# Patient Record
Sex: MALE | Race: WHITE | NOT HISPANIC OR LATINO | Employment: FULL TIME | URBAN - METROPOLITAN AREA
[De-identification: names, ages, dates, MRNs, and addresses within clinical notes are randomized per-mention and may not be internally consistent; named-entity substitution may affect disease eponyms.]

---

## 2017-01-25 ENCOUNTER — OFFICE VISIT (OUTPATIENT)
Dept: INTERNAL MEDICINE | Facility: CLINIC | Age: 62
End: 2017-01-25
Payer: COMMERCIAL

## 2017-01-25 VITALS
TEMPERATURE: 87 F | WEIGHT: 175.06 LBS | OXYGEN SATURATION: 98 % | DIASTOLIC BLOOD PRESSURE: 60 MMHG | HEIGHT: 68 IN | BODY MASS INDEX: 26.53 KG/M2 | HEART RATE: 99 BPM | SYSTOLIC BLOOD PRESSURE: 110 MMHG

## 2017-01-25 DIAGNOSIS — B18.2 CHRONIC HEPATITIS C WITHOUT HEPATIC COMA: ICD-10-CM

## 2017-01-25 DIAGNOSIS — I10 HYPERTENSION, ESSENTIAL: ICD-10-CM

## 2017-01-25 DIAGNOSIS — L57.0 AK (ACTINIC KERATOSIS): ICD-10-CM

## 2017-01-25 DIAGNOSIS — Z00.00 ANNUAL PHYSICAL EXAM: Primary | ICD-10-CM

## 2017-01-25 DIAGNOSIS — Z12.5 PROSTATE CANCER SCREENING: ICD-10-CM

## 2017-01-25 PROCEDURE — 99396 PREV VISIT EST AGE 40-64: CPT | Mod: S$GLB,,, | Performed by: FAMILY MEDICINE

## 2017-01-25 PROCEDURE — 3078F DIAST BP <80 MM HG: CPT | Mod: S$GLB,,, | Performed by: FAMILY MEDICINE

## 2017-01-25 PROCEDURE — 99999 PR PBB SHADOW E&M-EST. PATIENT-LVL III: CPT | Mod: PBBFAC,,, | Performed by: FAMILY MEDICINE

## 2017-01-25 PROCEDURE — 3074F SYST BP LT 130 MM HG: CPT | Mod: S$GLB,,, | Performed by: FAMILY MEDICINE

## 2017-01-25 RX ORDER — LOSARTAN POTASSIUM 100 MG/1
100 TABLET ORAL DAILY
Qty: 180 TABLET | Refills: 1 | Status: SHIPPED | OUTPATIENT
Start: 2017-01-25 | End: 2018-03-14 | Stop reason: SDUPTHER

## 2017-01-25 NOTE — MR AVS SNAPSHOT
[facility]  [address]  [place] [address]  Phone: [phone]  Fax: [phone]                  Jose Sahni   2017 2:00 PM   Office Visit    Description:  Male : 1955   Provider:  Doug Cordero MD   Department:  [facility]           Reason for Visit     Follow-up           Diagnoses this Visit        Comments    Annual physical exam    -  Primary     Hypertension, essential         Chronic hepatitis C without hepatic coma         Prostate cancer screening         AK (actinic keratosis)                To Do List           Future Appointments        Provider Department Dept Phone    2017 10:00 AM LAB, APPOINTMENT [facility] [phone]    2017 11:30 AM Mckenna Simental MD [facility] - Dermatology [phone]      Goals (5 Years of Data)     None      Follow-Up and Disposition     Return in about 1 year (around 2018) for annual physical exam, Keep appointments with specialty providers..    Follow-up and Disposition History       These Medications        Disp Refills Start End    losartan (COZAAR) 100 MG tablet 180 tablet 1 2017     Take 1 tablet (100 mg total) by mouth once daily. - Oral    Pharmacy: [facility] #[id number] - [place], [place] - [address]  #: [phone]         [facility] On Call     [facility] Care Line -  Assistance  Registered nurses in the [facility] Center provide clinical advisement, health education, appointment booking, and other advisory services.  Call for this free service at [phone].             Medications           Message regarding Medications     Verify the changes and/or additions to your medication regime listed below are the same as discussed with your clinician today.  If any of these changes or additions are incorrect, please notify your healthcare provider.             Verify that the below list of medications is an  "accurate representation of the medications you are currently taking.  If none reported, the list may be blank. If incorrect, please contact your healthcare provider. Carry this list with you in case of emergency.           Current Medications     folic acid (FOLVITE) 1 MG tablet Take 1 mg by mouth once daily.    losartan (COZAAR) 100 MG tablet Take 1 tablet (100 mg total) by mouth once daily.    sildenafil (VIAGRA) 100 MG tablet Take 1 tablet (100 mg total) by mouth daily as needed for Erectile Dysfunction.           Clinical Reference Information           Vital Signs - Last Recorded  Most recent update: 1/25/2017  1:56 PM by Trevor Thurman MA    BP Pulse Temp Ht Wt SpO2    110/60 (BP Location: Left arm, Patient Position: Sitting, BP Method: Manual) 99 (!) 87 °F (30.6 °C) (Oral) 5' 8" (1.727 m) 79.4 kg (175 lb 0.7 oz) 98%    BMI                26.62 kg/m2          Blood Pressure          Most Recent Value    BP  110/60      Allergies as of 1/25/2017     Benazepril Hcl      Immunizations Administered on Date of Encounter - 1/25/2017     None      Orders Placed During Today's Visit      Normal Orders This Visit    Ambulatory referral to Dermatology     Future Labs/Procedures Expected by Expires    Lipid panel  1/25/2017 1/25/2018    PSA, Screening  1/25/2017 1/25/2018      [facility] Sign-Up     Activating your [facility] account is as easy as 1-2-3!     1) Visit [email or web address], select Sign Up Now, enter this activation code and your date of birth, then select Next.  [email or web address]  Expires: 3/11/2017  2:52 PM      2) Create a username and password to use when you visit [facility] in the future and select a security question in case you lose your password and select Next.    3) Enter your e-mail address and click Sign Up!    Additional Information  If you have questions, please e-mail [email or web address] or call [phone] to talk to our [facility] staff. Remember, [facility] is NOT to be used for urgent needs. " For medical emergencies, dial 911.

## 2017-01-25 NOTE — PROGRESS NOTES
Subjective:       Patient ID: Jose Sahni is a 61 y.o. male.    Chief Complaint: Follow-up    HPI Comments: Established patient for an annual wellness check/physical exam. No specific complaints, please see ROS.      Review of Systems   Constitutional: Negative for chills, fatigue and fever.   HENT: Negative for congestion and trouble swallowing.    Eyes: Negative for redness.   Respiratory: Negative for cough, chest tightness and shortness of breath.    Cardiovascular: Negative for chest pain, palpitations and leg swelling.   Gastrointestinal: Negative for abdominal pain and blood in stool.        Anal itching   Genitourinary: Negative for hematuria.   Musculoskeletal: Negative for arthralgias, back pain, gait problem, joint swelling, myalgias and neck pain.   Skin: Negative for color change and rash.   Neurological: Negative for tremors, speech difficulty, weakness, numbness and headaches.   Hematological: Negative for adenopathy. Does not bruise/bleed easily.   Psychiatric/Behavioral: Negative for behavioral problems, confusion and sleep disturbance. The patient is not nervous/anxious.        Objective:      Physical Exam   Constitutional: He appears well-developed and well-nourished.   HENT:   Head: Normocephalic.   Right Ear: Tympanic membrane, external ear and ear canal normal.   Left Ear: Tympanic membrane, external ear and ear canal normal.   Mouth/Throat: Oropharynx is clear and moist.   Eyes: Pupils are equal, round, and reactive to light.   Neck: Normal range of motion. Neck supple. Carotid bruit is not present. No thyromegaly present.   Cardiovascular: Normal rate, regular rhythm, normal heart sounds and intact distal pulses.  Exam reveals no gallop and no friction rub.    No murmur heard.  Pulmonary/Chest: Effort normal and breath sounds normal.   Abdominal: Soft. Bowel sounds are normal. He exhibits no distension and no mass. There is no tenderness. There is no rebound and no guarding.    Musculoskeletal: Normal range of motion. He exhibits no edema or tenderness.   Lymphadenopathy:     He has no cervical adenopathy.   Neurological: He is alert. He has normal strength. He displays normal reflexes. No cranial nerve deficit or sensory deficit. Coordination and gait normal.   Skin: Skin is warm and dry.   Psychiatric: He has a normal mood and affect. His behavior is normal. Judgment and thought content normal.   Nursing note and vitals reviewed.      Assessment:       1. Annual physical exam    2. Hypertension, essential    3. Chronic hepatitis C without hepatic coma    4. Prostate cancer screening    5. AK (actinic keratosis)        Plan:   Jose was seen today for follow-up.    Diagnoses and all orders for this visit:    Annual physical exam  -     Lipid panel; Future    Hypertension, essential    Chronic hepatitis C without hepatic coma    Prostate cancer screening  -     PSA, Screening; Future    AK (actinic keratosis)  -     Ambulatory referral to Dermatology    Other orders  -     losartan (COZAAR) 100 MG tablet; Take 1 tablet (100 mg total) by mouth once daily.      See meds, orders, follow up, routing and instructions sections of encounter.  Annual physical examination.  His last colonoscopy was reviewed, next due  2021.    He is out of his blood pressure medications.  He states compliance.  He has a   history of hepatitis C, which is pending full treatment.  He is followed in our   Hepatology Service.  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    AKs noted, Dermatology consult.  Follow up with Hepatology Clinic.      TAYLOR/HN  dd: 01/25/2017 15:46:12 (CST)  td: 01/26/2017 11:30:38 (CST)  Doc ID   #[id number]  Job ID #[id number]    CC:

## 2017-01-26 ENCOUNTER — LAB VISIT (OUTPATIENT)
Dept: LAB | Facility: HOSPITAL | Age: 62
End: 2017-01-26
Attending: FAMILY MEDICINE
Payer: COMMERCIAL

## 2017-01-26 DIAGNOSIS — Z12.5 PROSTATE CANCER SCREENING: ICD-10-CM

## 2017-01-26 DIAGNOSIS — Z00.00 ANNUAL PHYSICAL EXAM: ICD-10-CM

## 2017-01-26 LAB
CHOLEST/HDLC SERPL: 4 {RATIO}
COMPLEXED PSA SERPL-MCNC: 0.98 NG/ML
HDL/CHOLESTEROL RATIO: 24.8 %
HDLC SERPL-MCNC: 121 MG/DL
HDLC SERPL-MCNC: 30 MG/DL
LDLC SERPL CALC-MCNC: 68.8 MG/DL
NONHDLC SERPL-MCNC: 91 MG/DL
TRIGL SERPL-MCNC: 111 MG/DL

## 2017-01-26 PROCEDURE — 36415 COLL VENOUS BLD VENIPUNCTURE: CPT

## 2017-01-26 PROCEDURE — 84153 ASSAY OF PSA TOTAL: CPT

## 2017-01-26 PROCEDURE — 80061 LIPID PANEL: CPT

## 2017-01-31 ENCOUNTER — INITIAL CONSULT (OUTPATIENT)
Dept: DERMATOLOGY | Facility: CLINIC | Age: 62
End: 2017-01-31
Payer: COMMERCIAL

## 2017-01-31 DIAGNOSIS — D48.5 NEOPLASM OF UNCERTAIN BEHAVIOR OF SKIN: ICD-10-CM

## 2017-01-31 DIAGNOSIS — L57.0 ACTINIC KERATOSIS: Primary | ICD-10-CM

## 2017-01-31 DIAGNOSIS — D22.9 MULTIPLE BENIGN NEVI: ICD-10-CM

## 2017-01-31 DIAGNOSIS — L81.4 LENTIGINES: ICD-10-CM

## 2017-01-31 PROCEDURE — 99202 OFFICE O/P NEW SF 15 MIN: CPT | Mod: S$GLB,,, | Performed by: DERMATOLOGY

## 2017-01-31 PROCEDURE — 1159F MED LIST DOCD IN RCRD: CPT | Mod: S$GLB,,, | Performed by: DERMATOLOGY

## 2017-01-31 PROCEDURE — 99999 PR PBB SHADOW E&M-EST. PATIENT-LVL II: CPT | Mod: PBBFAC,,, | Performed by: DERMATOLOGY

## 2017-01-31 NOTE — LETTER
January 31, 2017      Doug Cordero MD  [address]  [place] [address]           [facility] - Dermatology  [address]  [place] [address]  Phone: [phone]  Fax: [phone]          Patient: Jose Sahni   MR Number: [id number]   YOB: 1955   Date of Visit: 1/31/2017       Dear Dr. Doug Cordero:    Thank you for referring Jose Sahni to me for evaluation. Attached you will find relevant portions of my assessment and plan of care.    If you have questions, please do not hesitate to call me. I look forward to following Jose Sahni along with you.    Sincerely,    Mckenna Simental MD    Enclosure  CC:  No Recipients    If you would like to receive this communication electronically, please contact [email or web address] or [phone] to request more information on [employer] Link access.    For providers and/or their staff who would like to refer a patient to [facility], please contact us through our one-stop-shop provider referral line, [facility] [place], at [phone].    If you feel you have received this communication in error or would no longer like to receive these types of communications, please e-mail [email or web address]

## 2017-01-31 NOTE — PROGRESS NOTES
Subjective:       Patient ID:  Jose Sahni is a 61 y.o. male who presents for   Chief Complaint   Patient presents with    Spots and/or Floaters     forearms, x yrs, asym, no tx     HPI  62 yo M presents for evaluation of rough spots on his arms for several years.  Denies bleeding.  No prior treatments.  [place] at [place] time); usually wears long sleeves and a hat.  There is also a spot on nose x 7 months, stable in size; denies bleeding; noticed after falling asleep with glasses on    Derm Hx: Denies personal or family h/o skin cancer    Past Medical History   Diagnosis Date    Allergy     Asymptomatic cholelithiasis     Heart murmur     Hepatitis      gentoype 2a or 2c    Hypertension     Tuberculosis      Hx postive     Review of Systems   Constitutional: Negative for fever, chills, fatigue and malaise.   Skin: Negative for daily sunscreen use, activity-related sunscreen use and recent sunburn.   Hematologic/Lymphatic: Bruises/bleeds easily.        Objective:    Physical Exam   Constitutional: He appears well-developed and well-nourished. No distress.   Neurological: He is alert and oriented to person, place, and time. He is not disoriented.   Psychiatric: He has a normal mood and affect.   Skin:   Areas Examined (abnormalities noted in diagram):   Scalp / Hair Palpated and Inspected  Head / Face Inspection Performed  Neck Inspection Performed  Back Inspection Performed  RUE Inspected  LUE Inspection Performed                   Diagram Legend     Erythematous scaling macule/papule c/w actinic keratosis       Vascular papule c/w angioma      Pigmented verrucoid papule/plaque c/w seborrheic keratosis      Yellow umbilicated papule c/w sebaceous hyperplasia      Irregularly shaped tan macule c/w lentigo     1-2 mm smooth white papules consistent with Milia      Movable subcutaneous cyst with punctum c/w epidermal inclusion cyst      Subcutaneous  movable cyst c/w pilar cyst      Firm pink to brown papule c/w dermatofibroma      Pedunculated fleshy papule(s) c/w skin tag(s)      Evenly pigmented macule c/w junctional nevus     Mildly variegated pigmented, slightly irregular-bordered macule c/w mildly atypical nevus      Flesh colored to evenly pigmented papule c/w intradermal nevus       Pink pearly papule/plaque c/w basal cell carcinoma      Erythematous hyperkeratotic cursted plaque c/w SCC      Surgical scar with no sign of skin cancer recurrence      Open and closed comedones      Inflammatory papules and pustules      Verrucoid papule consistent consistent with wart     Erythematous eczematous patches and plaques     Dystrophic onycholytic nail with subungual debris c/w onychomycosis     Umbilicated papule    Erythematous-base heme-crusted tan verrucoid plaque consistent with inflamed seborrheic keratosis     Erythematous Silvery Scaling Plaque c/w Psoriasis     See annotation      Assessment / Plan:        Actinic keratosis  Pt leaving x 4 months.  Wife will call to schedule follow up after they discuss (Efudex vs PDT)    Lentigines  This is a benign hyperpigmented sun induced lesion. Daily sun protection will reduce the number of new lesions    Multiple benign Nevi  Reassurance that his nevi appear benign with regular and consistent pigment pattern on dermoscopy    Neoplasm of Uncertain Behavior  Likely angioma but will continue to monitor. Measured today         Return in about 4 months (around 5/31/2017).

## 2017-07-20 ENCOUNTER — CLINICAL SUPPORT (OUTPATIENT)
Dept: URGENT CARE | Facility: CLINIC | Age: 62
End: 2017-07-20

## 2017-07-20 VITALS
RESPIRATION RATE: 14 BRPM | DIASTOLIC BLOOD PRESSURE: 68 MMHG | SYSTOLIC BLOOD PRESSURE: 136 MMHG | HEART RATE: 82 BPM | OXYGEN SATURATION: 98 % | HEIGHT: 67 IN | BODY MASS INDEX: 27 KG/M2 | TEMPERATURE: 98 F | WEIGHT: 172 LBS

## 2017-07-20 DIAGNOSIS — Z00.00 PHYSICAL EXAM: Primary | ICD-10-CM

## 2017-07-20 PROCEDURE — 99499 UNLISTED E&M SERVICE: CPT | Mod: S$GLB,,, | Performed by: PREVENTIVE MEDICINE

## 2017-07-20 PROCEDURE — 99000 SPECIMEN HANDLING OFFICE-LAB: CPT | Mod: S$GLB,,, | Performed by: PREVENTIVE MEDICINE

## 2017-07-20 PROCEDURE — 36415 COLL VENOUS BLD VENIPUNCTURE: CPT | Mod: S$GLB,,, | Performed by: PREVENTIVE MEDICINE

## 2017-07-20 PROCEDURE — 93000 ELECTROCARDIOGRAM COMPLETE: CPT | Mod: S$GLB,,, | Performed by: PREVENTIVE MEDICINE

## 2017-08-10 ENCOUNTER — TELEPHONE (OUTPATIENT)
Dept: HEPATOLOGY | Facility: CLINIC | Age: 62
End: 2017-08-10

## 2017-08-10 DIAGNOSIS — B18.2 CHRONIC HEPATITIS C WITHOUT HEPATIC COMA: Primary | ICD-10-CM

## 2017-08-10 NOTE — TELEPHONE ENCOUNTER
----- Message from Angel Larios sent at 8/10/2017 11:01 AM CDT -----  Contact: Spouse  Would like to scheduled an appt,     Call [phone]

## 2017-08-25 ENCOUNTER — EPISODE CHANGES (OUTPATIENT)
Dept: HEPATOLOGY | Facility: CLINIC | Age: 62
End: 2017-08-25

## 2017-08-25 ENCOUNTER — OFFICE VISIT (OUTPATIENT)
Dept: HEPATOLOGY | Facility: CLINIC | Age: 62
End: 2017-08-25
Payer: COMMERCIAL

## 2017-08-25 ENCOUNTER — LAB VISIT (OUTPATIENT)
Dept: LAB | Facility: HOSPITAL | Age: 62
End: 2017-08-25
Attending: INTERNAL MEDICINE
Payer: COMMERCIAL

## 2017-08-25 ENCOUNTER — PROCEDURE VISIT (OUTPATIENT)
Dept: HEPATOLOGY | Facility: CLINIC | Age: 62
End: 2017-08-25
Attending: INTERNAL MEDICINE
Payer: COMMERCIAL

## 2017-08-25 VITALS
OXYGEN SATURATION: 100 % | DIASTOLIC BLOOD PRESSURE: 68 MMHG | BODY MASS INDEX: 25.76 KG/M2 | HEIGHT: 68 IN | TEMPERATURE: 97 F | WEIGHT: 170 LBS | HEART RATE: 66 BPM | RESPIRATION RATE: 18 BRPM | SYSTOLIC BLOOD PRESSURE: 166 MMHG

## 2017-08-25 DIAGNOSIS — B18.2 CHRONIC HEPATITIS C WITHOUT HEPATIC COMA: ICD-10-CM

## 2017-08-25 DIAGNOSIS — Z71.89 ENCOUNTER FOR MEDICATION COUNSELING: ICD-10-CM

## 2017-08-25 DIAGNOSIS — B18.2 CHRONIC HEPATITIS C WITHOUT HEPATIC COMA: Primary | ICD-10-CM

## 2017-08-25 LAB
ALBUMIN SERPL BCP-MCNC: 4.1 G/DL
ALP SERPL-CCNC: 64 U/L
ALT SERPL W/O P-5'-P-CCNC: 30 U/L
ANION GAP SERPL CALC-SCNC: 5 MMOL/L
AST SERPL-CCNC: 23 U/L
BASOPHILS # BLD AUTO: 0.02 K/UL
BASOPHILS NFR BLD: 0.3 %
BILIRUB SERPL-MCNC: 1.7 MG/DL
BUN SERPL-MCNC: 18 MG/DL
CALCIUM SERPL-MCNC: 9.2 MG/DL
CHLORIDE SERPL-SCNC: 109 MMOL/L
CO2 SERPL-SCNC: 25 MMOL/L
CREAT SERPL-MCNC: 1 MG/DL
DIFFERENTIAL METHOD: ABNORMAL
EOSINOPHIL # BLD AUTO: 0.1 K/UL
EOSINOPHIL NFR BLD: 1.5 %
ERYTHROCYTE [DISTWIDTH] IN BLOOD BY AUTOMATED COUNT: 14.7 %
EST. GFR  (AFRICAN AMERICAN): >60 ML/MIN/1.73 M^2
EST. GFR  (NON AFRICAN AMERICAN): >60 ML/MIN/1.73 M^2
GLUCOSE SERPL-MCNC: 108 MG/DL
HCT VFR BLD AUTO: 34.1 %
HGB BLD-MCNC: 12 G/DL
INR PPP: 1.1
LYMPHOCYTES # BLD AUTO: 1.3 K/UL
LYMPHOCYTES NFR BLD: 21.6 %
MCH RBC QN AUTO: 30.9 PG
MCHC RBC AUTO-ENTMCNC: 35.2 G/DL
MCV RBC AUTO: 88 FL
MONOCYTES # BLD AUTO: 0.5 K/UL
MONOCYTES NFR BLD: 7.9 %
NEUTROPHILS # BLD AUTO: 4 K/UL
NEUTROPHILS NFR BLD: 68 %
PLATELET # BLD AUTO: 211 K/UL
PMV BLD AUTO: 9 FL
POTASSIUM SERPL-SCNC: 4.2 MMOL/L
PROT SERPL-MCNC: 8.1 G/DL
PROTHROMBIN TIME: 11.5 SEC
RBC # BLD AUTO: 3.88 M/UL
SODIUM SERPL-SCNC: 139 MMOL/L
WBC # BLD AUTO: 5.84 K/UL

## 2017-08-25 PROCEDURE — 85610 PROTHROMBIN TIME: CPT

## 2017-08-25 PROCEDURE — 3077F SYST BP >= 140 MM HG: CPT | Mod: S$GLB,,, | Performed by: PHYSICIAN ASSISTANT

## 2017-08-25 PROCEDURE — 36415 COLL VENOUS BLD VENIPUNCTURE: CPT

## 2017-08-25 PROCEDURE — 99999 PR PBB SHADOW E&M-EST. PATIENT-LVL IV: CPT | Mod: PBBFAC,,, | Performed by: PHYSICIAN ASSISTANT

## 2017-08-25 PROCEDURE — 87522 HEPATITIS C REVRS TRNSCRPJ: CPT

## 2017-08-25 PROCEDURE — 3078F DIAST BP <80 MM HG: CPT | Mod: S$GLB,,, | Performed by: PHYSICIAN ASSISTANT

## 2017-08-25 PROCEDURE — 99214 OFFICE O/P EST MOD 30 MIN: CPT | Mod: S$GLB,,, | Performed by: PHYSICIAN ASSISTANT

## 2017-08-25 PROCEDURE — 91200 LIVER ELASTOGRAPHY: CPT | Mod: S$GLB,,, | Performed by: PHYSICIAN ASSISTANT

## 2017-08-25 PROCEDURE — 85025 COMPLETE CBC W/AUTO DIFF WBC: CPT

## 2017-08-25 PROCEDURE — 80053 COMPREHEN METABOLIC PANEL: CPT

## 2017-08-25 PROCEDURE — 3008F BODY MASS INDEX DOCD: CPT | Mod: S$GLB,,, | Performed by: PHYSICIAN ASSISTANT

## 2017-08-25 RX ORDER — VELPATASVIR AND SOFOSBUVIR 100; 400 MG/1; MG/1
1 TABLET, FILM COATED ORAL DAILY
Qty: 28 TABLET | Refills: 2 | Status: SHIPPED | OUTPATIENT
Start: 2017-08-25 | End: 2017-09-07 | Stop reason: SDUPTHER

## 2017-08-25 NOTE — PROCEDURES
Procedures Fibroscan Procedure     Name: Jose Sahni  Date of Procedure : 2017   :: Katarina Shaikh PA-C  Diagnosis: HCV    Probe: M    Fibroscan readin.3 KPa    Fibrosis:F2     CAP readin dB/m    Steatosis: :S1

## 2017-08-25 NOTE — PROGRESS NOTES
HEPATOLOGY CLINIC VISIT NOTE     REFERRING PROVIDER:   Doug Cordero MD    CHIEF COMPLAINT: Hepatitis C     HISTORY: Patient is a 62 y.o. Male with treatment naive genotype 2a or 2c Chronic Hepatitis C and intravascular hemolytic anemia.  Previously followed  by Enio Martin MD in the hepatology clinic, was referred here for HCV treatment.  He underwent liver biopsy for staging purposes, as findings of splenomegaly, thrombocytopenia and hyperbilirubinemia raised concern for cirrhosis, however thought more likely to be secondary to his  hemolytic anemia.   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  he had an elevated ferritin in the past, but negative hemochromatosis gene analysis. Labs from today show normal platelets > 200K, INR = 1.1, T bili 1.7, transaminases essentially normal, albumin 4.1 and creatinine 1.0. No recent imaging to review     due to his hemolytic anemia hcv therapy has not yet been pursued, as past regimens consist of ribavirin.  still follows w/ Dr. Cordero, no complaints, feels well. 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     Previously noted, He was referred to Hematology to evaluate findings on MRI w/ diffuse abnormal marrow signal with some focal areas of signal abnormality within the proximal humerus and it is thought that the hyperbilirubinemia, splenomegaly and gallstones could be a result of chronic intravascular hemolysis.  His f/u labs were consistent w/ Hereditary Spherocytosis.  ( low  haptoglobin, direct cecil neg and elevated retic count) and it was recommended he begin Folic acid supplements.      He denies symptoms of advanced fibrosis such as darkening of the urine, yellowing of the skin or eyes, vomiting of blood, black stool or confusion.     Hepatitis C risk factors:   --h/o blood transfusion: ?   --surgery: open heart--1960's (age 7)   --IVDrug use: no   --nasal drug use: no   --tattoos: 1978   --contact with anyone known to have HCV: no   --hi risk occupation: no     Past Medical History:   Diagnosis Date    Allergy     Asymptomatic cholelithiasis     Heart murmur     Hepatitis     gentoype 2a or 2c    Hypertension     Tuberculosis     Hx postive     Past Surgical History:   Procedure Laterality Date    CARDIAC SURGERY  1962    HAND SURGERY  2008    Left hand    HERNIA REPAIR  1967    orchiopexy, left  age 12     Allergies and medications: reviewed in epic.     ROS: see HPI    PE:  WDWN, NAD  Alert, oriented and pleasant.   Vitals:    08/25/17 1021   BP: (!) 166/68   Pulse: 66   Resp: 18   Temp: 96.7 °F (35.9 °C)   HEENT: no scleral icterus  CV: r/r/r  Lungs: chest symmetric with respirations. CTA w/ EBBS   Neuro/psych: no asterixis, oriented x3, mood and affect appropriate.   Skin: warm and dry, no c/c/e. No sanabria erythema.      LABS:     Lab Results   Component Value Date    WBC 5.84 08/25/2017    HGB 12.0 (L) 08/25/2017     08/25/2017     Lab Results   Component Value Date    AST 23 08/25/2017    ALT 30 08/25/2017    CREATININE 1.0 08/25/2017    BILITOT 1.7 (H) 08/25/2017    ALBUMIN 4.1 08/25/2017    INR 1.1 10/18/2016     FINAL PATHOLOGIC DIAGNOSIS   LIVER; NEEDLE BIOPSY:   CHRONIC HEPATITIS C WITH MINIMAL ACTIVITY (GRADE 1 OUT OF 4)   PORTAL FIBROSIS (STAGE 1 OUT OF 4)   NO STEATOSIS.   INCREASED HEPATOCYTE IRON, 2+ OUT OF 4.   IRON AND TRICHROME WITH APPROPRIATE CONTROLS.   Diagnosed by: Julia Tomsa M.D.   (Electronically Signed: 2014-04-23  15:06:40)    ASSESSMENT: 63yo white male with:   1. CHRONIC HEPATITIS C GENOTYPE 2a or 2c, tx naive   4/2014 liver biopsy revealed grade 1 stage 1 fibrosis.   Fibroscan = F2 fibrosis   low viral load.   Mildly elevated transaminases.   vaccinated for hepatitis A and B in the past     2. Mild steatosis   CAP = 244= S1    3. 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 Sahni  on Folic acid supplements   splenomegaly on imaging and hyperbilirubinemia., is likely secondary to intravascular hemolytic anemia as stated in hematology note     4. H/o Positive PPD, per patient   followed with cxr   was initiated on treatment, but was discontinued secondary to elevated transaminases (per patients report)     5. Hypertension.   stable on medications     DISCUSSION    HCV TREATMENT    This pt is interested in pursuing Hep C treatment and  I think is a great candidate, given the progression of his liver disease, I would highly recommend treatment at this time    Today we reviewed the following basic information about the regimen  EPCLUSA (combination pill of 2 DAA's) for genotype 1-6 Hepatitis C     Dosing:  once daily, with or without food, same time each day, compliance w/ dosing highly stressed!  Duration of tx: 12 weeks.   Discussed SVR rates and relapse rates    Most common side effects in clinical trials: fatigue, headache, nausea, diarrhea and insomina.   Discussed must discuss w/ us before using any medications for heartburn--OTC or prescribed.--does not use.  Herbal / alternative therapies must be avoided  We reviewed the Patient information in the EPCLUSA PI and  was given a copy of this as well.     We discussed that medications will be sent to [facility]acy here at [facility] and they will request insurance authorization.    Discussed this process could take 2-3 weeks.   Pharmacist will notify when they here back from insurance.   If request for treatment is approved:   --Pt will contact me  before starting so that labs and f/u can be arranged and appropriate times.   --Labs 6 & 12 weeks after starting treatment.    Pt had ability to ask questions, all were answered to  satisfaction.     PLAN:   1. Await pending hcvrna quant   2. Request insurance authorization for HCV treatment w/ EPCLUSA 12 week regimen  -Pt will be out of town until the sept 7/8  --pt is given handout on EPCLUSA   --request pharmacy to review for DDI  &  Notify us of insurance decision   --if treatment approved, pt will contact me back w/ treatment start date so appropriate labs can be ordered.   --labs will be arranged at week 6 & 12 of treatment (standing orders entered) pt will receive call after labs are reviewed.   3. Abdominal ultrasound   4. Clinic in 2-3 months

## 2017-08-28 ENCOUNTER — TELEPHONE (OUTPATIENT)
Dept: PHARMACY | Facility: CLINIC | Age: 62
End: 2017-08-28

## 2017-08-28 LAB
HCV LOG: 3.5 LOG (10) IU/ML
HCV RNA QUANT PCR: ABNORMAL IU/ML
HCV, QUALITATIVE: DETECTED IU/ML

## 2017-08-30 NOTE — TELEPHONE ENCOUNTER
Faxed prior authorization for Epclusa to insurance company for review on Wed 08/30/2017 @3:03pm JULIO

## 2017-09-05 NOTE — TELEPHONE ENCOUNTER
Documentation Only  Epclusa Co Pay Card  $5.00/co pay  Effective 9-5-17 through 3-4-18  ID# [id number]  Rx BIN [id number]  RX PCN Loyalty  RX Group [id number]

## 2017-09-05 NOTE — TELEPHONE ENCOUNTER
DOCUMENTATION ONLY  Epclusa prior authorization approved on 09/03/2017 x 12 weeks.  Approval date: 09/02/2017 to 11/25/2017  PA# [id number]  Co-pay: 10%    **Forward to patient assistance for co-pay coupon card**    MUST USE [facility]. [address] Telephone: [phone]

## 2017-09-07 ENCOUNTER — TELEPHONE (OUTPATIENT)
Dept: HEPATOLOGY | Facility: CLINIC | Age: 62
End: 2017-09-07

## 2017-09-07 DIAGNOSIS — B18.2 CHRONIC HEPATITIS C WITHOUT HEPATIC COMA: ICD-10-CM

## 2017-09-07 RX ORDER — VELPATASVIR AND SOFOSBUVIR 100; 400 MG/1; MG/1
1 TABLET, FILM COATED ORAL DAILY
Qty: 28 TABLET | Refills: 2 | Status: SHIPPED | OUTPATIENT
Start: 2017-09-07 | End: 2018-01-26 | Stop reason: ALTCHOICE

## 2017-09-07 NOTE — TELEPHONE ENCOUNTER
Rx printed.   pls send as appropriate     Per pharmacy note: . Please send prescription to [facility] which has been added to the patients preferred pharmacies.

## 2017-09-08 ENCOUNTER — TELEPHONE (OUTPATIENT)
Dept: HEPATOLOGY | Facility: CLINIC | Age: 62
End: 2017-09-08

## 2017-09-08 NOTE — TELEPHONE ENCOUNTER
----- Message from Hanny Sarkar RN sent at 9/8/2017  3:53 PM CDT -----  Contact: Tiffanie with [facility]  RINKU    ----- Message -----  From: Antonia Mccormick  Sent: 9/8/2017   1:40 PM  To: [facility] Hepat Clinical Staff    [employer] is requesting to know the pts Monique type & viral load     Contact number [phone]  Thanks

## 2017-09-08 NOTE — TELEPHONE ENCOUNTER
Initial Epclusa consult completed on 9/8/2017. Patient will call provider once Epclusa received from [employer].     Epclusa 400/100mg- Take one tablet by mouth daily x 12 weeks  Counseling was reviewed:   1. Patient MUST take Epclusa at the SAME time every day.   2. Patient MUST avoid acid reducers without consulting with myself or provider first. Antacids are to be spaced out at least 4 hours apart from Epclusa.   3. Potential Side effects include: nausea, headaches, insomnia and fatigue.   Headache: Patient may treat with OTC remedies. If Tylenol is used, dose should not exceed 2000mg per day.    4. Medication list reviewed. No DDIs or allergies noted. Patient MUST contact myself or provider prior to starting any new OTC, herbal, or prescription drugs to avoid potential DDIs.    DDI: Medication list reviewed and potential DDIs addressed.    Discussed the importance of staying well hydrated while on therapy. Compliance stressed - patient to take missed doses as soon as remembered, but NOT to take 2 doses in one day. Patient will report questions or concerns practitioner. Patient verbalizes understanding.  Patient will call provider once Epclusa received from [employer].  Consultation included: indication; goals of treatment; administration; storage and handling; side effects; how to handle side effects; the importance of compliance; how to handle missed doses; the importance of laboratory monitoring; the importance of keeping all follow up appointments.  Patient understands to report any medication changes to provider. All questions answered and addressed to patients satisfaction.

## 2017-09-12 ENCOUNTER — TELEPHONE (OUTPATIENT)
Dept: HEPATOLOGY | Facility: CLINIC | Age: 62
End: 2017-09-12

## 2017-09-12 ENCOUNTER — HOSPITAL ENCOUNTER (OUTPATIENT)
Dept: RADIOLOGY | Facility: HOSPITAL | Age: 62
Discharge: HOME | End: 2017-09-12
Attending: PHYSICIAN ASSISTANT
Payer: COMMERCIAL

## 2017-09-12 DIAGNOSIS — B18.2 CHRONIC HEPATITIS C WITHOUT HEPATIC COMA: ICD-10-CM

## 2017-09-12 PROCEDURE — 76700 US EXAM ABDOM COMPLETE: CPT | Mod: TC

## 2017-09-12 PROCEDURE — 76700 US EXAM ABDOM COMPLETE: CPT | Mod: 26,,, | Performed by: RADIOLOGY

## 2017-09-12 NOTE — TELEPHONE ENCOUNTER
----- Message from Katarina Shaikh PA-C sent at 9/12/2017  1:19 PM CDT -----  Please let pt know, ultrasound looks good   Give us a call when he receives his medication.

## 2017-09-18 ENCOUNTER — EPISODE CHANGES (OUTPATIENT)
Dept: HEPATOLOGY | Facility: CLINIC | Age: 62
End: 2017-09-18

## 2017-09-20 ENCOUNTER — TELEPHONE (OUTPATIENT)
Dept: HEPATOLOGY | Facility: CLINIC | Age: 62
End: 2017-09-20

## 2017-09-20 ENCOUNTER — EPISODE CHANGES (OUTPATIENT)
Dept: HEPATOLOGY | Facility: CLINIC | Age: 62
End: 2017-09-20

## 2017-09-20 NOTE — TELEPHONE ENCOUNTER
Patient phoned stating that he started hep c tx on 9/20.  He will have labs drawn at [facility] while on therapy and communicate with provider via [facility].  Please provide lab orders.

## 2017-09-20 NOTE — TELEPHONE ENCOUNTER
----- Message from Dominga Mcrae sent at 9/20/2017  2:02 PM CDT -----  Contact: patient   Imelda,    Calling to let you know he started his medication today.       Please call      Thanks!!!!

## 2017-09-21 ENCOUNTER — TELEPHONE (OUTPATIENT)
Dept: HEPATOLOGY | Facility: CLINIC | Age: 62
End: 2017-09-21

## 2017-09-21 NOTE — TELEPHONE ENCOUNTER
Pt starting EPCLUSA 12 week regimen on  9-20 -2017   Pt has GT 2 HCV, tx naive     Please arrange the following labs @ the [facility] facility nearest them   Oct 31, Wk 6--cmp & hcvrna quant  dec12 Wk 12. --cmp & hcvrna quant    * reminders:   Take EPCLUSA once daily,  same time each day,   no skipping or missing doses  No herbals during treatment  Nothing for heartburn --OTC or Rx w/o talking w/ us first.

## 2017-10-27 ENCOUNTER — EPISODE CHANGES (OUTPATIENT)
Dept: HEPATOLOGY | Facility: CLINIC | Age: 62
End: 2017-10-27

## 2017-10-27 ENCOUNTER — LAB VISIT (OUTPATIENT)
Dept: LAB | Facility: HOSPITAL | Age: 62
End: 2017-10-27
Payer: COMMERCIAL

## 2017-10-27 ENCOUNTER — OFFICE VISIT (OUTPATIENT)
Dept: HEPATOLOGY | Facility: CLINIC | Age: 62
End: 2017-10-27
Payer: COMMERCIAL

## 2017-10-27 VITALS
DIASTOLIC BLOOD PRESSURE: 72 MMHG | BODY MASS INDEX: 25.86 KG/M2 | SYSTOLIC BLOOD PRESSURE: 152 MMHG | HEART RATE: 76 BPM | TEMPERATURE: 96 F | WEIGHT: 170.63 LBS | HEIGHT: 68 IN | RESPIRATION RATE: 18 BRPM | OXYGEN SATURATION: 100 %

## 2017-10-27 DIAGNOSIS — K76.0 HEPATIC STEATOSIS: ICD-10-CM

## 2017-10-27 DIAGNOSIS — B18.2 CHRONIC HEPATITIS C WITHOUT HEPATIC COMA: Primary | ICD-10-CM

## 2017-10-27 DIAGNOSIS — B18.2 CHRONIC HEPATITIS C WITHOUT HEPATIC COMA: ICD-10-CM

## 2017-10-27 LAB
ALBUMIN SERPL BCP-MCNC: 4.4 G/DL
ALP SERPL-CCNC: 62 U/L
ALT SERPL W/O P-5'-P-CCNC: 15 U/L
ANION GAP SERPL CALC-SCNC: 7 MMOL/L
AST SERPL-CCNC: 16 U/L
BILIRUB SERPL-MCNC: 2.6 MG/DL
BUN SERPL-MCNC: 21 MG/DL
CALCIUM SERPL-MCNC: 9.8 MG/DL
CHLORIDE SERPL-SCNC: 106 MMOL/L
CO2 SERPL-SCNC: 23 MMOL/L
CREAT SERPL-MCNC: 1 MG/DL
EST. GFR  (AFRICAN AMERICAN): >60 ML/MIN/1.73 M^2
EST. GFR  (NON AFRICAN AMERICAN): >60 ML/MIN/1.73 M^2
GLUCOSE SERPL-MCNC: 98 MG/DL
POTASSIUM SERPL-SCNC: 4.7 MMOL/L
PROT SERPL-MCNC: 8.5 G/DL
SODIUM SERPL-SCNC: 136 MMOL/L

## 2017-10-27 PROCEDURE — 80053 COMPREHEN METABOLIC PANEL: CPT

## 2017-10-27 PROCEDURE — 36415 COLL VENOUS BLD VENIPUNCTURE: CPT

## 2017-10-27 PROCEDURE — 87522 HEPATITIS C REVRS TRNSCRPJ: CPT

## 2017-10-27 PROCEDURE — 99214 OFFICE O/P EST MOD 30 MIN: CPT | Mod: S$GLB,,, | Performed by: PHYSICIAN ASSISTANT

## 2017-10-27 PROCEDURE — 99999 PR PBB SHADOW E&M-EST. PATIENT-LVL IV: CPT | Mod: PBBFAC,,, | Performed by: PHYSICIAN ASSISTANT

## 2017-10-27 NOTE — PROGRESS NOTES
HEPATOLOGY CLINIC VISIT NOTE     REFERRING PROVIDER:   Doug Cordero MD    CHIEF COMPLAINT: Hepatitis C     HISTORY: Patient is a 62 y.o. Male with on Epclusa for  genotype 2a or 2c Chronic Hepatitis C.  Medical history also significant for intravascular hemolytic anemia, as to why we were awaiting a regimen that did not include ribavirin.  Previously followed  by Enio Martin MD in the hepatology clinic, was referred here for HCV treatment.  He underwent liver biopsy for staging purposes, as findings of splenomegaly, thrombocytopenia and hyperbilirubinemia raised concern for cirrhosis, however thought more likely to be secondary to his  hemolytic anemia.   His 4/2014 liver biopsy revealed grade 1 stage 1 fibrosis restaging w/ Fibroscan before initiation of treatment showed some progression w/ score of 7.3kPa = F2/stage 2 fibrosis w/ CAP measurement of 244dB/m = S1     He was diagnosed with HCV after noticing elevated transaminases during a physical with the ; He is not clear as to how he acquired HCV, it is quite possible that he had a blood transfusion in 1962 when he underwent open heart surgery to correct congenital anomalies, however he was 7yrs of age at that time.  he had an elevated ferritin in the past, but negative hemochromatosis gene analysis without mutations present.    Treatment w/ Epclusa started on 9/20/17 (first attempt)   Upcoming labs on 10-31, jossy wk 6, will arrange for today since he is here .   Feels fine, reports no side effects from treatment.   Denies skipped or missed doses.   Does have some fatigue,   Reports sleeping fine, no rash, no headache, denies dark urine, fluid accumulation in belly or legs.      still follows w/ Dr. Cordero, no complaints, feels well. Still works offshore great deal    Previously noted, He was referred to Hematology to evaluate findings on MRI w/ diffuse abnormal marrow signal with some focal areas of signal abnormality within the proximal humerus  and it is thought that the hyperbilirubinemia, splenomegaly and gallstones could be a result of chronic intravascular hemolysis.  His f/u labs were consistent w/ Hereditary Spherocytosis.  ( low haptoglobin, direct cecil neg and elevated retic count) and it was recommended he begin Folic acid supplements.      Hepatitis C risk factors:   --h/o blood transfusion: ?   --surgery: open heart--1960's (age 7)   --IVDrug use: no   --nasal drug use: no   --tattoos: 1978   --contact with anyone known to have HCV: no   --hi risk occupation: no     Past Medical History:   Diagnosis Date    Allergy     Asymptomatic cholelithiasis     Heart murmur     Hepatitis     gentoype 2a or 2c    Hypertension     Tuberculosis     Hx postive     Past Surgical History:   Procedure Laterality Date    CARDIAC SURGERY  1962    HAND SURGERY  2008    Left hand    HERNIA REPAIR  1967    orchiopexy, left  age 12     Allergies and medications: reviewed in epic.     ROS: see HPI    PE:  WDWN, NAD  Alert, oriented and pleasant.   Vitals:    10/27/17 0853   BP: (!) 152/72   Pulse: 76   Resp: 18   Temp: 96.4 °F (35.8 °C)   HEENT: no scleral icterus  CV: r/r/r  Lungs: chest symmetric with respirations. CTA w/ EBBS   Neuro/psych: no asterixis, oriented x3, mood and affect appropriate.   Skin: warm and dry, no c/c/e. Reddened chest, scant telangectasia ,  No sanabria erythema.      LABS:     Lab Results   Component Value Date    WBC 5.84 08/25/2017    HGB 12.0 (L) 08/25/2017     08/25/2017     Lab Results   Component Value Date    AST 23 08/25/2017    ALT 30 08/25/2017    CREATININE 1.0 08/25/2017    BILITOT 1.7 (H) 08/25/2017    ALBUMIN 4.1 08/25/2017    INR 1.1 08/25/2017     FINAL PATHOLOGIC DIAGNOSIS   LIVER; NEEDLE BIOPSY:   CHRONIC HEPATITIS C WITH MINIMAL ACTIVITY (GRADE 1 OUT OF 4)   PORTAL FIBROSIS (STAGE 1 OUT OF 4)   NO STEATOSIS.   INCREASED HEPATOCYTE IRON, 2+ OUT OF 4.   IRON AND TRICHROME WITH APPROPRIATE CONTROLS.    Diagnosed by: Julia Tomas M.D.   (Electronically Signed: 2014 15:06:40)     6yr ago   Specimen Blood    Specimen ID [id number]    Order Date  2011 11:21    Method  See Comments    Comments: Method:  A PCR-based assay was utilized to test for the following  three mutations in the HFE gene: C282Y, H63D, and S65C.  Because of the minimal effect on iron metabolism associated  with the S65C mutation, it is only reported when it is found  with the C282Y mutation (i.e. if the patient has the  C282Y/S65C genotype).   Hereditary Hemochromatosis  C282Y: Not detected.    Comments: Hemochromatosis DNA Analysis:  H63D: Not detected.   Interpretation  See Comments    Comments: Interpretation:     Impression         1. Cholelithiasis without evidence of acute cholecystitis.    2. Splenomegaly.      ______________________________________     Electronically signed by resident: DB LONG MD  Date: 17     Procedures Fibroscan Procedure      Name: Jose Sahni  Date of Procedure : 2017   :: Katarina Shaikh PA-C  Diagnosis: HCV     Probe: M     Fibroscan readin.3 KPa     Fibrosis:F2      CAP readin dB/m     Steatosis: :S1          Electronically signed by Katarina Shaikh PA-C at 2017 11:54 AM      ASSESSMENT: 63yo white male with:   1. CHRONIC HEPATITIS C GENOTYPE 2a or 2c  On Epclusa 12wk regimen, started   liver biopsy revealed grade 1 stage 1 fibrosis.    Fibroscan = F2 fibrosis   low viral load & Mildly elevated transaminases.   vaccinated for hepatitis A and B in the past     2. Mild steatosis   CAP = 244= S1  Normal transaminases     3. Intravascular hemolytic anemia.  Consistent w/ HEREDITARY SPHEROCYTOSIS per Dr. Sahni  on Folic acid supplements   splenomegaly on imaging and hyperbilirubinemia., is likely secondary to intravascular hemolytic anemia as stated in hematology note     4. H/o Positive PPD, per patient   followed with cxr   was initiated on  treatment, but was discontinued secondary to elevated transaminases (per patients report)     5. Hypertension.   stable on medications     DISCUSSION    HCV TREATMENT    We reviewed that he should continue therapy through the end.   Importance of compliance throughout the remaining duration is stressed.     We reviewed that the goal of therapy is eradication of the virus, which should limit or prevent the development of complications.  A sustained virological response is the end point of successful therapy and is defined as undetectable hcvrna in the blood 12 weeks after treatment has stopped, however will recheck at 24 weeks out, then yearly for another year.   Discussed that majority of relapses occur in the first 6 weeks following cessation of therapy.     Pt had ability to ask questions, all were answered to  satisfaction.     PLAN:   Continue w/  EPCLUSA 12 week regimen, one pill once daily.    Labs today -- cmp & hcv, instead of 10/31.   End of treatment labs: 12/12/17     Do recommend he continue to follow in hepatology even if HCV is cured, due to mild hepatic steatosis   Recall one year w/ repeat fibroscan

## 2017-10-30 LAB
HCV LOG: <1.08 LOG (10) IU/ML
HCV RNA QUANT PCR: <12 IU/ML
HCV, QUALITATIVE: NOT DETECTED IU/ML

## 2017-10-31 ENCOUNTER — EPISODE CHANGES (OUTPATIENT)
Dept: HEPATOLOGY | Facility: CLINIC | Age: 62
End: 2017-10-31

## 2017-10-31 ENCOUNTER — TELEPHONE (OUTPATIENT)
Dept: HEPATOLOGY | Facility: CLINIC | Age: 62
End: 2017-10-31

## 2017-10-31 NOTE — TELEPHONE ENCOUNTER
Pt is currently week  6 of Epclusa  recommended  12  Week regimen    Lab Results   Component Value Date    AST 16 10/27/2017    ALT 15 10/27/2017    CREATININE 1.0 10/27/2017       No components found for: HCVQUAL    HCV neg     Please call pt.   Liver enzymes now normal. Medication is working.    HEP C Viral count is completely negative   Continue w/ EPCLUSA  once daily. Same time each day, no skipping or missing doses.   --reminder not to take anything for heartburn w/o talking with us first.   --no herbals.   Upcoming labs:   Wk 12-- 12/12     thanks

## 2017-12-12 ENCOUNTER — LAB VISIT (OUTPATIENT)
Dept: LAB | Facility: HOSPITAL | Age: 62
End: 2017-12-12
Payer: COMMERCIAL

## 2017-12-12 DIAGNOSIS — B18.2 CHRONIC HEPATITIS C WITHOUT HEPATIC COMA: ICD-10-CM

## 2017-12-12 LAB
ALBUMIN SERPL BCP-MCNC: 4.2 G/DL
ALP SERPL-CCNC: 63 U/L
ALT SERPL W/O P-5'-P-CCNC: 15 U/L
ANION GAP SERPL CALC-SCNC: 7 MMOL/L
AST SERPL-CCNC: 15 U/L
BILIRUB SERPL-MCNC: 2.9 MG/DL
BUN SERPL-MCNC: 22 MG/DL
CALCIUM SERPL-MCNC: 9.7 MG/DL
CHLORIDE SERPL-SCNC: 104 MMOL/L
CO2 SERPL-SCNC: 26 MMOL/L
CREAT SERPL-MCNC: 1 MG/DL
EST. GFR  (AFRICAN AMERICAN): >60 ML/MIN/1.73 M^2
EST. GFR  (NON AFRICAN AMERICAN): >60 ML/MIN/1.73 M^2
GLUCOSE SERPL-MCNC: 98 MG/DL
POTASSIUM SERPL-SCNC: 4.5 MMOL/L
PROT SERPL-MCNC: 8 G/DL
SODIUM SERPL-SCNC: 137 MMOL/L

## 2017-12-12 PROCEDURE — 87522 HEPATITIS C REVRS TRNSCRPJ: CPT

## 2017-12-12 PROCEDURE — 80053 COMPREHEN METABOLIC PANEL: CPT

## 2017-12-13 LAB
HCV LOG: <1.08 LOG (10) IU/ML
HCV RNA QUANT PCR: <12 IU/ML
HCV, QUALITATIVE: NOT DETECTED IU/ML

## 2017-12-15 ENCOUNTER — TELEPHONE (OUTPATIENT)
Dept: HEPATOLOGY | Facility: CLINIC | Age: 62
End: 2017-12-15

## 2017-12-15 NOTE — TELEPHONE ENCOUNTER
Pt is currently week 12 of Epclusa  recommended  12  Week regimen  Do recommend hepatology f/u after cure 2/2 very mild hepatic steatosis   fibroscan = F2     Lab Results   Component Value Date    AST 15 12/12/2017    ALT 15 12/12/2017    CREATININE 1.0 12/12/2017       No components found for: HCVQUAL    HCV = neg    Please call pt.   Liver enzymes remain normal &   HEP C Viral count is completely negative  Should be about done w/ treatment   If not take one daily until all are gone.   Small risk that Hep C can return beyond this point.   Upcoming labs:   1/22 & 3/5     thanks

## 2017-12-19 ENCOUNTER — EPISODE CHANGES (OUTPATIENT)
Dept: HEPATOLOGY | Facility: CLINIC | Age: 62
End: 2017-12-19

## 2017-12-19 NOTE — TELEPHONE ENCOUNTER
Pt informed of this today and episode updated in pts chart also today reminded of next set of labs for his SVR follow up. Pt is aware and will keep those lab appts.

## 2017-12-20 ENCOUNTER — EPISODE CHANGES (OUTPATIENT)
Dept: HEPATOLOGY | Facility: CLINIC | Age: 62
End: 2017-12-20

## 2018-01-22 ENCOUNTER — LAB VISIT (OUTPATIENT)
Dept: LAB | Facility: HOSPITAL | Age: 63
End: 2018-01-22
Payer: COMMERCIAL

## 2018-01-22 DIAGNOSIS — B18.2 CHRONIC HEPATITIS C WITHOUT HEPATIC COMA: ICD-10-CM

## 2018-01-22 LAB
ALBUMIN SERPL BCP-MCNC: 4.5 G/DL
ALP SERPL-CCNC: 59 U/L
ALT SERPL W/O P-5'-P-CCNC: 15 U/L
ANION GAP SERPL CALC-SCNC: 8 MMOL/L
AST SERPL-CCNC: 14 U/L
BILIRUB SERPL-MCNC: 3.1 MG/DL
BUN SERPL-MCNC: 17 MG/DL
CALCIUM SERPL-MCNC: 9.4 MG/DL
CHLORIDE SERPL-SCNC: 105 MMOL/L
CO2 SERPL-SCNC: 24 MMOL/L
CREAT SERPL-MCNC: 0.9 MG/DL
EST. GFR  (AFRICAN AMERICAN): >60 ML/MIN/1.73 M^2
EST. GFR  (NON AFRICAN AMERICAN): >60 ML/MIN/1.73 M^2
GLUCOSE SERPL-MCNC: 98 MG/DL
POTASSIUM SERPL-SCNC: 4.5 MMOL/L
PROT SERPL-MCNC: 8.4 G/DL
SODIUM SERPL-SCNC: 137 MMOL/L

## 2018-01-22 PROCEDURE — 36415 COLL VENOUS BLD VENIPUNCTURE: CPT

## 2018-01-22 PROCEDURE — 80053 COMPREHEN METABOLIC PANEL: CPT

## 2018-01-22 PROCEDURE — 87522 HEPATITIS C REVRS TRNSCRPJ: CPT

## 2018-01-24 ENCOUNTER — IMMUNIZATION (OUTPATIENT)
Dept: INTERNAL MEDICINE | Facility: CLINIC | Age: 63
End: 2018-01-24
Payer: COMMERCIAL

## 2018-01-24 ENCOUNTER — OFFICE VISIT (OUTPATIENT)
Dept: INTERNAL MEDICINE | Facility: CLINIC | Age: 63
End: 2018-01-24
Attending: FAMILY MEDICINE
Payer: COMMERCIAL

## 2018-01-24 VITALS
WEIGHT: 175.69 LBS | TEMPERATURE: 99 F | HEIGHT: 68 IN | BODY MASS INDEX: 26.63 KG/M2 | DIASTOLIC BLOOD PRESSURE: 65 MMHG | SYSTOLIC BLOOD PRESSURE: 120 MMHG

## 2018-01-24 DIAGNOSIS — Q24.9 CONGENITAL HEART ANOMALY: ICD-10-CM

## 2018-01-24 DIAGNOSIS — Z12.5 PROSTATE CANCER SCREENING: ICD-10-CM

## 2018-01-24 DIAGNOSIS — D59.8 OTHER ACQUIRED HEMOLYTIC ANEMIAS: ICD-10-CM

## 2018-01-24 DIAGNOSIS — R16.1 SPLENOMEGALY: ICD-10-CM

## 2018-01-24 DIAGNOSIS — Z00.00 ANNUAL PHYSICAL EXAM: Primary | ICD-10-CM

## 2018-01-24 DIAGNOSIS — B18.2 CHRONIC HEPATITIS C WITHOUT HEPATIC COMA: ICD-10-CM

## 2018-01-24 DIAGNOSIS — I10 HYPERTENSION, ESSENTIAL: ICD-10-CM

## 2018-01-24 LAB
HCV LOG: <1.08 LOG (10) IU/ML
HCV RNA QUANT PCR: <12 IU/ML
HCV, QUALITATIVE: NOT DETECTED IU/ML

## 2018-01-24 PROCEDURE — 99396 PREV VISIT EST AGE 40-64: CPT | Mod: S$GLB,,, | Performed by: FAMILY MEDICINE

## 2018-01-24 PROCEDURE — 99999 PR PBB SHADOW E&M-EST. PATIENT-LVL III: CPT | Mod: PBBFAC,,, | Performed by: FAMILY MEDICINE

## 2018-01-24 RX ORDER — HYDROCORTISONE 1 %
CREAM (GRAM) TOPICAL 3 TIMES DAILY
Qty: 30 G | Refills: 1 | Status: SHIPPED | OUTPATIENT
Start: 2018-01-24 | End: 2019-11-13 | Stop reason: ALTCHOICE

## 2018-01-24 NOTE — PROGRESS NOTES
Subjective:       Patient ID: Jose Sahni is a 62 y.o. male.    Chief Complaint: Annual Exam    Established patient for an annual wellness check/physical exam. No specific complaints, please see ROS.        Review of Systems   Constitutional: Negative for chills, fatigue and fever.   HENT: Negative for congestion and trouble swallowing.    Eyes: Negative for redness.   Respiratory: Negative for cough, chest tightness and shortness of breath.    Cardiovascular: Negative for chest pain, palpitations and leg swelling.   Gastrointestinal: Negative for abdominal pain and blood in stool.   Genitourinary: Negative for hematuria.   Musculoskeletal: Negative for arthralgias, back pain, gait problem, joint swelling, myalgias and neck pain.   Skin: Positive for rash. Negative for color change.   Neurological: Negative for tremors, speech difficulty, weakness, numbness and headaches.   Hematological: Negative for adenopathy. Does not bruise/bleed easily.   Psychiatric/Behavioral: Negative for behavioral problems, confusion and sleep disturbance. The patient is not nervous/anxious.        Objective:      Physical Exam   Constitutional: He appears well-developed and well-nourished.   HENT:   Head: Normocephalic.   Right Ear: Tympanic membrane normal.   Left Ear: Tympanic membrane normal.   Mouth/Throat: Oropharynx is clear and moist.   Eyes: Pupils are equal, round, and reactive to light.   Neck: Normal range of motion. Neck supple. Carotid bruit is not present. No thyromegaly present.   Cardiovascular: Normal rate, regular rhythm, normal heart sounds and intact distal pulses.  Exam reveals no gallop and no friction rub.    No murmur heard.  Pulmonary/Chest: Effort normal and breath sounds normal.   Abdominal: Soft. Bowel sounds are normal. He exhibits no distension and no mass. There is no tenderness. There is no rebound and no guarding.   Musculoskeletal: Normal range of motion. He exhibits no edema or tenderness.    Lymphadenopathy:     He has no cervical adenopathy.   Neurological: He is alert. He has normal strength. He displays normal reflexes. No cranial nerve deficit or sensory deficit. Coordination and gait normal.   Skin: Skin is warm and dry.   Psychiatric: He has a normal mood and affect. His behavior is normal. Judgment and thought content normal.   Nursing note and vitals reviewed.      Assessment:       1. Annual physical exam    2. Chronic hepatitis C without hepatic coma    3. Other acquired hemolytic anemias    4. Hypertension, essential    5. Congenital heart anomaly    6. Prostate cancer screening    7. Splenomegaly        Plan:   Jose was seen today for annual exam.    Diagnoses and all orders for this visit:    Annual physical exam  -     Lipid panel; Future  -     PSA, Screening; Future    Chronic hepatitis C without hepatic coma    Other acquired hemolytic anemias    Hypertension, essential    Congenital heart anomaly  -     EKG 12-lead; Future  -     2D echo with color flow doppler; Future    Prostate cancer screening  -     PSA, Screening; Future    Splenomegaly    Other orders  -     hydrocortisone 1 % cream; Apply topically 3 (three) times daily.      See meds, orders, follow up, routing and instructions sections of encounter.  A 62-year-old gentleman.  He is in for an annual physical examination.  He is   having some itching ears, otherwise no hearing loss.  He is currently under   treatment for hepatitis C.    Trial of OTC HC 1%.  He will follow up with me p.r.n. for nonresolution,   worsening.  Continue consultant visits.  I did update his problem list.    Reviewing Dr. Sahni's note, he has a history of congenital heart repair as a   child or infant.  We will go ahead reimage echo and EKG.  He has no current   cardiovascular symptoms even with walking or modest exertion.  He has had no   problems for the years and has not seen a cardiologist recently.      TAYLOR/HN  dd: 01/24/2018 18:12:08 (CST)   td: 01/25/2018 10:34:42 (CST)  Doc ID   #[id number]  Job ID #[id number]    CC:

## 2018-01-25 ENCOUNTER — LAB VISIT (OUTPATIENT)
Dept: LAB | Facility: HOSPITAL | Age: 63
End: 2018-01-25
Attending: FAMILY MEDICINE
Payer: COMMERCIAL

## 2018-01-25 DIAGNOSIS — Z12.5 PROSTATE CANCER SCREENING: ICD-10-CM

## 2018-01-25 DIAGNOSIS — Z00.00 ANNUAL PHYSICAL EXAM: ICD-10-CM

## 2018-01-25 LAB
CHOLEST SERPL-MCNC: 141 MG/DL
CHOLEST/HDLC SERPL: 3.8 {RATIO}
COMPLEXED PSA SERPL-MCNC: 1.5 NG/ML
HDLC SERPL-MCNC: 37 MG/DL
HDLC SERPL: 26.2 %
LDLC SERPL CALC-MCNC: 83.4 MG/DL
NONHDLC SERPL-MCNC: 104 MG/DL
TRIGL SERPL-MCNC: 103 MG/DL

## 2018-01-25 PROCEDURE — 84153 ASSAY OF PSA TOTAL: CPT

## 2018-01-25 PROCEDURE — 90686 IIV4 VACC NO PRSV 0.5 ML IM: CPT | Mod: S$GLB,,, | Performed by: FAMILY MEDICINE

## 2018-01-25 PROCEDURE — 36415 COLL VENOUS BLD VENIPUNCTURE: CPT

## 2018-01-25 PROCEDURE — 80061 LIPID PANEL: CPT

## 2018-01-25 PROCEDURE — 90471 IMMUNIZATION ADMIN: CPT | Mod: S$GLB,,, | Performed by: FAMILY MEDICINE

## 2018-01-26 ENCOUNTER — TELEPHONE (OUTPATIENT)
Dept: HEPATOLOGY | Facility: CLINIC | Age: 63
End: 2018-01-26

## 2018-01-26 NOTE — TELEPHONE ENCOUNTER
HCV LAB REVIEW - Katarina's patient  completed 12weeks of Epclusa - 12/12/17  Monique 2  F2 on Fibroscan      1/22/18   HCV = neg -- SVR6     Please call pt.   HEP C Viral count is completely negative  There is still a small risk that Hep C can return beyond this point.     Next labs  HCV - SVR12 - 3/5      thanks

## 2018-01-29 ENCOUNTER — TELEPHONE (OUTPATIENT)
Dept: INTERNAL MEDICINE | Facility: CLINIC | Age: 63
End: 2018-01-29

## 2018-01-29 ENCOUNTER — EPISODE CHANGES (OUTPATIENT)
Dept: HEPATOLOGY | Facility: CLINIC | Age: 63
End: 2018-01-29

## 2018-01-29 DIAGNOSIS — Z12.5 PROSTATE CANCER SCREENING: Primary | ICD-10-CM

## 2018-01-29 NOTE — TELEPHONE ENCOUNTER
----- Message from Sanaz Wall sent at 1/29/2018  2:32 PM CST -----  Contact: self/[phone]      Patient is returning a phone call.  Who left a message for the patient: Dr Lanza  Does patient know what this is regarding:  Test result  Comments: Thank you!!!

## 2018-01-29 NOTE — TELEPHONE ENCOUNTER
LVM with normal results on pts VM today asked pt to call our office back with any further questions or concerns, episode in pts chart updated today and lab appt mailed out to the pt today also.

## 2018-02-03 ENCOUNTER — TELEPHONE (OUTPATIENT)
Dept: INTERNAL MEDICINE | Facility: CLINIC | Age: 63
End: 2018-02-03

## 2018-02-03 NOTE — TELEPHONE ENCOUNTER
----- Message from Lizzette Rasheed sent at 2/1/2018 12:03 PM CST -----  Contact: Pt  Pt called to speak to the nurse regarding his care and to schedule his echo and ekg and would like a call back today.    Pt can be reached at [phone].    Thanks

## 2018-02-07 ENCOUNTER — OFFICE VISIT (OUTPATIENT)
Dept: DERMATOLOGY | Facility: CLINIC | Age: 63
End: 2018-02-07
Payer: COMMERCIAL

## 2018-02-07 DIAGNOSIS — D22.9 MULTIPLE BENIGN NEVI: ICD-10-CM

## 2018-02-07 DIAGNOSIS — L81.4 LENTIGINES: ICD-10-CM

## 2018-02-07 DIAGNOSIS — L21.9 SEBORRHEIC DERMATITIS: ICD-10-CM

## 2018-02-07 DIAGNOSIS — L57.0 ACTINIC KERATOSIS: Primary | ICD-10-CM

## 2018-02-07 DIAGNOSIS — D48.5 NEOPLASM OF UNCERTAIN BEHAVIOR OF SKIN: ICD-10-CM

## 2018-02-07 PROCEDURE — 99999 PR PBB SHADOW E&M-EST. PATIENT-LVL II: CPT | Mod: PBBFAC,,, | Performed by: DERMATOLOGY

## 2018-02-07 PROCEDURE — 99213 OFFICE O/P EST LOW 20 MIN: CPT | Mod: S$GLB,,, | Performed by: DERMATOLOGY

## 2018-02-07 PROCEDURE — 3008F BODY MASS INDEX DOCD: CPT | Mod: S$GLB,,, | Performed by: DERMATOLOGY

## 2018-02-07 NOTE — PROGRESS NOTES
Subjective:       Patient ID:  Jose Sahni is a 63 y.o. male who presents for   Chief Complaint   Patient presents with    Skin Check     UBSE    Spot     both arms     Last OV 1/31/17    Spot  - Initial  Affected locations: left arm and right arm  Duration: 1 year  Signs / symptoms: asymptomatic  Severity: mild  Timing: constant  Aggravated by: nothing  Relieving factors/Treatments tried: nothing  Improvement on treatment: no relief    Denies personal or family h/o skin cancer    Past Medical History:   Diagnosis Date    Allergy     Asymptomatic cholelithiasis     Heart murmur     Hepatitis     gentoype 2a or 2c    Hypertension     Tuberculosis     Hx postive     Review of Systems   Constitutional: Negative.  Negative for fever, chills and fatigue.   Skin: Positive for activity-related sunscreen use and wears hat. Negative for daily sunscreen use.   Hematologic/Lymphatic: Bruises/bleeds easily.        Objective:    Physical Exam   Constitutional: He appears well-developed and well-nourished.   Neurological: He is alert and oriented to person, place, and time.   Psychiatric: He has a normal mood and affect.   Skin:   Areas Examined (abnormalities noted in diagram):   Scalp / Hair Palpated and Inspected  Head / Face Inspection Performed  Neck Inspection Performed  Chest / Axilla Inspection Performed  Abdomen Inspection Performed  Back Inspection Performed  RUE Inspected  LUE Inspection Performed  Nails and Digits Inspection Performed                   Diagram Legend     Erythematous scaling macule/papule c/w actinic keratosis       Vascular papule c/w angioma      Pigmented verrucoid papule/plaque c/w seborrheic keratosis      Yellow umbilicated papule c/w sebaceous hyperplasia      Irregularly shaped tan macule c/w lentigo     1-2 mm smooth white papules consistent with Milia      Movable subcutaneous cyst with punctum c/w epidermal inclusion cyst      Subcutaneous movable cyst c/w pilar cyst      Firm  pink to brown papule c/w dermatofibroma      Pedunculated fleshy papule(s) c/w skin tag(s)      Evenly pigmented macule c/w junctional nevus     Mildly variegated pigmented, slightly irregular-bordered macule c/w mildly atypical nevus      Flesh colored to evenly pigmented papule c/w intradermal nevus       Pink pearly papule/plaque c/w basal cell carcinoma      Erythematous hyperkeratotic cursted plaque c/w SCC      Surgical scar with no sign of skin cancer recurrence      Open and closed comedones      Inflammatory papules and pustules      Verrucoid papule consistent consistent with wart     Erythematous eczematous patches and plaques     Dystrophic onycholytic nail with subungual debris c/w onychomycosis     Umbilicated papule    Erythematous-base heme-crusted tan verrucoid plaque consistent with inflamed seborrheic keratosis     Erythematous Silvery Scaling Plaque c/w Psoriasis     See annotation      Assessment / Plan:        Actinic keratosis  -     Photodynamic Therapy; Future  -     Photodynamic Therapy; Future    Lentigines  These are benign hyperpigmented sun induced lesions. Daily sun protection will reduce the number of new lesions. Treatment of these benign lesions (such as IPL or topicals such as HQ) are considered cosmetic.    Multiple benign nevi  Reassurance that his nevi appear benign with regular and consistent pigment pattern on dermoscopy    Neoplasm of uncertain behavior of skin  Likely angioma. Stable x almost 2 years. Will continue to monitor    Seborrheic dermatitis  Discussed Dx.  Ok to use OTC hydrocortisone 1% cream bid prn flare           Follow-up for after PDT.

## 2018-02-19 ENCOUNTER — HOSPITAL ENCOUNTER (OUTPATIENT)
Dept: CARDIOLOGY | Facility: CLINIC | Age: 63
Discharge: HOME | End: 2018-02-19
Attending: FAMILY MEDICINE
Payer: COMMERCIAL

## 2018-02-19 DIAGNOSIS — Q24.9 CONGENITAL HEART ANOMALY: ICD-10-CM

## 2018-02-19 LAB
MITRAL VALVE REGURGITATION: NORMAL
RETIRED EF AND QEF - SEE NOTES: 60 (ref 55–65)

## 2018-02-19 PROCEDURE — 93320 DOPPLER ECHO COMPLETE: CPT | Mod: S$GLB,,, | Performed by: INTERNAL MEDICINE

## 2018-02-19 PROCEDURE — 93303 ECHO TRANSTHORACIC: CPT | Mod: S$GLB,,, | Performed by: INTERNAL MEDICINE

## 2018-02-19 PROCEDURE — 93000 ELECTROCARDIOGRAM COMPLETE: CPT | Mod: S$GLB,,, | Performed by: INTERNAL MEDICINE

## 2018-02-19 PROCEDURE — 93325 DOPPLER ECHO COLOR FLOW MAPG: CPT | Mod: S$GLB,,, | Performed by: INTERNAL MEDICINE

## 2018-02-21 ENCOUNTER — TELEPHONE (OUTPATIENT)
Dept: DERMATOLOGY | Facility: CLINIC | Age: 63
End: 2018-02-21

## 2018-03-02 ENCOUNTER — TELEPHONE (OUTPATIENT)
Dept: INTERNAL MEDICINE | Facility: CLINIC | Age: 63
End: 2018-03-02

## 2018-03-02 NOTE — TELEPHONE ENCOUNTER
Called patient and discussed labs and or test results. Patient expressed understanding and had the opportunity to ask questions. Any questions were answered. See meds, orders, follow up and instructions sections of encounter.    Specific issues include:  Echo report - no major issues noted and he is asymptomatic. He states not interested in referral to adult congenital clinic and we can repeat the scan in a couple of years.

## 2018-03-05 ENCOUNTER — LAB VISIT (OUTPATIENT)
Dept: LAB | Facility: HOSPITAL | Age: 63
End: 2018-03-05
Payer: COMMERCIAL

## 2018-03-05 DIAGNOSIS — B18.2 CHRONIC HEPATITIS C WITHOUT HEPATIC COMA: ICD-10-CM

## 2018-03-05 LAB
ALBUMIN SERPL BCP-MCNC: 4.5 G/DL
ALP SERPL-CCNC: 54 U/L
ALT SERPL W/O P-5'-P-CCNC: 13 U/L
ANION GAP SERPL CALC-SCNC: 9 MMOL/L
AST SERPL-CCNC: 14 U/L
BILIRUB SERPL-MCNC: 2.8 MG/DL
BUN SERPL-MCNC: 16 MG/DL
CALCIUM SERPL-MCNC: 9.8 MG/DL
CHLORIDE SERPL-SCNC: 106 MMOL/L
CO2 SERPL-SCNC: 24 MMOL/L
CREAT SERPL-MCNC: 0.9 MG/DL
EST. GFR  (AFRICAN AMERICAN): >60 ML/MIN/1.73 M^2
EST. GFR  (NON AFRICAN AMERICAN): >60 ML/MIN/1.73 M^2
GLUCOSE SERPL-MCNC: 101 MG/DL
POTASSIUM SERPL-SCNC: 5.1 MMOL/L
PROT SERPL-MCNC: 7.8 G/DL
SODIUM SERPL-SCNC: 139 MMOL/L

## 2018-03-05 PROCEDURE — 80053 COMPREHEN METABOLIC PANEL: CPT

## 2018-03-05 PROCEDURE — 87522 HEPATITIS C REVRS TRNSCRPJ: CPT

## 2018-03-05 PROCEDURE — 36415 COLL VENOUS BLD VENIPUNCTURE: CPT

## 2018-03-07 LAB
HCV LOG: <1.08 LOG (10) IU/ML
HCV RNA QUANT PCR: <12 IU/ML
HCV, QUALITATIVE: NOT DETECTED IU/ML

## 2018-03-08 ENCOUNTER — EPISODE CHANGES (OUTPATIENT)
Dept: HEPATOLOGY | Facility: CLINIC | Age: 63
End: 2018-03-08

## 2018-03-08 ENCOUNTER — CLINICAL SUPPORT (OUTPATIENT)
Dept: DERMATOLOGY | Facility: CLINIC | Age: 63
End: 2018-03-08
Payer: COMMERCIAL

## 2018-03-08 DIAGNOSIS — L57.0 ACTINIC KERATOSIS: ICD-10-CM

## 2018-03-08 PROCEDURE — 99499 UNLISTED E&M SERVICE: CPT | Mod: S$GLB,,, | Performed by: DERMATOLOGY

## 2018-03-08 PROCEDURE — 99999 PR PBB SHADOW E&M-EST. PATIENT-LVL II: CPT | Mod: PBBFAC,,,

## 2018-03-08 PROCEDURE — 96567 PDT DSTR PRMLG LES SKN: CPT | Mod: S$GLB,,, | Performed by: DERMATOLOGY

## 2018-03-08 NOTE — PROGRESS NOTES
Photodynamic Therapy Note.    PDT ordered per Dr. Simental    Patient here today for 1 st treatment of actinic keratoses using photodynamic therapy.  Risks including but not limited to burning, stinging, redness, swelling, crusting or blistering of the skin of the area treated were discussed with patient.  Patient elects to proceed with photodynamic therapy.    Treatment area:  Forearms & hands  Treatment area cleaned with rubbing alcohol, Levulan Kerastick (2) applied evenly to entire surface and allowed to absorb for 90 minutes.  Patient then placed under Noam-U light for 16 minutes 40 seconds.    Patient tolerated treatment well with only mild but tolerable symptoms of discomfort.  Area washed gently with mild soap and water; Zinc oxide sunscreen applied.  Patient advised to avoid any significant light exposure (sun and artificial) for next 48 hours.    RTC:  In 1 month or sooner if concern arises.

## 2018-03-12 ENCOUNTER — EPISODE CHANGES (OUTPATIENT)
Dept: HEPATOLOGY | Facility: CLINIC | Age: 63
End: 2018-03-12

## 2018-03-12 ENCOUNTER — TELEPHONE (OUTPATIENT)
Dept: HEPATOLOGY | Facility: CLINIC | Age: 63
End: 2018-03-12

## 2018-03-12 DIAGNOSIS — Z86.19 HISTORY OF HEPATITIS C: ICD-10-CM

## 2018-03-12 NOTE — TELEPHONE ENCOUNTER
HCV LAB REVIEW - Katarina's patient  completed 12weeks of Epclusa - 12/12/17  Monique 2  F2 on Fibroscan      3/5/18  HCV = neg  CMP stable - TBili elevation felt due to intravascular hemolytic anemia and not indicative of advanced liver disease     These labs document SVR12 following successful HCV treatment with Epclusa  This is consistent with a cure. Relapse is not expected.   No immunity is conferred and patient could be reinfected.     Pt has been notified       Please schedule labs - HCV RNA - SVR24 - 6/5/18

## 2018-03-14 DIAGNOSIS — Q23.1 BICUSPID AORTIC VALVE: Primary | ICD-10-CM

## 2018-03-14 PROBLEM — Q23.81 BICUSPID AORTIC VALVE: Status: ACTIVE | Noted: 2018-03-14

## 2018-03-14 RX ORDER — LOSARTAN POTASSIUM 100 MG/1
100 TABLET ORAL DAILY
Qty: 180 TABLET | Refills: 1 | Status: SHIPPED | OUTPATIENT
Start: 2018-03-14 | End: 2019-03-26 | Stop reason: SDUPTHER

## 2018-03-14 NOTE — TELEPHONE ENCOUNTER
Called patient and discussed labs and or test results. Patient expressed understanding and had the opportunity to ask questions. Any questions were answered. See meds, orders, follow up and instructions sections of encounter.    Specific issues include:    Bicuspid aortic valve - will get a routine cardiology referral -

## 2018-03-16 ENCOUNTER — TELEPHONE (OUTPATIENT)
Dept: DERMATOLOGY | Facility: CLINIC | Age: 63
End: 2018-03-16

## 2018-03-19 ENCOUNTER — TELEPHONE (OUTPATIENT)
Dept: DERMATOLOGY | Facility: CLINIC | Age: 63
End: 2018-03-19

## 2018-03-27 ENCOUNTER — TELEPHONE (OUTPATIENT)
Dept: DERMATOLOGY | Facility: CLINIC | Age: 63
End: 2018-03-27

## 2018-04-02 ENCOUNTER — TELEPHONE (OUTPATIENT)
Dept: DERMATOLOGY | Facility: CLINIC | Age: 63
End: 2018-04-02

## 2018-04-10 ENCOUNTER — OFFICE VISIT (OUTPATIENT)
Dept: CARDIOLOGY | Facility: CLINIC | Age: 63
End: 2018-04-10
Attending: FAMILY MEDICINE
Payer: COMMERCIAL

## 2018-04-10 VITALS
DIASTOLIC BLOOD PRESSURE: 70 MMHG | HEART RATE: 72 BPM | BODY MASS INDEX: 26.63 KG/M2 | HEIGHT: 68 IN | SYSTOLIC BLOOD PRESSURE: 160 MMHG | WEIGHT: 175.69 LBS

## 2018-04-10 DIAGNOSIS — Q23.1 BICUSPID AORTIC VALVE: Primary | ICD-10-CM

## 2018-04-10 DIAGNOSIS — Q24.9 CONGENITAL HEART ANOMALY: ICD-10-CM

## 2018-04-10 DIAGNOSIS — I10 HYPERTENSION, ESSENTIAL: ICD-10-CM

## 2018-04-10 PROCEDURE — 99999 PR PBB SHADOW E&M-EST. PATIENT-LVL III: CPT | Mod: PBBFAC,,, | Performed by: INTERNAL MEDICINE

## 2018-04-10 PROCEDURE — 99244 OFF/OP CNSLTJ NEW/EST MOD 40: CPT | Mod: S$GLB,,, | Performed by: INTERNAL MEDICINE

## 2018-04-10 NOTE — PROGRESS NOTES
Subjective:    Patient ID:  Jose Sahni is a 63 y.o. male who presents for evaluation of Hypertension      HPI   The patient is 63 year old male referred by Dr Cordero for CV evaluation. The patient states that at age 8 he had heart surgery that fixed a hole between his lower chambers, the valve going to his lung and untwined major blood vessels. He has been in good health and in the [employer] for 40 years. He is active and exercises regularly. He is being treated for hypertension by Dr Cordero and has had treatment for HCV. His home BP are usually less than 130 systolic. His father  of a heart attack at age 40 , mother of a stroke and is a non smoker      CONCLUSIONS     1 - Normal left ventricular systolic function (EF 60-65%).     2 - Mild left atrial enlargement.     3 - Bi-leaflet aortic valve without stenosis or regurgitation.     4 - Mild mitral regurgitation.     5 - Moderate pulmonic regurgitation.             This document has been electronically    SIGNED BY: Haley Leigh MD On: 2018 09  Lab Results   Component Value Date     2018    K 5.1 2018     2018    CO2 24 2018    BUN 16 2018    CREATININE 0.9 2018     2018    AST 14 2018    ALT 13 2018    ALBUMIN 4.5 2018    PROT 7.8 2018    BILITOT 2.8 (H) 2018    WBC 5.84 2017    HGB 12.0 (L) 2017    HCT 34.1 (L) 2017    MCV 88 2017     2017    INR 1.1 2017    PSA 1.5 2018    TSH 1.84 2011         Lab Results   Component Value Date    CHOL 141 2018    HDL 37 (L) 2018    TRIG 103 2018       Lab Results   Component Value Date    LDLCALC 83.4 2018       Past Medical History:   Diagnosis Date    Allergy     Asymptomatic cholelithiasis     Heart murmur     History of hepatitis C,s/p successful Rx w/ SVR12 (cure) - 3/2018 10/18/2016    GT2, tx naive   liver biopsy, grade 1  stage 1 9-2017 Fibroscan = F2   Completed Epclusa 12wks w/ SVR    Hypertension     Tuberculosis     Hx postive       Current Outpatient Prescriptions:     folic acid (FOLVITE) 1 MG tablet, Take 1 mg by mouth once daily., Disp: , Rfl:     hydrocortisone 1 % cream, Apply topically 3 (three) times daily., Disp: 30 g, Rfl: 1    losartan (COZAAR) 100 MG tablet, TAKE 1 TABLET (100 MG TOTAL) BY MOUTH ONCE DAILY., Disp: 180 tablet, Rfl: 1    sildenafil (VIAGRA) 100 MG tablet, Take 1 tablet (100 mg total) by mouth daily as needed for Erectile Dysfunction., Disp: 6 tablet, Rfl: 11    Current Facility-Administered Medications:     aminolevulinic acid HCl 20 % Soln 2 Stick, 2 Stick, Topical (Top), 1 time in Clinic/HOD, Mckenna Simental MD        Review of Systems   Constitution: Negative for decreased appetite, diaphoresis, fever, weakness, malaise/fatigue, weight gain and weight loss.   HENT: Negative for congestion, ear discharge, ear pain and nosebleeds.    Eyes: Negative for blurred vision, double vision and visual disturbance.   Cardiovascular: Negative for chest pain, claudication, cyanosis, dyspnea on exertion, irregular heartbeat, leg swelling, near-syncope, orthopnea, palpitations, paroxysmal nocturnal dyspnea and syncope.   Respiratory: Negative for cough, hemoptysis, shortness of breath, sleep disturbances due to breathing, snoring, sputum production and wheezing.    Endocrine: Negative for polydipsia, polyphagia and polyuria.   Hematologic/Lymphatic: Negative for adenopathy and bleeding problem. Does not bruise/bleed easily.   Skin: Negative for color change, nail changes, poor wound healing and rash.   Musculoskeletal: Negative for muscle cramps and muscle weakness.   Gastrointestinal: Negative for abdominal pain, anorexia, change in bowel habit, hematochezia, nausea and vomiting.   Genitourinary: Negative for dysuria, frequency and hematuria.   Neurological: Negative for brief paralysis, difficulty with  "concentration, excessive daytime sleepiness, dizziness, focal weakness, headaches, light-headedness, seizures and vertigo.   Psychiatric/Behavioral: Negative for altered mental status and depression.   Allergic/Immunologic: Negative for persistent infections.        Objective:BP (!) 160/70   Pulse 72   Ht 5' 8" (1.727 m)   Wt 79.7 kg (175 lb 11.3 oz)   BMI 26.72 kg/m²           Physical Exam   Constitutional: He is oriented to person, place, and time. He appears well-developed and well-nourished.   HENT:   Head: Normocephalic.   Right Ear: External ear normal.   Left Ear: External ear normal.   Nose: Nose normal.   Inspection of lips, teeth and gums normal   Eyes: EOM are normal. Pupils are equal, round, and reactive to light. No scleral icterus.   Neck: Normal range of motion. Neck supple. No JVD present. No tracheal deviation present. No thyromegaly present.   Cardiovascular: Normal rate, regular rhythm and intact distal pulses.  Exam reveals no gallop and no friction rub.    No murmur heard.  Pulses:       Carotid pulses are 2+ on the right side, and 2+ on the left side.       Dorsalis pedis pulses are 2+ on the right side, and 2+ on the left side.        Posterior tibial pulses are 2+ on the right side, and 2+ on the left side.   Pulmonary/Chest: Effort normal and breath sounds normal.       Abdominal: Bowel sounds are normal. He exhibits no distension. There is no hepatosplenomegaly. There is no tenderness. There is no guarding.   Musculoskeletal: Normal range of motion. He exhibits no edema or tenderness.   Lymphadenopathy:   Palpation of neck and groin lymph nodes normal   Neurological: He is alert and oriented to person, place, and time. No cranial nerve deficit. He exhibits normal muscle tone. Coordination normal.   Skin: Skin is dry.   Palpation of skin normal   Psychiatric: His behavior is normal. Judgment and thought content normal.         Assessment:       1. Bicuspid aortic valve    2. Congenital " heart anomaly    3. Hypertension, essential         Plan:       Jose was seen today for hypertension.    Diagnoses and all orders for this visit:    Bicuspid aortic valve  No further evaluation at this time  Recommended a 2 year follow up or as needed  Congenital heart anomaly    Hypertension, essential

## 2018-05-01 ENCOUNTER — LAB VISIT (OUTPATIENT)
Dept: LAB | Facility: HOSPITAL | Age: 63
End: 2018-05-01
Attending: FAMILY MEDICINE
Payer: COMMERCIAL

## 2018-05-01 DIAGNOSIS — Z12.5 PROSTATE CANCER SCREENING: ICD-10-CM

## 2018-05-01 LAB — COMPLEXED PSA SERPL-MCNC: 1.1 NG/ML

## 2018-05-01 PROCEDURE — 36415 COLL VENOUS BLD VENIPUNCTURE: CPT

## 2018-05-01 PROCEDURE — 84153 ASSAY OF PSA TOTAL: CPT

## 2018-06-05 ENCOUNTER — LAB VISIT (OUTPATIENT)
Dept: LAB | Facility: HOSPITAL | Age: 63
End: 2018-06-05
Attending: PHYSICIAN ASSISTANT
Payer: COMMERCIAL

## 2018-06-05 DIAGNOSIS — Z86.19 HISTORY OF HEPATITIS C: ICD-10-CM

## 2018-06-05 PROCEDURE — 87522 HEPATITIS C REVRS TRNSCRPJ: CPT

## 2018-06-05 PROCEDURE — 36415 COLL VENOUS BLD VENIPUNCTURE: CPT

## 2018-06-07 DIAGNOSIS — Z86.19 HISTORY OF HEPATITIS C: Primary | ICD-10-CM

## 2018-06-07 LAB
HCV LOG: <1.08 LOG (10) IU/ML
HCV RNA QUANT PCR: <12 IU/ML
HCV, QUALITATIVE: NOT DETECTED IU/ML

## 2018-06-08 ENCOUNTER — TELEPHONE (OUTPATIENT)
Dept: HEPATOLOGY | Facility: CLINIC | Age: 63
End: 2018-06-08

## 2018-06-08 NOTE — TELEPHONE ENCOUNTER
----- Message from Jennifer B. Scheuermann, PA sent at 6/7/2018  4:24 PM CDT -----  Virus negative 24 weeks after ending HCV therapy - indicates SVR24  Pt was notified via [facility]     Please schedule HCV RNA in 1 year

## 2018-06-13 ENCOUNTER — EPISODE CHANGES (OUTPATIENT)
Dept: HEPATOLOGY | Facility: CLINIC | Age: 63
End: 2018-06-13

## 2018-08-08 ENCOUNTER — HOSPITAL ENCOUNTER (EMERGENCY)
Facility: HOSPITAL | Age: 63
Discharge: HOME | End: 2018-08-08
Attending: EMERGENCY MEDICINE
Payer: COMMERCIAL

## 2018-08-08 VITALS
HEIGHT: 68 IN | BODY MASS INDEX: 25.76 KG/M2 | RESPIRATION RATE: 18 BRPM | HEART RATE: 83 BPM | DIASTOLIC BLOOD PRESSURE: 77 MMHG | TEMPERATURE: 98 F | WEIGHT: 170 LBS | OXYGEN SATURATION: 98 % | SYSTOLIC BLOOD PRESSURE: 164 MMHG

## 2018-08-08 DIAGNOSIS — M54.50 ACUTE BILATERAL LOW BACK PAIN WITHOUT SCIATICA: Primary | ICD-10-CM

## 2018-08-08 LAB
ALBUMIN SERPL BCP-MCNC: 5 G/DL
ALP SERPL-CCNC: 63 U/L
ALT SERPL W/O P-5'-P-CCNC: 20 U/L
ANION GAP SERPL CALC-SCNC: 8 MMOL/L
AST SERPL-CCNC: 21 U/L
BACTERIA #/AREA URNS AUTO: ABNORMAL /HPF
BASOPHILS # BLD AUTO: 0.03 K/UL
BASOPHILS NFR BLD: 0.4 %
BILIRUB SERPL-MCNC: 2.7 MG/DL
BILIRUB UR QL STRIP: NEGATIVE
BUN SERPL-MCNC: 31 MG/DL
CALCIUM SERPL-MCNC: 10 MG/DL
CHLORIDE SERPL-SCNC: 106 MMOL/L
CK SERPL-CCNC: 120 U/L
CLARITY UR REFRACT.AUTO: ABNORMAL
CO2 SERPL-SCNC: 22 MMOL/L
COLOR UR AUTO: ABNORMAL
CREAT SERPL-MCNC: 1.3 MG/DL
DIFFERENTIAL METHOD: ABNORMAL
EOSINOPHIL # BLD AUTO: 0.1 K/UL
EOSINOPHIL NFR BLD: 1.1 %
ERYTHROCYTE [DISTWIDTH] IN BLOOD BY AUTOMATED COUNT: 14.1 %
EST. GFR  (AFRICAN AMERICAN): >60 ML/MIN/1.73 M^2
EST. GFR  (NON AFRICAN AMERICAN): 58.5 ML/MIN/1.73 M^2
GLUCOSE SERPL-MCNC: 104 MG/DL
GLUCOSE UR QL STRIP: NEGATIVE
HCT VFR BLD AUTO: 38.6 %
HGB BLD-MCNC: 13.9 G/DL
HGB UR QL STRIP: NEGATIVE
HYALINE CASTS UR QL AUTO: 4 /LPF
IMM GRANULOCYTES # BLD AUTO: 0.03 K/UL
IMM GRANULOCYTES NFR BLD AUTO: 0.4 %
KETONES UR QL STRIP: NEGATIVE
LEUKOCYTE ESTERASE UR QL STRIP: NEGATIVE
LIPASE SERPL-CCNC: 18 U/L
LYMPHOCYTES # BLD AUTO: 1.7 K/UL
LYMPHOCYTES NFR BLD: 20.9 %
MCH RBC QN AUTO: 33 PG
MCHC RBC AUTO-ENTMCNC: 36 G/DL
MCV RBC AUTO: 92 FL
MICROSCOPIC COMMENT: ABNORMAL
MONOCYTES # BLD AUTO: 0.6 K/UL
MONOCYTES NFR BLD: 7.4 %
NEUTROPHILS # BLD AUTO: 5.7 K/UL
NEUTROPHILS NFR BLD: 69.8 %
NITRITE UR QL STRIP: NEGATIVE
NRBC BLD-RTO: 0 /100 WBC
PH UR STRIP: 5 [PH] (ref 5–8)
PLATELET # BLD AUTO: 173 K/UL
PMV BLD AUTO: 10 FL
POTASSIUM SERPL-SCNC: 4.6 MMOL/L
PROT SERPL-MCNC: 8.7 G/DL
PROT UR QL STRIP: NEGATIVE
RBC # BLD AUTO: 4.21 M/UL
RBC #/AREA URNS AUTO: 0 /HPF (ref 0–4)
SODIUM SERPL-SCNC: 136 MMOL/L
SP GR UR STRIP: 1.02 (ref 1–1.03)
URN SPEC COLLECT METH UR: ABNORMAL
UROBILINOGEN UR STRIP-ACNC: NEGATIVE EU/DL
WBC # BLD AUTO: 8.1 K/UL
WBC #/AREA URNS AUTO: 1 /HPF (ref 0–5)

## 2018-08-08 PROCEDURE — 85025 COMPLETE CBC W/AUTO DIFF WBC: CPT

## 2018-08-08 PROCEDURE — 96372 THER/PROPH/DIAG INJ SC/IM: CPT | Mod: 59

## 2018-08-08 PROCEDURE — 80053 COMPREHEN METABOLIC PANEL: CPT

## 2018-08-08 PROCEDURE — 25000003 PHARM REV CODE 250: Performed by: NURSE PRACTITIONER

## 2018-08-08 PROCEDURE — 82550 ASSAY OF CK (CPK): CPT

## 2018-08-08 PROCEDURE — 99284 EMERGENCY DEPT VISIT MOD MDM: CPT

## 2018-08-08 PROCEDURE — 63600175 PHARM REV CODE 636 W HCPCS: Performed by: NURSE PRACTITIONER

## 2018-08-08 PROCEDURE — 83690 ASSAY OF LIPASE: CPT

## 2018-08-08 PROCEDURE — 96375 TX/PRO/DX INJ NEW DRUG ADDON: CPT

## 2018-08-08 PROCEDURE — 96374 THER/PROPH/DIAG INJ IV PUSH: CPT

## 2018-08-08 PROCEDURE — 99283 EMERGENCY DEPT VISIT LOW MDM: CPT | Mod: ,,, | Performed by: NURSE PRACTITIONER

## 2018-08-08 PROCEDURE — 81001 URINALYSIS AUTO W/SCOPE: CPT

## 2018-08-08 RX ORDER — KETOROLAC TROMETHAMINE 30 MG/ML
15 INJECTION, SOLUTION INTRAMUSCULAR; INTRAVENOUS
Status: COMPLETED | OUTPATIENT
Start: 2018-08-08 | End: 2018-08-08

## 2018-08-08 RX ORDER — FLUCONAZOLE 100 MG/1
100 TABLET ORAL DAILY
COMMUNITY
End: 2018-08-10

## 2018-08-08 RX ORDER — METHOCARBAMOL 500 MG/1
1000 TABLET, FILM COATED ORAL 3 TIMES DAILY
Qty: 30 TABLET | Refills: 0 | Status: SHIPPED | OUTPATIENT
Start: 2018-08-08 | End: 2018-08-10 | Stop reason: SDUPTHER

## 2018-08-08 RX ORDER — DIAZEPAM 5 MG/1
5 TABLET ORAL
Status: COMPLETED | OUTPATIENT
Start: 2018-08-08 | End: 2018-08-08

## 2018-08-08 RX ORDER — ORPHENADRINE CITRATE 30 MG/ML
60 INJECTION INTRAMUSCULAR; INTRAVENOUS
Status: COMPLETED | OUTPATIENT
Start: 2018-08-08 | End: 2018-08-08

## 2018-08-08 RX ADMIN — SODIUM CHLORIDE 1000 ML: 0.9 INJECTION, SOLUTION INTRAVENOUS at 09:08

## 2018-08-08 RX ADMIN — DIAZEPAM 5 MG: 5 TABLET ORAL at 09:08

## 2018-08-08 RX ADMIN — ORPHENADRINE CITRATE 60 MG: 30 INJECTION INTRAMUSCULAR; INTRAVENOUS at 08:08

## 2018-08-08 RX ADMIN — KETOROLAC TROMETHAMINE 15 MG: 30 INJECTION, SOLUTION INTRAMUSCULAR at 08:08

## 2018-08-08 NOTE — ED PROVIDER NOTES
Encounter Date: 8/8/2018    SCRIBE #1 NOTE: I, Caitlyn Mejia, am scribing for, and in the presence of,  Dr. Dunn. I have scribed the following portions of the note - the APC attestation.       History     Chief Complaint   Patient presents with    Back Pain     after doing yard work yesterday. Woke up with lower back pain this AM.      Patient is a 63-year-old male with medical history of hepatitis-C, hypertension and diverticulosis presenting to the ED for lower back pain. Patient states he woke up at 1 o'clock this morning with 10/10 pain in his lower back.  Patient states he was working in the [employer] yesterday but does not remember any increased movement or pain during that time.  Patient denies any bowel or bladder incontinence.  Patient denies any numbness or weakness.          Review of patient's allergies indicates:   Allergen Reactions    Benazepril hcl Other (See Comments)     Coughing     Past Medical History:   Diagnosis Date    Allergy     Asymptomatic cholelithiasis     Heart murmur     History of hepatitis C,s/p successful Rx w/ SVR24 (cure) - 6/2018 10/18/2016    GT2, tx naive  2014 liver biopsy, grade 1 stage 1 9-2017 Fibroscan = F2   Completed Epclusa 12wks w/ SVR    Hypertension     Tuberculosis     Hx postive     Past Surgical History:   Procedure Laterality Date    CARDIAC SURGERY  1962    HAND SURGERY  2008    Left hand    HERNIA REPAIR  1967    orchiopexy, left  age 12     Family History   Problem Relation Age of Onset    Stroke Mother     Heart disease Father     Cancer Paternal Aunt     Cancer Paternal Uncle      Social History   Substance Use Topics    Smoking status: Never Smoker    Smokeless tobacco: Never Used    Alcohol use No     Review of Systems   Constitutional: Positive for activity change. Negative for appetite change, fatigue and fever.   HENT: Negative for sore throat.    Respiratory: Negative for chest tightness and shortness of breath.    Cardiovascular:  Negative for chest pain.   Gastrointestinal: Negative for abdominal pain and nausea.   Genitourinary: Negative for difficulty urinating and dysuria.   Musculoskeletal: Positive for back pain ( lower) and gait problem ( due to pain). Negative for neck pain and neck stiffness.   Skin: Negative for rash.   Neurological: Negative for dizziness, syncope, weakness, numbness and headaches.   Hematological: Does not bruise/bleed easily.       Physical Exam     Initial Vitals [08/08/18 0706]   BP Pulse Resp Temp SpO2   (!) 164/77 83 18 97.5 °F (36.4 °C) 98 %      MAP       --         Physical Exam    Nursing note and vitals reviewed.  Constitutional: Vital signs are normal. He appears well-developed and well-nourished. He is not diaphoretic. He is cooperative. He does not have a sickly appearance. He does not appear ill. He appears distressed ( due to pain).   HENT:   Head: Normocephalic and atraumatic.   Eyes: Pupils are equal, round, and reactive to light.   Neck: Normal range of motion. Neck supple.   Cardiovascular: Normal rate, regular rhythm and normal heart sounds.   Pulmonary/Chest: Effort normal and breath sounds normal.   Abdominal: Soft. Normal appearance and bowel sounds are normal. He exhibits no distension. There is no tenderness. There is no rigidity, no rebound and no guarding.   Musculoskeletal:        Lumbar back: He exhibits decreased range of motion, pain and spasm. He exhibits no tenderness, no bony tenderness, no swelling, no edema, no deformity, no laceration and normal pulse.   Strength 5/5 in BLE.  No L spine tenderness noted   Neurological: He is alert and oriented to person, place, and time. He has normal strength and normal reflexes. No cranial nerve deficit or sensory deficit. GCS eye subscore is 4. GCS verbal subscore is 5. GCS motor subscore is 6.   Skin: Skin is warm, dry and intact. Capillary refill takes less than 2 seconds. No abrasion, no laceration and no rash noted. No cyanosis. Nails  show no clubbing.   Psychiatric: He has a normal mood and affect. His speech is normal and behavior is normal. Judgment and thought content normal. Cognition and memory are normal.         ED Course   Procedures  Labs Reviewed   URINALYSIS, REFLEX TO URINE CULTURE - Abnormal; Notable for the following:        Result Value    Appearance, UA Hazy (*)     All other components within normal limits    Narrative:     Preferred Collection Type->Urine, Clean Catch   CBC W/ AUTO DIFFERENTIAL - Abnormal; Notable for the following:     RBC 4.21 (*)     Hemoglobin 13.9 (*)     Hematocrit 38.6 (*)     MCH 33.0 (*)     All other components within normal limits   COMPREHENSIVE METABOLIC PANEL - Abnormal; Notable for the following:     CO2 22 (*)     BUN, Bld 31 (*)     Total Protein 8.7 (*)     Total Bilirubin 2.7 (*)     eGFR if non  58.5 (*)     All other components within normal limits   URINALYSIS MICROSCOPIC - Abnormal; Notable for the following:     Hyaline Casts, UA 4 (*)     All other components within normal limits    Narrative:     Preferred Collection Type->Urine, Clean Catch   CK   LIPASE    Narrative:     add on LIPAS per: Annmarie Holley NP  08/08/2018  11:16           Imaging Results    None          Medical Decision Making:   History:   Old Medical Records: I decided to obtain old medical records.  Clinical Tests:   Lab Tests: Ordered and Reviewed       APC / Resident Notes:   Emergent evaluation of a 62 yo male patient presenting to the ER with chief complaint of low back pain since last night.  Patient states he woke up at 1:00 a.m. with increased pain in his lower back.  On exam patient A&O x3. Breath sounds clear.  Abdomen soft and nontender.  Patient denies L-spine pain to palpation.  Patient states increased bilateral lower back pain worse with movement.  Not reproducible to palpation.I will get urine, medicate and reassess.   Differential diagnoses include but are not limited to lumbar  strain/sprain, herniated disk, spinal stenosis, compression fracture, degenerative disc disease, cauda equina, spondylolysis, discitis, malignancy, connective tissue disease, pyelonephritis, and others. I discussed the care of this patient with my Supervising Physician.      Patient denies any relief from IM pain medication.Patient's CBC unremarkable. CMP remarkable for a BUN of 31 a total protein of 8.7 a total bili of 2.7 which is at patient's baseline.  Urine negative. Patient reassessed after IV fluids and states feeling much better.  The patient and wife updated on lab results. Patient is hemodynamically stable, vital signs are normal. Discharge instructions given. Prescription for Robaxin given and explained. Return to ED precautions discussed. Follow up as directed. Pt verbalized understanding of this plan.  Pt is stable for discharge.          Scribe Attestation:   Scribe #1: I performed the above scribed service and the documentation accurately describes the services I performed. I attest to the accuracy of the note.    Attending Attestation:     Physician Attestation Statement for NP/PA:   I discussed this assessment and plan of this patient with the NP/PA, but I did not personally examine the patient. The face to face encounter was performed by the NP/PA.    Other NP/PA Attestation Additions:    History of Present Illness: 63 y.o. man presents for evaluation of low back pain after recent exertion.                       Clinical Impression:   The encounter diagnosis was Acute bilateral low back pain without sciatica.      Disposition:   Disposition: Discharged  Condition: Stable                        Annmarie Holley NP  08/08/18 4239

## 2018-08-08 NOTE — ED NOTES
"Pt reports "low back hurting, was doing yard work yesterday but nothing out of ordinary" denies dysuria, hematuria. Pain increases with movement.   Presents to ER with complaint of low back pain since 1 am  Patient's name and date of birth checked and is correct.  LOC: The patient is awake, alert and aware of environment with an appropriate affect, the patient is oriented x 3 and speaking appropriately.  APPEARANCE: Patient resting comfortably and in no acute distress, patient is clean and well groomed, patient's clothing is properly fastened.  CARDIOVASCULAR:  Heart rate regular and even with no peripheral edema noted.  SKIN: The skin is warm and dry, patient has normal skin turgor and moist mucus membranes, skin intact, no breakdown or brusing noted.   MUSKULOSKELETAL: Patient moving all extremities well, no obvious swelling or deformities noted.  RESPIRATORY: Airway is open and patent, respirations are spontaneous, patient has a normal effort and rate.   "

## 2018-08-09 ENCOUNTER — TELEPHONE (OUTPATIENT)
Dept: INTERNAL MEDICINE | Facility: CLINIC | Age: 63
End: 2018-08-09

## 2018-08-09 NOTE — TELEPHONE ENCOUNTER
----- Message from Odilia Fishman sent at 8/9/2018 12:12 PM CDT -----  Contact: Patient [phone] or [phone]  The patient said the ER told him to follow up with you as soon as possible therefore, he is asking for a sooner appointment than scheduled.    Thank you

## 2018-08-10 ENCOUNTER — OFFICE VISIT (OUTPATIENT)
Dept: INTERNAL MEDICINE | Facility: CLINIC | Age: 63
End: 2018-08-10
Attending: FAMILY MEDICINE
Payer: COMMERCIAL

## 2018-08-10 VITALS
BODY MASS INDEX: 25.73 KG/M2 | DIASTOLIC BLOOD PRESSURE: 56 MMHG | OXYGEN SATURATION: 99 % | HEIGHT: 68 IN | WEIGHT: 169.81 LBS | HEART RATE: 80 BPM | SYSTOLIC BLOOD PRESSURE: 134 MMHG

## 2018-08-10 DIAGNOSIS — Z86.19 HISTORY OF HEPATITIS C: ICD-10-CM

## 2018-08-10 DIAGNOSIS — Q24.9 CONGENITAL HEART ANOMALY: ICD-10-CM

## 2018-08-10 DIAGNOSIS — I10 HYPERTENSION, ESSENTIAL: ICD-10-CM

## 2018-08-10 DIAGNOSIS — Q23.1 BICUSPID AORTIC VALVE: ICD-10-CM

## 2018-08-10 DIAGNOSIS — M54.5 LOW BACK PAIN, UNSPECIFIED BACK PAIN LATERALITY, UNSPECIFIED CHRONICITY, WITH SCIATICA PRESENCE UNSPECIFIED: Primary | ICD-10-CM

## 2018-08-10 PROCEDURE — 3075F SYST BP GE 130 - 139MM HG: CPT | Mod: CPTII,S$GLB,, | Performed by: FAMILY MEDICINE

## 2018-08-10 PROCEDURE — 3078F DIAST BP <80 MM HG: CPT | Mod: CPTII,S$GLB,, | Performed by: FAMILY MEDICINE

## 2018-08-10 PROCEDURE — 99999 PR PBB SHADOW E&M-EST. PATIENT-LVL III: CPT | Mod: PBBFAC,,, | Performed by: FAMILY MEDICINE

## 2018-08-10 PROCEDURE — 3008F BODY MASS INDEX DOCD: CPT | Mod: CPTII,S$GLB,, | Performed by: FAMILY MEDICINE

## 2018-08-10 PROCEDURE — 99214 OFFICE O/P EST MOD 30 MIN: CPT | Mod: S$GLB,,, | Performed by: FAMILY MEDICINE

## 2018-08-10 RX ORDER — METHOCARBAMOL 500 MG/1
1000 TABLET, FILM COATED ORAL 3 TIMES DAILY
Qty: 30 TABLET | Refills: 0 | Status: SHIPPED | OUTPATIENT
Start: 2018-08-10 | End: 2018-08-15

## 2018-08-10 RX ORDER — METHYLPREDNISOLONE 4 MG/1
TABLET ORAL
Qty: 1 PACKAGE | Refills: 0 | Status: SHIPPED | OUTPATIENT
Start: 2018-08-10 | End: 2019-01-23 | Stop reason: ALTCHOICE

## 2018-08-10 NOTE — PROGRESS NOTES
Subjective:       Patient ID: Jose Sahni is a 63 y.o. male.    Chief Complaint: Follow-up (from ED)    HPI  Review of Systems   Constitutional: Negative for chills, fatigue, fever and unexpected weight change.   HENT: Negative for congestion and trouble swallowing.    Eyes: Negative for redness and visual disturbance.   Respiratory: Negative for cough, chest tightness and shortness of breath.    Cardiovascular: Negative for chest pain, palpitations and leg swelling.   Gastrointestinal: Negative for abdominal pain and blood in stool.   Genitourinary: Negative for difficulty urinating and hematuria.   Musculoskeletal: Positive for back pain. Negative for arthralgias, gait problem, joint swelling, myalgias and neck pain.   Skin: Negative for color change and rash.   Neurological: Negative for tremors, speech difficulty, weakness, numbness and headaches.   Hematological: Negative for adenopathy. Does not bruise/bleed easily.   Psychiatric/Behavioral: Negative for behavioral problems, confusion and sleep disturbance. The patient is not nervous/anxious.        Objective:      Physical Exam   Constitutional: He is oriented to person, place, and time. He appears well-developed and well-nourished. No distress.   Neck: Neck supple.   Pulmonary/Chest: Effort normal.   Abdominal: There is no tenderness. There is no rebound and no CVA tenderness.   Musculoskeletal: He exhibits no edema.        Right hip: He exhibits normal range of motion and normal strength.        Left hip: He exhibits normal range of motion and normal strength.        Thoracic back: He exhibits normal range of motion and no tenderness.        Lumbar back: He exhibits normal range of motion, no tenderness and no spasm.        Right lower leg: He exhibits no edema.        Left lower leg: He exhibits no edema.   Neurological: He is alert and oriented to person, place, and time. He has normal strength. No sensory deficit. He displays a negative Romberg sign.  Coordination and gait normal.   Reflex Scores:       Patellar reflexes are 1+ on the right side and 1+ on the left side.       Achilles reflexes are 1+ on the right side and 1+ on the left side.  Negative SLR.  Negative BONIFACIO.  Negative FADIR.   Skin: Skin is warm and dry. No rash noted.   Psychiatric: He has a normal mood and affect. His behavior is normal. Judgment and thought content normal.   Nursing note and vitals reviewed.      Assessment:       1. Low back pain, unspecified back pain laterality, unspecified chronicity, with sciatica presence unspecified    2. History of hepatitis C,s/p successful Rx w/ SVR24 (cure) - 6/2018    3. Hypertension, essential    4. Congenital heart anomaly    5. Bicuspid aortic valve        Plan:   Jose was seen today for follow-up.    Diagnoses and all orders for this visit:    Low back pain, unspecified back pain laterality, unspecified chronicity, with sciatica presence unspecified    History of hepatitis C,s/p successful Rx w/ SVR24 (cure) - 6/2018    Hypertension, essential    Congenital heart anomaly    Bicuspid aortic valve    Other orders  -     methylPREDNISolone (MEDROL DOSEPACK) 4 mg tablet; use as directed  -     methocarbamol (ROBAXIN) 500 MG Tab; Take 2 tablets (1,000 mg total) by mouth 3 (three) times daily. for 5 days      See meds, orders, follow up, routing and instructions sections of encounter.  A 63-year-old established male patient, acute onset back pain a few days ago,   was seen in the Emergency Room on August 8th.  They did do blood work.  I did   review that.  He had a negative urinalysis.  He had no x-rays.  He is doing a   bit better at this time.  They placed him on some muscle relaxers.  He states it   is worse in the morning.  There is no fever, chills, constitutional complaints,   etc.    RECOMMENDATIONS:  Medrol Dosepak.  Continue home exercises.  Follow up with me   in a few weeks and refill methocarbamol.      JUANJOSE  dd: 08/10/2018 17:39:42  (CDT)  td: 08/10/2018 20:54:10 (CDT)  Doc ID   #[id number]  Job ID #[id number]    CC:

## 2019-01-23 ENCOUNTER — OFFICE VISIT (OUTPATIENT)
Dept: INTERNAL MEDICINE | Facility: CLINIC | Age: 64
End: 2019-01-23
Attending: FAMILY MEDICINE
Payer: COMMERCIAL

## 2019-01-23 VITALS
SYSTOLIC BLOOD PRESSURE: 126 MMHG | BODY MASS INDEX: 25.9 KG/M2 | WEIGHT: 170.88 LBS | DIASTOLIC BLOOD PRESSURE: 50 MMHG | HEART RATE: 73 BPM | TEMPERATURE: 99 F | HEIGHT: 68 IN | OXYGEN SATURATION: 97 %

## 2019-01-23 DIAGNOSIS — Z86.19 HISTORY OF HEPATITIS C: ICD-10-CM

## 2019-01-23 DIAGNOSIS — Q23.1 BICUSPID AORTIC VALVE: ICD-10-CM

## 2019-01-23 DIAGNOSIS — Z12.5 PROSTATE CANCER SCREENING: ICD-10-CM

## 2019-01-23 DIAGNOSIS — D58.0 HEREDITARY SPHEROCYTIC HEMOLYTIC ANEMIA: Chronic | ICD-10-CM

## 2019-01-23 DIAGNOSIS — I10 HYPERTENSION, ESSENTIAL: ICD-10-CM

## 2019-01-23 DIAGNOSIS — Z00.00 ANNUAL PHYSICAL EXAM: Primary | ICD-10-CM

## 2019-01-23 DIAGNOSIS — K76.0 HEPATIC STEATOSIS: ICD-10-CM

## 2019-01-23 PROCEDURE — 3074F PR MOST RECENT SYSTOLIC BLOOD PRESSURE < 130 MM HG: ICD-10-PCS | Mod: CPTII,S$GLB,, | Performed by: FAMILY MEDICINE

## 2019-01-23 PROCEDURE — 99396 PR PREVENTIVE VISIT,EST,40-64: ICD-10-PCS | Mod: S$GLB,,, | Performed by: FAMILY MEDICINE

## 2019-01-23 PROCEDURE — 99999 PR PBB SHADOW E&M-EST. PATIENT-LVL III: ICD-10-PCS | Mod: PBBFAC,,, | Performed by: FAMILY MEDICINE

## 2019-01-23 PROCEDURE — 3074F SYST BP LT 130 MM HG: CPT | Mod: CPTII,S$GLB,, | Performed by: FAMILY MEDICINE

## 2019-01-23 PROCEDURE — 99396 PREV VISIT EST AGE 40-64: CPT | Mod: S$GLB,,, | Performed by: FAMILY MEDICINE

## 2019-01-23 PROCEDURE — 3078F DIAST BP <80 MM HG: CPT | Mod: CPTII,S$GLB,, | Performed by: FAMILY MEDICINE

## 2019-01-23 PROCEDURE — 3078F PR MOST RECENT DIASTOLIC BLOOD PRESSURE < 80 MM HG: ICD-10-PCS | Mod: CPTII,S$GLB,, | Performed by: FAMILY MEDICINE

## 2019-01-23 PROCEDURE — 99999 PR PBB SHADOW E&M-EST. PATIENT-LVL III: CPT | Mod: PBBFAC,,, | Performed by: FAMILY MEDICINE

## 2019-01-23 NOTE — PROGRESS NOTES
Subjective:       Patient ID: Jose Sahni is a 63 y.o. male.    Chief Complaint: Annual Exam    Established patient for an annual wellness check/physical exam and also chronic disease management. Specific complaints - see dictation and please see ROS.  P, S, Fm, Soc Hx's; Meds, allergies reviewed and reconciled.  Health maintenance file reviewed and addressed items due.      Review of Systems   Constitutional: Negative for chills, fatigue, fever and unexpected weight change.   HENT: Negative for congestion and trouble swallowing.    Eyes: Negative for redness and visual disturbance.   Respiratory: Negative for cough, chest tightness and shortness of breath.    Cardiovascular: Negative for chest pain, palpitations and leg swelling.   Gastrointestinal: Negative for abdominal pain and blood in stool.   Genitourinary: Negative for difficulty urinating and hematuria.   Musculoskeletal: Negative for arthralgias, back pain, gait problem, joint swelling, myalgias and neck pain.   Skin: Negative for color change and rash.   Neurological: Negative for tremors, speech difficulty, weakness, numbness and headaches.   Hematological: Negative for adenopathy. Does not bruise/bleed easily.   Psychiatric/Behavioral: Negative for behavioral problems, confusion and sleep disturbance. The patient is not nervous/anxious.        Objective:      Physical Exam   Constitutional: He appears well-developed and well-nourished.   HENT:   Head: Normocephalic.   Right Ear: Tympanic membrane, external ear and ear canal normal.   Left Ear: Tympanic membrane, external ear and ear canal normal.   Mouth/Throat: Oropharynx is clear and moist.   Eyes: Pupils are equal, round, and reactive to light.   Neck: Normal range of motion. Neck supple. Carotid bruit is not present. No thyromegaly present.   Cardiovascular: Normal rate, regular rhythm, normal heart sounds and intact distal pulses. Exam reveals no gallop and no friction rub.   No murmur  heard.  Pulmonary/Chest: Effort normal and breath sounds normal.   Abdominal: Soft. He exhibits no distension and no mass. There is no tenderness. There is no rebound and no guarding.   Musculoskeletal: Normal range of motion. He exhibits no edema or tenderness.   Lymphadenopathy:     He has no cervical adenopathy.   Neurological: He is alert. He has normal strength. He displays normal reflexes. No cranial nerve deficit or sensory deficit. Coordination and gait normal.   Skin: Skin is warm and dry.   Psychiatric: He has a normal mood and affect. His behavior is normal. Judgment and thought content normal.   Nursing note and vitals reviewed.      Assessment:       1. Annual physical exam    2. Hypertension, essential    3. History of hepatitis C,s/p successful Rx w/ SVR24 (cure) - 6/2018    4. Hereditary spherocytic hemolytic anemia    5. Bicuspid aortic valve    6. Hepatic steatosis    7. Prostate cancer screening        Plan:   Jose was seen today for annual exam.    Diagnoses and all orders for this visit:    Annual physical exam  -     Comprehensive metabolic panel; Standing  -     Lipid panel; Standing  -     PSA, Screening; Standing    Hypertension, essential  -     Comprehensive metabolic panel; Standing    History of hepatitis C,s/p successful Rx w/ SVR24 (cure) - 6/2018    Hereditary spherocytic hemolytic anemia    Bicuspid aortic valve    Hepatic steatosis    Prostate cancer screening  -     PSA, Screening; Standing      See meds, orders, follow up, routing and instructions sections of encounter.  A 63-year-old patient for annual physical examination.  He has no acute   complaints.  He has had treatment for hepatitis C.  He does keep followups with   the Hepatology Service.  He had some back pain this summer.  He was seen in the   Emergency Room.  I did review their laboratory.  He joined [facility] [employer] going several times a week.    RECOMMENDATIONS:  Continue current management.  Labs in six  months would be his   approximate due date.  Continue exercise.  He will follow up with me in   approximately one year.      TAYLOR/HN  dd: 01/23/2019 13:44:54 (CST)  td: 01/24/2019 05:22:24 (CST)  Doc ID   #[id number]  Job ID #[id number]    CC:

## 2019-01-23 NOTE — PATIENT INSTRUCTIONS
See standing lab orders and please draw CMP, Lipids and PSA - in 6 months      Information about cholesterol, high blood pressure and healthy diet and activity recommendations can be found at the following links on the Internet:    http://www.nhlbi.nih.gov/health/health-topics/topics/hbc  http://www.nhlbi.nih.gov/health/educational/lose_wt/index.htm  Http://www.nhlbi.nih.gov/files/docs/public/heart/hbp_low.pdf  http://www.heart.org/HEARTORG/  http://diabetes.org/  https://www.cdc.gov/  Https://healthfinder.gov/

## 2019-02-21 ENCOUNTER — OFFICE VISIT (OUTPATIENT)
Dept: DERMATOLOGY | Facility: CLINIC | Age: 64
End: 2019-02-21
Payer: COMMERCIAL

## 2019-02-21 DIAGNOSIS — D22.9 MULTIPLE BENIGN NEVI: ICD-10-CM

## 2019-02-21 DIAGNOSIS — D48.5 NEOPLASM OF UNCERTAIN BEHAVIOR OF SKIN: Primary | ICD-10-CM

## 2019-02-21 DIAGNOSIS — L57.0 AK (ACTINIC KERATOSIS): ICD-10-CM

## 2019-02-21 DIAGNOSIS — L81.4 SOLAR LENTIGO: ICD-10-CM

## 2019-02-21 PROCEDURE — 99213 PR OFFICE/OUTPT VISIT, EST, LEVL III, 20-29 MIN: ICD-10-PCS | Mod: 25,S$GLB,, | Performed by: PATHOLOGY

## 2019-02-21 PROCEDURE — 88305 TISSUE EXAM BY PATHOLOGIST: CPT | Mod: 26,,, | Performed by: PATHOLOGY

## 2019-02-21 PROCEDURE — 11102 TANGNTL BX SKIN SINGLE LES: CPT | Mod: S$GLB,,, | Performed by: PATHOLOGY

## 2019-02-21 PROCEDURE — 17000 PR DESTRUCTION(LASER SURGERY,CRYOSURGERY,CHEMOSURGERY),PREMALIGNANT LESIONS,FIRST LESION: ICD-10-PCS | Mod: 59,S$GLB,, | Performed by: PATHOLOGY

## 2019-02-21 PROCEDURE — 88305 TISSUE EXAM BY PATHOLOGIST: CPT | Performed by: PATHOLOGY

## 2019-02-21 PROCEDURE — 11102 PR TANGENTIAL BIOPSY, SKIN, SINGLE LESION: ICD-10-PCS | Mod: S$GLB,,, | Performed by: PATHOLOGY

## 2019-02-21 PROCEDURE — 17000 DESTRUCT PREMALG LESION: CPT | Mod: 59,S$GLB,, | Performed by: PATHOLOGY

## 2019-02-21 PROCEDURE — 99213 OFFICE O/P EST LOW 20 MIN: CPT | Mod: 25,S$GLB,, | Performed by: PATHOLOGY

## 2019-02-21 PROCEDURE — 99999 PR PBB SHADOW E&M-EST. PATIENT-LVL III: ICD-10-PCS | Mod: PBBFAC,,, | Performed by: PATHOLOGY

## 2019-02-21 PROCEDURE — 17003 DESTRUCT PREMALG LES 2-14: CPT | Mod: S$GLB,,, | Performed by: PATHOLOGY

## 2019-02-21 PROCEDURE — 88305 TISSUE SPECIMEN TO PATHOLOGY, DERMATOLOGY: ICD-10-PCS | Mod: 26,,, | Performed by: PATHOLOGY

## 2019-02-21 PROCEDURE — 17003 DESTRUCTION, PREMALIGNANT LESIONS; SECOND THROUGH 14 LESIONS: ICD-10-PCS | Mod: S$GLB,,, | Performed by: PATHOLOGY

## 2019-02-21 PROCEDURE — 99999 PR PBB SHADOW E&M-EST. PATIENT-LVL III: CPT | Mod: PBBFAC,,, | Performed by: PATHOLOGY

## 2019-02-21 NOTE — PROGRESS NOTES
Subjective:       Patient ID:  Jose Sahni is a 64 y.o. male who presents for   Chief Complaint   Patient presents with    Skin Check     Pt here today for f/u on PDT, on arms. Pt also concerned about spot on right side of nose, 1 years, bleeding, Tx. none     HPI  Had PDT to bilateral arms for multiple AKs (ordered by Dr. Simental and done March 2018) with good response.  Has enlarging dark spot to right side of nose, asymptomatic but enlarging and has bled at times.    Review of Systems   Constitutional: Negative for fever, chills, weight loss, weight gain, fatigue, night sweats and malaise.   Skin: Positive for activity-related sunscreen use. Negative for daily sunscreen use.        Objective:    Physical Exam   Constitutional: He appears well-developed and well-nourished. No distress.   Neurological: He is alert and oriented to person, place, and time. He is not disoriented.   Psychiatric: He has a normal mood and affect.   Skin:   Areas Examined (abnormalities noted in diagram):   Scalp / Hair Palpated and Inspected  Head / Face Inspection Performed  Neck Inspection Performed  Chest / Axilla Inspection Performed  Abdomen Inspection Performed  Back Inspection Performed  RUE Inspected  LUE Inspection Performed  Nails and Digits Inspection Performed                   Diagram Legend     Erythematous scaling macule/papule c/w actinic keratosis       Vascular papule c/w angioma      Pigmented verrucoid papule/plaque c/w seborrheic keratosis      Yellow umbilicated papule c/w sebaceous hyperplasia      Irregularly shaped tan macule c/w lentigo     1-2 mm smooth white papules consistent with Milia      Movable subcutaneous cyst with punctum c/w epidermal inclusion cyst      Subcutaneous movable cyst c/w pilar cyst      Firm pink to brown papule c/w dermatofibroma      Pedunculated fleshy papule(s) c/w skin tag(s)      Evenly pigmented macule c/w junctional nevus     Mildly variegated pigmented, slightly  irregular-bordered macule c/w mildly atypical nevus      Flesh colored to evenly pigmented papule c/w intradermal nevus       Pink pearly papule/plaque c/w basal cell carcinoma      Erythematous hyperkeratotic cursted plaque c/w SCC      Surgical scar with no sign of skin cancer recurrence      Open and closed comedones      Inflammatory papules and pustules      Verrucoid papule consistent consistent with wart     Erythematous eczematous patches and plaques     Dystrophic onycholytic nail with subungual debris c/w onychomycosis     Umbilicated papule    Erythematous-base heme-crusted tan verrucoid plaque consistent with inflamed seborrheic keratosis     Erythematous Silvery Scaling Plaque c/w Psoriasis     See annotation      Assessment / Plan:      Pathology Orders:     Normal Orders This Visit    Tissue Specimen To Pathology, Dermatology     Questions:    Directional Terms:  Other(comment)    Right    Clinical Information:  brown to violaceous macule; r/o angioma vs nevus vs lentigo; r/o atypia    Specific Site:  bridge of nose        Neoplasm of uncertain behavior of skin - Shave biopsy procedure note:    Shave biopsy performed after verbal consent including risk of infection, scar, recurrence, need for additional treatment of site. Area prepped with alcohol, anesthetized with approximately 1.0cc of 1% lidocaine with epinephrine. Lesional tissue shaved with razor blade. Hemostasis achieved with application of aluminum chloride followed by hyfrecation. No complications. Dressing applied. Wound care explained.        AK (actinic keratosis) - Cryosurgery Procedure Note    Verbal consent from the patient is obtained including, but not limited to, risk of hypopigmentation/hyperpigmentation, scar, recurrence of lesion. The patient is aware of the precancerous quality and need for treatment of these lesions. Liquid nitrogen cryosurgery is applied to the 14 actinic keratoses, as detailed in the physical exam, to produce  a freeze injury. The patient is aware that blisters may form and is instructed on wound care with gentle cleansing and use of vaseline ointment to keep moist until healed. The patient is supplied a handout on cryosurgery and is instructed to call if lesions do not completely resolve.      Multiple benign nevi - Patient with several benign appearing nevi. Instructed patient to observe lesion(s) for changes and follow up in clinic if changes are noted.       Solar lentigo - This is a benign hyperpigmented sun induced lesion. Daily sun protection will reduce the number of new lesions. Treatment of these benign lesions are considered cosmetic.               Follow-up in about 6 months (around 8/21/2019).

## 2019-03-12 ENCOUNTER — TELEPHONE (OUTPATIENT)
Dept: DERMATOLOGY | Facility: CLINIC | Age: 64
End: 2019-03-12

## 2019-03-12 NOTE — TELEPHONE ENCOUNTER
----- Message from Marci Soares sent at 3/12/2019 10:48 AM CDT -----  Contact: Patients wife  Patient Returning Call from [facility]    Who Left Message for Patient: Thelma    Communication Preference: [phone]

## 2019-03-12 NOTE — TELEPHONE ENCOUNTER
----- Message from Evita Don sent at 3/12/2019  9:38 AM CDT -----  Contact: pts wife   BV - pt Needs Advice    Reason for call: pt is calling to speak with the nurse again pt didn't catch on the phone pt had a biopsy that was taken pt said he didn't understand something that was said on the phone they are making sure and would like to speak with the nurse about it again         Communication Preference: can you please call pts wife at [phone]    Additional Information: none    MIGUEL ÁNGEL

## 2019-03-12 NOTE — TELEPHONE ENCOUNTER
Spoke with pt's wife and let her know that her  is coming in to have the lesion re-excised per dr faria.

## 2019-03-26 ENCOUNTER — PROCEDURE VISIT (OUTPATIENT)
Dept: DERMATOLOGY | Facility: CLINIC | Age: 64
End: 2019-03-26
Payer: COMMERCIAL

## 2019-03-26 DIAGNOSIS — D49.2 VASCULAR NEOPLASM OF SKIN: Primary | ICD-10-CM

## 2019-03-26 PROCEDURE — 99499 NO LOS: ICD-10-PCS | Mod: S$GLB,,, | Performed by: PATHOLOGY

## 2019-03-26 PROCEDURE — 99499 UNLISTED E&M SERVICE: CPT | Mod: S$GLB,,, | Performed by: PATHOLOGY

## 2019-03-26 RX ORDER — LOSARTAN POTASSIUM 100 MG/1
100 TABLET ORAL DAILY
Qty: 180 TABLET | Refills: 1 | Status: SHIPPED | OUTPATIENT
Start: 2019-03-26 | End: 2019-11-13 | Stop reason: SDUPTHER

## 2019-03-26 NOTE — PROGRESS NOTES
Subjective:       Patient ID:  Jose Sahni is a 64 y.o. male who presents for No chief complaint on file.    HPI  Pt here for re-evaluation and possible re-biopsy or re-excision of:  FINAL PATHOLOGIC DIAGNOSIS  GARCIA FINAL DIAGNOSIS:  Skin, right bridge of nose, biopsy (MS [id number], 02/21/2019): Portion of vascular proliferation, favor hemangioma.  Complete excision is recommended if possible as only a small portion lesion appears to been sampled in the  findings in this small biopsy may not be representative of those of the lesion in its entirety.    Review of Systems   Constitutional: Negative for fever and chills.   Skin: Negative for sensitivity to antibiotic ointment, sensitivity to bandage adhesive and tendency to form keloidal scars.        Objective:    Physical Exam   Constitutional: He appears well-developed and well-nourished.   Neurological: He is alert.   Skin:   Areas Examined (abnormalities noted in diagram):   Scalp / Hair Palpated and Inspected              Diagram Legend     Erythematous scaling macule/papule c/w actinic keratosis       Vascular papule c/w angioma      Pigmented verrucoid papule/plaque c/w seborrheic keratosis      Yellow umbilicated papule c/w sebaceous hyperplasia      Irregularly shaped tan macule c/w lentigo     1-2 mm smooth white papules consistent with Milia      Movable subcutaneous cyst with punctum c/w epidermal inclusion cyst      Subcutaneous movable cyst c/w pilar cyst      Firm pink to brown papule c/w dermatofibroma      Pedunculated fleshy papule(s) c/w skin tag(s)      Evenly pigmented macule c/w junctional nevus     Mildly variegated pigmented, slightly irregular-bordered macule c/w mildly atypical nevus      Flesh colored to evenly pigmented papule c/w intradermal nevus       Pink pearly papule/plaque c/w basal cell carcinoma      Erythematous hyperkeratotic cursted plaque c/w SCC      Surgical scar with no sign of skin cancer recurrence      Open and closed  comedones      Inflammatory papules and pustules      Verrucoid papule consistent consistent with wart     Erythematous eczematous patches and plaques     Dystrophic onycholytic nail with subungual debris c/w onychomycosis     Umbilicated papule    Erythematous-base heme-crusted tan verrucoid plaque consistent with inflamed seborrheic keratosis     Erythematous Silvery Scaling Plaque c/w Psoriasis     See annotation      Assessment / Plan:        Vascular neoplasm of skin - no clinical evidence of residual vascular proliferation today; will monitor clinically for evidence of recurrence             No follow-ups on file.

## 2019-10-30 ENCOUNTER — PATIENT OUTREACH (OUTPATIENT)
Dept: ADMINISTRATIVE | Facility: HOSPITAL | Age: 64
End: 2019-10-30

## 2019-11-13 ENCOUNTER — LAB VISIT (OUTPATIENT)
Dept: LAB | Facility: HOSPITAL | Age: 64
End: 2019-11-13
Attending: FAMILY MEDICINE
Payer: COMMERCIAL

## 2019-11-13 ENCOUNTER — OFFICE VISIT (OUTPATIENT)
Dept: INTERNAL MEDICINE | Facility: CLINIC | Age: 64
End: 2019-11-13
Attending: FAMILY MEDICINE
Payer: COMMERCIAL

## 2019-11-13 VITALS
TEMPERATURE: 99 F | BODY MASS INDEX: 25.83 KG/M2 | HEIGHT: 68 IN | HEART RATE: 75 BPM | WEIGHT: 170.44 LBS | OXYGEN SATURATION: 98 % | DIASTOLIC BLOOD PRESSURE: 58 MMHG | SYSTOLIC BLOOD PRESSURE: 132 MMHG

## 2019-11-13 DIAGNOSIS — Q23.1 BICUSPID AORTIC VALVE: ICD-10-CM

## 2019-11-13 DIAGNOSIS — Z00.00 ANNUAL PHYSICAL EXAM: Primary | ICD-10-CM

## 2019-11-13 DIAGNOSIS — Z12.5 PROSTATE CANCER SCREENING: ICD-10-CM

## 2019-11-13 DIAGNOSIS — Z86.19 HISTORY OF HEPATITIS C: ICD-10-CM

## 2019-11-13 DIAGNOSIS — L98.9 SKIN LESION: ICD-10-CM

## 2019-11-13 DIAGNOSIS — I10 HYPERTENSION, ESSENTIAL: ICD-10-CM

## 2019-11-13 DIAGNOSIS — Z00.00 ANNUAL PHYSICAL EXAM: ICD-10-CM

## 2019-11-13 DIAGNOSIS — Q24.9 CONGENITAL HEART ANOMALY: ICD-10-CM

## 2019-11-13 LAB
AFP SERPL-MCNC: 1.2 NG/ML (ref 0–8.4)
ALBUMIN SERPL BCP-MCNC: 4.5 G/DL (ref 3.5–5.2)
ALP SERPL-CCNC: 50 U/L (ref 55–135)
ALT SERPL W/O P-5'-P-CCNC: 18 U/L (ref 10–44)
ANION GAP SERPL CALC-SCNC: 10 MMOL/L (ref 8–16)
AST SERPL-CCNC: 18 U/L (ref 10–40)
BASOPHILS # BLD AUTO: 0.03 K/UL (ref 0–0.2)
BASOPHILS NFR BLD: 0.4 % (ref 0–1.9)
BILIRUB SERPL-MCNC: 1.6 MG/DL (ref 0.1–1)
BUN SERPL-MCNC: 15 MG/DL (ref 8–23)
CALCIUM SERPL-MCNC: 8.8 MG/DL (ref 8.7–10.5)
CHLORIDE SERPL-SCNC: 108 MMOL/L (ref 95–110)
CHOLEST SERPL-MCNC: 129 MG/DL (ref 120–199)
CHOLEST/HDLC SERPL: 3.5 {RATIO} (ref 2–5)
CO2 SERPL-SCNC: 23 MMOL/L (ref 23–29)
COMPLEXED PSA SERPL-MCNC: 1.4 NG/ML (ref 0–4)
CREAT SERPL-MCNC: 1 MG/DL (ref 0.5–1.4)
DIFFERENTIAL METHOD: ABNORMAL
EOSINOPHIL # BLD AUTO: 0.1 K/UL (ref 0–0.5)
EOSINOPHIL NFR BLD: 1 % (ref 0–8)
ERYTHROCYTE [DISTWIDTH] IN BLOOD BY AUTOMATED COUNT: 14 % (ref 11.5–14.5)
EST. GFR  (AFRICAN AMERICAN): >60 ML/MIN/1.73 M^2
EST. GFR  (NON AFRICAN AMERICAN): >60 ML/MIN/1.73 M^2
GLUCOSE SERPL-MCNC: 86 MG/DL (ref 70–110)
HCT VFR BLD AUTO: 32.5 % (ref 40–54)
HDLC SERPL-MCNC: 37 MG/DL (ref 40–75)
HDLC SERPL: 28.7 % (ref 20–50)
HGB BLD-MCNC: 11.6 G/DL (ref 14–18)
LDLC SERPL CALC-MCNC: 54.4 MG/DL (ref 63–159)
LYMPHOCYTES # BLD AUTO: 1.3 K/UL (ref 1–4.8)
LYMPHOCYTES NFR BLD: 18.6 % (ref 18–48)
MCH RBC QN AUTO: 31.5 PG (ref 27–31)
MCHC RBC AUTO-ENTMCNC: 35.7 G/DL (ref 32–36)
MCV RBC AUTO: 88 FL (ref 82–98)
MONOCYTES # BLD AUTO: 0.3 K/UL (ref 0.3–1)
MONOCYTES NFR BLD: 4.8 % (ref 4–15)
NEUTROPHILS # BLD AUTO: 5.2 K/UL (ref 1.8–7.7)
NEUTROPHILS NFR BLD: 75.2 % (ref 38–73)
NONHDLC SERPL-MCNC: 92 MG/DL
PLATELET # BLD AUTO: 203 K/UL (ref 150–350)
PMV BLD AUTO: 9.8 FL (ref 9.2–12.9)
POTASSIUM SERPL-SCNC: 4.1 MMOL/L (ref 3.5–5.1)
PROT SERPL-MCNC: 7.8 G/DL (ref 6–8.4)
RBC # BLD AUTO: 3.68 M/UL (ref 4.6–6.2)
SODIUM SERPL-SCNC: 141 MMOL/L (ref 136–145)
TRIGL SERPL-MCNC: 188 MG/DL (ref 30–150)
WBC # BLD AUTO: 6.93 K/UL (ref 3.9–12.7)

## 2019-11-13 PROCEDURE — 99396 PR PREVENTIVE VISIT,EST,40-64: ICD-10-PCS | Mod: S$GLB,,, | Performed by: FAMILY MEDICINE

## 2019-11-13 PROCEDURE — 3078F PR MOST RECENT DIASTOLIC BLOOD PRESSURE < 80 MM HG: ICD-10-PCS | Mod: CPTII,S$GLB,, | Performed by: FAMILY MEDICINE

## 2019-11-13 PROCEDURE — 80061 LIPID PANEL: CPT

## 2019-11-13 PROCEDURE — 36415 COLL VENOUS BLD VENIPUNCTURE: CPT

## 2019-11-13 PROCEDURE — 87522 HEPATITIS C REVRS TRNSCRPJ: CPT

## 2019-11-13 PROCEDURE — 99999 PR PBB SHADOW E&M-EST. PATIENT-LVL V: ICD-10-PCS | Mod: PBBFAC,,, | Performed by: FAMILY MEDICINE

## 2019-11-13 PROCEDURE — 99999 PR PBB SHADOW E&M-EST. PATIENT-LVL V: CPT | Mod: PBBFAC,,, | Performed by: FAMILY MEDICINE

## 2019-11-13 PROCEDURE — 84153 ASSAY OF PSA TOTAL: CPT

## 2019-11-13 PROCEDURE — 3075F PR MOST RECENT SYSTOLIC BLOOD PRESS GE 130-139MM HG: ICD-10-PCS | Mod: CPTII,S$GLB,, | Performed by: FAMILY MEDICINE

## 2019-11-13 PROCEDURE — 3078F DIAST BP <80 MM HG: CPT | Mod: CPTII,S$GLB,, | Performed by: FAMILY MEDICINE

## 2019-11-13 PROCEDURE — 99396 PREV VISIT EST AGE 40-64: CPT | Mod: S$GLB,,, | Performed by: FAMILY MEDICINE

## 2019-11-13 PROCEDURE — 3075F SYST BP GE 130 - 139MM HG: CPT | Mod: CPTII,S$GLB,, | Performed by: FAMILY MEDICINE

## 2019-11-13 PROCEDURE — 80053 COMPREHEN METABOLIC PANEL: CPT

## 2019-11-13 PROCEDURE — 82105 ALPHA-FETOPROTEIN SERUM: CPT

## 2019-11-13 PROCEDURE — 85025 COMPLETE CBC W/AUTO DIFF WBC: CPT

## 2019-11-13 RX ORDER — LOSARTAN POTASSIUM 100 MG/1
100 TABLET ORAL DAILY
Qty: 90 TABLET | Refills: 3 | Status: SHIPPED | OUTPATIENT
Start: 2019-11-13 | End: 2020-11-18 | Stop reason: SDUPTHER

## 2019-11-13 RX ORDER — SILDENAFIL 100 MG/1
100 TABLET, FILM COATED ORAL DAILY PRN
Qty: 30 TABLET | Refills: 2 | Status: SHIPPED | OUTPATIENT
Start: 2019-11-13

## 2019-11-13 NOTE — PROGRESS NOTES
Subjective:       Patient ID: Jose Sahni is a 64 y.o. male.    Chief Complaint: Annual Exam    Established patient for an annual wellness check/physical exam and also chronic disease management. Specific complaints - see dictation and please see ROS.  P, S, Fm, Soc Hx's; Meds, allergies reviewed and reconciled.  Health maintenance file reviewed and addressed items due.    No particular new complaints today.  Last encounter with hepatology was quite awhile ago.  He had some labs last year.  His provider moved to a different clinic location.  No evidence in his history of significant hepatic dysfunction or deterioration.    Review of Systems   Constitutional: Negative for chills, fatigue, fever and unexpected weight change.   HENT: Negative for congestion and trouble swallowing.    Eyes: Negative for redness and visual disturbance.   Respiratory: Negative for cough, chest tightness and shortness of breath.    Cardiovascular: Negative for chest pain, palpitations and leg swelling.   Gastrointestinal: Negative for abdominal pain and blood in stool.   Genitourinary: Negative for difficulty urinating and hematuria.   Musculoskeletal: Negative for arthralgias, back pain, gait problem, joint swelling, myalgias and neck pain.   Skin: Negative for color change and rash.   Neurological: Negative for tremors, speech difficulty, weakness, numbness and headaches.   Hematological: Negative for adenopathy. Does not bruise/bleed easily.   Psychiatric/Behavioral: Negative for behavioral problems, confusion and sleep disturbance. The patient is not nervous/anxious.        Objective:      Physical Exam   Constitutional: He appears well-developed and well-nourished.   HENT:   Head: Normocephalic.   Right Ear: Tympanic membrane, external ear and ear canal normal.   Left Ear: Tympanic membrane, external ear and ear canal normal.   Mouth/Throat: Oropharynx is clear and moist.   Eyes: Pupils are equal, round, and reactive to light.    Neck: Normal range of motion. Neck supple. Carotid bruit is not present. No thyromegaly present.   Cardiovascular: Normal rate, regular rhythm, normal heart sounds and intact distal pulses. Exam reveals no gallop and no friction rub.   No murmur heard.  Pulmonary/Chest: Effort normal and breath sounds normal.   Abdominal: Soft. He exhibits no distension and no mass. There is no tenderness. There is no rebound and no guarding.   Musculoskeletal: Normal range of motion. He exhibits no edema or tenderness.   Lymphadenopathy:     He has no cervical adenopathy.   Neurological: He is alert. He has normal strength. He displays normal reflexes. No cranial nerve deficit or sensory deficit. Coordination and gait normal.   Skin: Skin is warm and dry.   Psychiatric: He has a normal mood and affect. His behavior is normal. Judgment and thought content normal.   Nursing note and vitals reviewed.      Assessment:       1. Annual physical exam    2. History of hepatitis C,s/p successful Rx w/ SVR24 (cure) - 6/2018    3. Hypertension, essential    4. Bicuspid aortic valve    5. Skin lesion    6. Congenital heart anomaly        Plan:     Medication List with Changes/Refills   New Medications    SILDENAFIL (VIAGRA) 100 MG TABLET    Take 1 tablet (100 mg total) by mouth daily as needed for Erectile Dysfunction.   Current Medications    FOLIC ACID (FOLVITE) 1 MG TABLET    Take 1 mg by mouth once daily.   Changed and/or Refilled Medications    Modified Medication Previous Medication    LOSARTAN (COZAAR) 100 MG TABLET losartan (COZAAR) 100 MG tablet       Take 1 tablet (100 mg total) by mouth once daily.    TAKE 1 TABLET (100 MG TOTAL) BY MOUTH ONCE DAILY.   Discontinued Medications    HYDROCORTISONE 1 % CREAM    Apply topically 3 (three) times daily.    SILDENAFIL (VIAGRA) 100 MG TABLET    Take 1 tablet (100 mg total) by mouth daily as needed for Erectile Dysfunction.     Jose was seen today for annual exam.    Diagnoses and all  orders for this visit:    Annual physical exam  -     CBC auto differential; Future    History of hepatitis C,s/p successful Rx w/ SVR24 (cure) - 6/2018  -     Hepatitis C RNA, quantitative, PCR; Future  -     AFP tumor marker; Future  -     Ambulatory Referral to Hepatology  -     CBC auto differential; Future    Hypertension, essential    Bicuspid aortic valve  Comments:  due for Dr. Paredes in APril 2020    Skin lesion  -     Ambulatory referral to Dermatology    Congenital heart anomaly  Comments:  surgery 1963    Other orders  -     sildenafil (VIAGRA) 100 MG tablet; Take 1 tablet (100 mg total) by mouth daily as needed for Erectile Dysfunction.  -     losartan (COZAAR) 100 MG tablet; Take 1 tablet (100 mg total) by mouth once daily.      See meds, orders, follow up, routing and instructions sections of encounter and AVS. Discussed with patient and provided on AVS.

## 2019-11-13 NOTE — PATIENT INSTRUCTIONS
See standing or future lab orders and please draw Hep C PCR, CBC, CMP, Lipids and PSA - today, thank you.    Information about cholesterol, high blood pressure and healthy diet and activity recommendations can be found at the following links on the Internet:    http://www.nhlbi.nih.gov/health/health-topics/topics/hbc  http://www.nhlbi.nih.gov/health/educational/lose_wt/index.htm  Http://www.nhlbi.nih.gov/files/docs/public/heart/hbp_low.pdf  http://www.heart.org/HEARTORG/  http://diabetes.org/  https://www.cdc.gov/  Https://healthfinder.gov/  https://health.gov/dietaryguidelines/2015/guidelines/  https://health.gov/paguidelines/second-edition/pdf/Physical_Activity_Guidelines_2nd_edition.pdf

## 2019-11-14 ENCOUNTER — DOCUMENTATION ONLY (OUTPATIENT)
Dept: TRANSPLANT | Facility: CLINIC | Age: 64
End: 2019-11-14

## 2019-11-14 NOTE — LETTER
November 14, 2019    Jose Sahni  [address] [place] [id number]      Dear Jose Sahni:    Your doctor has referred you to the [facility]. We are sending this letter to remind you to make an appointment with us to complete the referral process.     Please call us at [phone] to schedule an appointment. We look forward to seeing you soon.     If you received a call and have been scheduled, please disregard this letter.       Sincerely,        [facility]   [facility] [place], [place] [id number] [phone]

## 2019-11-14 NOTE — NURSING
Pt records reviewed.   Pt will be referred to Hepatitis C clinic  History of hepatitis C  Initial referral received  from the [facility]que.   Referring Provider/diagnosis  Doug Cordero MD    Referral letter sent to  patient.

## 2019-11-15 ENCOUNTER — TELEPHONE (OUTPATIENT)
Dept: HEPATOLOGY | Facility: CLINIC | Age: 64
End: 2019-11-15

## 2019-11-15 LAB
HCV RNA SERPL NAA+PROBE-LOG IU: <1.08 LOG (10) IU/ML
HCV RNA SERPL QL NAA+PROBE: NOT DETECTED IU/ML
HCV RNA SPEC NAA+PROBE-ACNC: <12 IU/ML

## 2019-11-15 NOTE — TELEPHONE ENCOUNTER
----- Message from Jackie Ramirez MA sent at 11/13/2019  2:32 PM CST -----      ----- Message -----  From: Lashaun Inman  Sent: 11/13/2019   1:58 PM CST  To: [facility] Hepatitis C Staff    Pt is needing a follow up apt with Hepatology.  Please call to schedule

## 2019-11-15 NOTE — TELEPHONE ENCOUNTER
Tried calling patient to schedule on 11/14 and had to leave a message and on 11/15. I will mail a letter letting the patient know we are trying to schedule.

## 2019-11-18 ENCOUNTER — TELEPHONE (OUTPATIENT)
Dept: HEPATOLOGY | Facility: CLINIC | Age: 64
End: 2019-11-18

## 2019-11-18 NOTE — TELEPHONE ENCOUNTER
Chart reviewed  1. Hx of HCV - previously seen / treated by Katraina Shaikh PA-C  Epclusa x 12 weeks w/ SVR24 documented 6/2018  Pt's HCV has been cured. He does NOT need f/u in HCV clinic.    2. Pre treatment liver staging:  - 4/2014 liver biopsy revealed grade 1 stage 1 fibrosis  - 8/2017 Fibroscan - 7.3kPa = F2,  = S1  No evidence of advanced fibrosis    3. 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 HCV is cured. No further liver f/u required.   2.) I see general hepatology also tried contacting him. He does not need to return that call for an appt.    I will notify Dr Cordero.

## 2019-11-18 NOTE — TELEPHONE ENCOUNTER
Attempt made to reach patient.  LVM informing him that he did not need to f/u with us in the hepatology department.

## 2019-11-18 NOTE — TELEPHONE ENCOUNTER
----- Message from Jennifer Mix RN sent at 11/14/2019  1:02 PM CST -----  Labiche patient who was treated and had SVR 24 done on 6/5/18.  1 yr quant never done.  Patient had quant drawn on 11/13/19 under another provider's name; result pending.  ----- Message -----  From: Elissa Jose LPN  Sent: 11/14/2019  11:10 AM CST  To: Jennifer Mix RN    Patient has a referral in for an appointment but looks like the patient just needs to have labs done.    Dara

## 2019-12-05 ENCOUNTER — TELEPHONE (OUTPATIENT)
Dept: INTERNAL MEDICINE | Facility: CLINIC | Age: 64
End: 2019-12-05

## 2019-12-05 DIAGNOSIS — D58.0 HEREDITARY SPHEROCYTIC HEMOLYTIC ANEMIA: Primary | Chronic | ICD-10-CM

## 2019-12-05 DIAGNOSIS — D59.8 OTHER ACQUIRED HEMOLYTIC ANEMIAS: ICD-10-CM

## 2019-12-06 NOTE — TELEPHONE ENCOUNTER
Called patient and discussed labs and or test results. Patient expressed understanding and had the opportunity to ask questions. Any questions were answered. See meds, orders, follow up and instructions sections of encounter.    Specific issues include:  I have anemia but is blood count this time was a little bit lower.  Last saw Dr. llamas in 2013.  Suggested following up in Hematology for a routine follow-up.

## 2019-12-09 ENCOUNTER — TELEPHONE (OUTPATIENT)
Dept: HEMATOLOGY/ONCOLOGY | Facility: CLINIC | Age: 64
End: 2019-12-09

## 2019-12-12 NOTE — TELEPHONE ENCOUNTER
Good Morning, a message has been sent to Hematology they will call and schedule with the patient      Thank you

## 2019-12-18 ENCOUNTER — LAB VISIT (OUTPATIENT)
Dept: LAB | Facility: HOSPITAL | Age: 64
End: 2019-12-18
Payer: COMMERCIAL

## 2019-12-18 ENCOUNTER — INITIAL CONSULT (OUTPATIENT)
Dept: HEMATOLOGY/ONCOLOGY | Facility: CLINIC | Age: 64
End: 2019-12-18
Attending: FAMILY MEDICINE
Payer: COMMERCIAL

## 2019-12-18 VITALS
OXYGEN SATURATION: 100 % | RESPIRATION RATE: 16 BRPM | WEIGHT: 171.31 LBS | SYSTOLIC BLOOD PRESSURE: 146 MMHG | TEMPERATURE: 98 F | BODY MASS INDEX: 25.96 KG/M2 | HEIGHT: 68 IN | HEART RATE: 81 BPM | DIASTOLIC BLOOD PRESSURE: 70 MMHG

## 2019-12-18 DIAGNOSIS — D58.0 HEREDITARY SPHEROCYTIC HEMOLYTIC ANEMIA: Chronic | ICD-10-CM

## 2019-12-18 DIAGNOSIS — R16.1 SPLENOMEGALY: ICD-10-CM

## 2019-12-18 DIAGNOSIS — D64.9 ANEMIA OF UNKNOWN ETIOLOGY: ICD-10-CM

## 2019-12-18 DIAGNOSIS — R16.1 SPLENOMEGALY: Primary | ICD-10-CM

## 2019-12-18 DIAGNOSIS — Q24.9 CONGENITAL HEART ANOMALY: ICD-10-CM

## 2019-12-18 DIAGNOSIS — D59.8 OTHER ACQUIRED HEMOLYTIC ANEMIAS: ICD-10-CM

## 2019-12-18 LAB
BASOPHILS # BLD AUTO: 0.04 K/UL (ref 0–0.2)
BASOPHILS NFR BLD: 0.5 % (ref 0–1.9)
BILIRUB UR QL STRIP: NEGATIVE
CLARITY UR REFRACT.AUTO: CLEAR
COLOR UR AUTO: YELLOW
DIFFERENTIAL METHOD: ABNORMAL
EOSINOPHIL # BLD AUTO: 0.1 K/UL (ref 0–0.5)
EOSINOPHIL NFR BLD: 1.1 % (ref 0–8)
ERYTHROCYTE [DISTWIDTH] IN BLOOD BY AUTOMATED COUNT: 13.6 % (ref 11.5–14.5)
FERRITIN SERPL-MCNC: 385 NG/ML (ref 20–300)
GLUCOSE UR QL STRIP: NEGATIVE
HAPTOGLOB SERPL-MCNC: 20 MG/DL (ref 30–250)
HCT VFR BLD AUTO: 38 % (ref 40–54)
HGB BLD-MCNC: 13.1 G/DL (ref 14–18)
HGB UR QL STRIP: NEGATIVE
IMM GRANULOCYTES # BLD AUTO: 0.06 K/UL (ref 0–0.04)
IMM GRANULOCYTES NFR BLD AUTO: 0.8 % (ref 0–0.5)
IRON SERPL-MCNC: 79 UG/DL (ref 45–160)
KETONES UR QL STRIP: NEGATIVE
LDH SERPL L TO P-CCNC: 123 U/L (ref 110–260)
LEUKOCYTE ESTERASE UR QL STRIP: NEGATIVE
LYMPHOCYTES # BLD AUTO: 1.7 K/UL (ref 1–4.8)
LYMPHOCYTES NFR BLD: 21.9 % (ref 18–48)
MCH RBC QN AUTO: 31.7 PG (ref 27–31)
MCHC RBC AUTO-ENTMCNC: 34.5 G/DL (ref 32–36)
MCV RBC AUTO: 92 FL (ref 82–98)
MONOCYTES # BLD AUTO: 0.5 K/UL (ref 0.3–1)
MONOCYTES NFR BLD: 6.4 % (ref 4–15)
NEUTROPHILS # BLD AUTO: 5.2 K/UL (ref 1.8–7.7)
NEUTROPHILS NFR BLD: 69.3 % (ref 38–73)
NITRITE UR QL STRIP: NEGATIVE
NRBC BLD-RTO: 0 /100 WBC
PH UR STRIP: 6 [PH] (ref 5–8)
PLATELET # BLD AUTO: 181 K/UL (ref 150–350)
PMV BLD AUTO: 10.1 FL (ref 9.2–12.9)
PROT UR QL STRIP: NEGATIVE
RBC # BLD AUTO: 4.13 M/UL (ref 4.6–6.2)
RETICS/RBC NFR AUTO: 3.4 % (ref 0.4–2)
SATURATED IRON: 24 % (ref 20–50)
SP GR UR STRIP: 1.02 (ref 1–1.03)
TOTAL IRON BINDING CAPACITY: 332 UG/DL (ref 250–450)
TRANSFERRIN SERPL-MCNC: 224 MG/DL (ref 200–375)
URN SPEC COLLECT METH UR: NORMAL
VIT B12 SERPL-MCNC: 419 PG/ML (ref 210–950)
WBC # BLD AUTO: 7.52 K/UL (ref 3.9–12.7)

## 2019-12-18 PROCEDURE — 84165 PATHOLOGIST INTERPRETATION SPE: ICD-10-PCS | Mod: 26,,, | Performed by: PATHOLOGY

## 2019-12-18 PROCEDURE — 85025 COMPLETE CBC W/AUTO DIFF WBC: CPT

## 2019-12-18 PROCEDURE — 83010 ASSAY OF HAPTOGLOBIN QUANT: CPT

## 2019-12-18 PROCEDURE — 86334 PATHOLOGIST INTERPRETATION IFE: ICD-10-PCS | Mod: 26,,, | Performed by: PATHOLOGY

## 2019-12-18 PROCEDURE — 83540 ASSAY OF IRON: CPT

## 2019-12-18 PROCEDURE — 99999 PR PBB SHADOW E&M-EST. PATIENT-LVL III: CPT | Mod: PBBFAC,,, | Performed by: INTERNAL MEDICINE

## 2019-12-18 PROCEDURE — 82728 ASSAY OF FERRITIN: CPT

## 2019-12-18 PROCEDURE — 85557 RBC OSMOTIC FRAGILITY: CPT

## 2019-12-18 PROCEDURE — 84165 PROTEIN E-PHORESIS SERUM: CPT

## 2019-12-18 PROCEDURE — 3077F PR MOST RECENT SYSTOLIC BLOOD PRESSURE >= 140 MM HG: ICD-10-PCS | Mod: CPTII,S$GLB,, | Performed by: INTERNAL MEDICINE

## 2019-12-18 PROCEDURE — 83615 LACTATE (LD) (LDH) ENZYME: CPT

## 2019-12-18 PROCEDURE — 36415 COLL VENOUS BLD VENIPUNCTURE: CPT

## 2019-12-18 PROCEDURE — 82607 VITAMIN B-12: CPT

## 2019-12-18 PROCEDURE — 3008F BODY MASS INDEX DOCD: CPT | Mod: CPTII,S$GLB,, | Performed by: INTERNAL MEDICINE

## 2019-12-18 PROCEDURE — 99204 PR OFFICE/OUTPT VISIT, NEW, LEVL IV, 45-59 MIN: ICD-10-PCS | Mod: S$GLB,,, | Performed by: INTERNAL MEDICINE

## 2019-12-18 PROCEDURE — 3008F PR BODY MASS INDEX (BMI) DOCUMENTED: ICD-10-PCS | Mod: CPTII,S$GLB,, | Performed by: INTERNAL MEDICINE

## 2019-12-18 PROCEDURE — 99204 OFFICE O/P NEW MOD 45 MIN: CPT | Mod: S$GLB,,, | Performed by: INTERNAL MEDICINE

## 2019-12-18 PROCEDURE — 84165 PROTEIN E-PHORESIS SERUM: CPT | Mod: 26,,, | Performed by: PATHOLOGY

## 2019-12-18 PROCEDURE — 99999 PR PBB SHADOW E&M-EST. PATIENT-LVL III: ICD-10-PCS | Mod: PBBFAC,,, | Performed by: INTERNAL MEDICINE

## 2019-12-18 PROCEDURE — 81003 URINALYSIS AUTO W/O SCOPE: CPT

## 2019-12-18 PROCEDURE — 3078F PR MOST RECENT DIASTOLIC BLOOD PRESSURE < 80 MM HG: ICD-10-PCS | Mod: CPTII,S$GLB,, | Performed by: INTERNAL MEDICINE

## 2019-12-18 PROCEDURE — 3078F DIAST BP <80 MM HG: CPT | Mod: CPTII,S$GLB,, | Performed by: INTERNAL MEDICINE

## 2019-12-18 PROCEDURE — 86157 COLD AGGLUTININ TITER: CPT

## 2019-12-18 PROCEDURE — 86334 IMMUNOFIX E-PHORESIS SERUM: CPT | Mod: 26,,, | Performed by: PATHOLOGY

## 2019-12-18 PROCEDURE — 3077F SYST BP >= 140 MM HG: CPT | Mod: CPTII,S$GLB,, | Performed by: INTERNAL MEDICINE

## 2019-12-18 PROCEDURE — 86334 IMMUNOFIX E-PHORESIS SERUM: CPT

## 2019-12-18 PROCEDURE — 85045 AUTOMATED RETICULOCYTE COUNT: CPT

## 2019-12-18 NOTE — PROGRESS NOTES
CC: Chronic anemia, follow up      HPI: Mr. Sahni, 64, is here for a follow up visit for anemia. He has chronic hepatitis C, chronic anemia, gall stones, HTn.  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 He was last seen here by Dr.Archie Sahni in 2013. His CBC and other labs ( bilirubin, retic count) at that time suggested that he had hemolytic anemia or compensated hemolysis.  CBC from 11/2012 showed hemoglobin of 13.9 and  MCV was 89.  His indirect bilirubin has been elevated.  He had numerous gallstones and he has splenomegaly.  Hemoglobin electrophoresis was normal. G6PD was normal. JUDD was negative. Haptoglobin was low. Osmotic fragility was ordered, but was not done.   There is no family h/o anemia/ other hematologic condition.      Interval History: Denies yellowing of skin/eyes. No abdominal pain, fatigue, weight loss or change of appetite. No h/o discolored urine.       Review of Systems   Constitutional: Negative.    HENT: Negative.    Eyes: Negative for blurred vision, double vision, photophobia and pain.   Respiratory: Negative for cough and sputum production.    Cardiovascular: Negative for chest pain, palpitations, orthopnea and claudication.   Gastrointestinal: Negative for abdominal pain, blood in stool, constipation, diarrhea, melena, nausea and vomiting.   Genitourinary: Negative for flank pain, frequency, hematuria and urgency.   Musculoskeletal: Negative for back pain and myalgias.   Skin: Negative.    Neurological: Negative.  Negative for dizziness, tingling, tremors, focal weakness, seizures and weakness.   Psychiatric/Behavioral: Negative for depression, substance abuse and suicidal ideas.       Past Medical History:   Diagnosis Date    Allergy     Asymptomatic cholelithiasis     Heart murmur     History of hepatitis C,s/p successful Rx w/ SVR24 (cure) - 6/2018 10/18/2016    GT2, tx naive  2014 liver biopsy, grade 1 stage 1 9-2017 Fibroscan = F2   Completed Epclusa 12wks w/ SVR     Hypertension     Tuberculosis     Hx postive         Past Surgical History:   Procedure Laterality Date    CARDIAC SURGERY  1962    HAND SURGERY  2008    Left hand    HERNIA REPAIR  1967    orchiopexy, left  age 12       Family History   Problem Relation Age of Onset    Stroke Mother     Heart disease Father     Cancer Paternal Aunt     Cancer Paternal Uncle     Melanoma Neg Hx          Social History     Socioeconomic History    Marital status:      Spouse name: Not on file    Number of children: Not on file    Years of education: Not on file    Highest education level: Not on file   Occupational History    Not on file   Tobacco Use    Smoking status: Never Smoker    Smokeless tobacco: Never Used   Substance and Sexual Activity    Alcohol use: No     Alcohol/week: 7.0 standard drinks     Types: 7 Cans of beer per week    Drug use: No    Sexual activity: Yes     Partners: Female         Review of patient's allergies indicates:   Allergen Reactions    Benazepril hcl Other (See Comments)     Coughing         Current Outpatient Medications   Medication Sig    folic acid (FOLVITE) 1 MG tablet Take 1 mg by mouth once daily.    losartan (COZAAR) 100 MG tablet Take 1 tablet (100 mg total) by mouth once daily.    sildenafil (VIAGRA) 100 MG tablet Take 1 tablet (100 mg total) by mouth daily as needed for Erectile Dysfunction.     No current facility-administered medications for this visit.          Vitals:    12/18/19 1002   BP: (!) 146/70   Pulse: 81   Resp: 16   Temp: 98.3 °F (36.8 °C)         Physical Exam   Constitutional: He is oriented to person, place, and time. He appears well-developed. No distress.   Eyes: No scleral icterus.   Cardiovascular: Normal rate and regular rhythm.   Murmur heard.  Pulmonary/Chest: Effort normal. No respiratory distress. He has no wheezes. He has no rales.   Abdominal: Soft. He exhibits no distension and no mass. There is no tenderness.   Spleen not  palpable today   Musculoskeletal: He exhibits no edema.   Lymphadenopathy:     He has no cervical adenopathy.   Neurological: He is alert and oriented to person, place, and time.   Skin: Skin is warm.   Psychiatric: He has a normal mood and affect.       Component      Latest Ref Rng & Units 9/12/2013   Fragility-Fresh       Test Not Performed   Osmotic Frag 0.5 g/dL NaCl       Test Not Performed   Osmotic Frag 0.6 g/dL NaCl       Test Not Performed   Osmotic Frag 0.65 g/dL NaCl       Test Not Performed   Osmotic Frag 0.75 g/dL NaCl       Test Not Performed   Osmotic Frag Comment       Test Not Performed   Percent PNH Cells      0.000 - 0.004 % 0.000   PNH Interpretation       See note   Vitamin B-12      210 - 950 pg/mL 409   Ferritin      20.0 - 300.0 ng/mL 423 (H)   RBC Folate      280 - 791 ng/mL 648        Ref Range & Units 6yr ago   Hgb A2 Quant 1.5 - 3.8 % 3.1    Hemoglobin Bands  Hb A and Hb A2    Hemoglobin Electrophoresis Interp  Normal      Component      Latest Ref Rng & Units 9/3/2013   Direct Lety (JUDD)       NEG   G6PD Quant      7.0 - 20.5 U/G Hg 13.9   Haptoglobin      30 - 250 mg/dL <10 (L)       Assessment:    1. Chronic anemia  2. Gallstones  3. H/o hepatitis C  4. Essential HTN      Plan:    1. He had multiple gall stones, low haptoglobin, mild conjugated+ unconjugated hyperbilirubinemia, splenomegaly, reticulocytosis in 2013--all suggestive for a hemolytic process. He has never been transfused. No clear family h/o anemia. He denies pruritus, discolored urine, abdominal pain, fatigue now. He had normal hemoglobin electrophoresis in 2013. PNH screen was normal. JUDD was negative. LDH was normal. Haptoglobin was low. G6PD was normal.   B12, folate were normal.   His most recent hemoglobin in Nov 2019 was 11.6 with normocytic picture.  He will have repeat LDH, retic count, cold agglutinin screen, osmotic fragility test done today, smear reviewed. He will continue folic acid daily.          3. He  has been treated successfully.     4. On Losartan, follows with .

## 2019-12-18 NOTE — LETTER
December 18, 2019      Doug Cordero MD  [address]  [facility] [address]           [facility]  [address]  [facility] [address]  Phone: [phone]          Patient: Jose Sahni   MR Number: [id number]   YOB: 1955   Date of Visit: 12/18/2019       Dear Dr. Doug Cordero:    Thank you for referring Jose Sahni to me for evaluation. Attached you will find relevant portions of my assessment and plan of care.    If you have questions, please do not hesitate to call me. I look forward to following Jose Sahni along with you.    Sincerely,    Royal Barnes MD    Enclosure  CC:  No Recipients    If you would like to receive this communication electronically, please contact [email or web address] or [phone] to request more information on [employer] Link access.    For providers and/or their staff who would like to refer a patient to [facility], please contact us through our one-stop-shop provider referral line, [facility], at [phone].    If you feel you have received this communication in error or would no longer like to receive these types of communications, please e-mail [email or web address]

## 2019-12-18 NOTE — Clinical Note
Cbc, ldh, retic, osmotic fragility, cold agglutinin,spep, immunofixation, iron, tibc, ferritin, b12 ,haptoglobin, urine analysis today

## 2019-12-19 LAB
ALBUMIN SERPL ELPH-MCNC: 4.66 G/DL (ref 3.35–5.55)
ALPHA1 GLOB SERPL ELPH-MCNC: 0.28 G/DL (ref 0.17–0.41)
ALPHA2 GLOB SERPL ELPH-MCNC: 0.58 G/DL (ref 0.43–0.99)
B-GLOBULIN SERPL ELPH-MCNC: 0.53 G/DL (ref 0.5–1.1)
CA TITR SERPL: NORMAL TITER
GAMMA GLOB SERPL ELPH-MCNC: 1.55 G/DL (ref 0.67–1.58)
INTERPRETATION SERPL IFE-IMP: NORMAL
PATHOLOGIST INTERPRETATION IFE: NORMAL
PATHOLOGIST INTERPRETATION SPE: NORMAL
PROT SERPL-MCNC: 7.6 G/DL (ref 6–8.4)

## 2019-12-23 LAB
OF .50% NACL NFR RBC: 74 %HEMOL (ref 3–53)
OF .60% NACL NFR RBC: 86 %HEMOL (ref 14–74)
OF .65% NACL NFR RBC: 77 %HEMOL (ref 4–40)
OF .75% NACL NFR RBC: 38 %HEMOL (ref 1–11)
OF RBC-IMP: ABNORMAL
OSMOTIC FRAG COMMENT: ABNORMAL
SEX OF CONTROL VIAL: ABNORMAL

## 2020-01-08 ENCOUNTER — PATIENT OUTREACH (OUTPATIENT)
Dept: ADMINISTRATIVE | Facility: OTHER | Age: 65
End: 2020-01-08

## 2020-01-09 ENCOUNTER — OFFICE VISIT (OUTPATIENT)
Dept: DERMATOLOGY | Facility: CLINIC | Age: 65
End: 2020-01-09
Attending: FAMILY MEDICINE
Payer: COMMERCIAL

## 2020-01-09 DIAGNOSIS — L40.8 SEBOPSORIASIS: Primary | ICD-10-CM

## 2020-01-09 PROCEDURE — 99999 PR PBB SHADOW E&M-EST. PATIENT-LVL II: CPT | Mod: PBBFAC,,, | Performed by: DERMATOLOGY

## 2020-01-09 PROCEDURE — 99214 PR OFFICE/OUTPT VISIT, EST, LEVL IV, 30-39 MIN: ICD-10-PCS | Mod: 25,S$GLB,, | Performed by: DERMATOLOGY

## 2020-01-09 PROCEDURE — 99999 PR PBB SHADOW E&M-EST. PATIENT-LVL II: ICD-10-PCS | Mod: PBBFAC,,, | Performed by: DERMATOLOGY

## 2020-01-09 PROCEDURE — 11900 PR INJECTION INTO SKIN LESIONS, UP TO 7: ICD-10-PCS | Mod: S$GLB,,, | Performed by: DERMATOLOGY

## 2020-01-09 PROCEDURE — 11900 INJECT SKIN LESIONS </W 7: CPT | Mod: S$GLB,,, | Performed by: DERMATOLOGY

## 2020-01-09 PROCEDURE — 99214 OFFICE O/P EST MOD 30 MIN: CPT | Mod: 25,S$GLB,, | Performed by: DERMATOLOGY

## 2020-01-09 RX ORDER — BETAMETHASONE DIPROPIONATE 0.5 MG/G
CREAM TOPICAL
Qty: 60 G | Refills: 3 | Status: SHIPPED | OUTPATIENT
Start: 2020-01-09

## 2020-01-09 NOTE — LETTER
January 9, 2020      Doug Cordero MD  [address]  [place] [address]           [facility]  [address]  [place] [address]  Phone: [phone]  Fax: [phone]          Patient: Jose Sahni   MR Number: [id number]   YOB: 1955   Date of Visit: 1/9/2020       Dear Dr. Doug Cordero:    Thank you for referring Jose Sahni to me for evaluation. Attached you will find relevant portions of my assessment and plan of care.    If you have questions, please do not hesitate to call me. I look forward to following Jose Sahni along with you.    Sincerely,    Mckenna Warren MD    Enclosure  CC:  No Recipients    If you would like to receive this communication electronically, please contact [email or web address] or [phone] to request more information on [employer] Link access.    For providers and/or their staff who would like to refer a patient to [facility], please contact us through our one-stop-shop provider referral line, [facility], at [phone].    If you feel you have received this communication in error or would no longer like to receive these types of communications, please e-mail [email or web address]

## 2020-01-09 NOTE — PROGRESS NOTES
Subjective:       Patient ID:  Jose Sahni is a 64 y.o. male who presents for   Chief Complaint   Patient presents with    Skin Check     Patient complains of lesion(s)  Location: rash post left scalp  Duration: 3 months  Symptoms: itchy and scaling  Relieving factors/Previous treatments: none    Also c/o itchy scaling ears x years - treated with otc hc --did not help        Review of Systems   Skin: Positive for itching and rash.   Allergic/Immunologic: Negative for environmental allergies.        Objective:    Physical Exam   Constitutional: He appears well-developed and well-nourished. No distress.   Neurological: He is alert and oriented to person, place, and time. He is not disoriented.   Psychiatric: He has a normal mood and affect.   Skin:   Areas Examined (abnormalities noted in diagram):   Scalp / Hair Palpated and Inspected  Head / Face Inspection Performed  Neck Inspection Performed  Chest / Axilla Inspection Performed              Diagram Legend     Erythematous scaling macule/papule c/w actinic keratosis       Vascular papule c/w angioma      Pigmented verrucoid papule/plaque c/w seborrheic keratosis      Yellow umbilicated papule c/w sebaceous hyperplasia      Irregularly shaped tan macule c/w lentigo     1-2 mm smooth white papules consistent with Milia      Movable subcutaneous cyst with punctum c/w epidermal inclusion cyst      Subcutaneous movable cyst c/w pilar cyst      Firm pink to brown papule c/w dermatofibroma      Pedunculated fleshy papule(s) c/w skin tag(s)      Evenly pigmented macule c/w junctional nevus     Mildly variegated pigmented, slightly irregular-bordered macule c/w mildly atypical nevus      Flesh colored to evenly pigmented papule c/w intradermal nevus       Pink pearly papule/plaque c/w basal cell carcinoma      Erythematous hyperkeratotic cursted plaque c/w SCC      Surgical scar with no sign of skin cancer recurrence      Open and closed comedones      Inflammatory  papules and pustules      Verrucoid papule consistent consistent with wart     Erythematous eczematous patches and plaques     Dystrophic onycholytic nail with subungual debris c/w onychomycosis     Umbilicated papule    Erythematous-base heme-crusted tan verrucoid plaque consistent with inflamed seborrheic keratosis     Erythematous Silvery Scaling Plaque c/w Psoriasis     See annotation      Assessment / Plan:        Sebopsoriasis  -     betamethasone dipropionate (DIPROLENE) 0.05 % cream; AAA ears and scalp bid prn  Dispense: 60 g; Refill: 3    Intralesional Kenalog 10mg/cc (2.0 cc total) injected into 1 lesions on the left post scalp today after obtaining verbal consent including risk of surrounding hypopigmentation. Patient tolerated procedure well.    Units: 1  NDC for Kenalog 10mg/cc:  3745-6153-71    -     triamcinolone acetonide injection 10 mg             Follow up in about 4 weeks (around 2/6/2020).

## 2020-02-14 ENCOUNTER — PATIENT OUTREACH (OUTPATIENT)
Dept: ADMINISTRATIVE | Facility: OTHER | Age: 65
End: 2020-02-14

## 2020-02-17 ENCOUNTER — OFFICE VISIT (OUTPATIENT)
Dept: DERMATOLOGY | Facility: CLINIC | Age: 65
End: 2020-02-17
Payer: COMMERCIAL

## 2020-02-17 DIAGNOSIS — L40.8 SEBOPSORIASIS: ICD-10-CM

## 2020-02-17 DIAGNOSIS — L98.9 DISEASE OF SKIN AND SUBCUTANEOUS TISSUE: Primary | ICD-10-CM

## 2020-02-17 PROCEDURE — 88305 TISSUE EXAM BY PATHOLOGIST: ICD-10-PCS | Mod: 26,,, | Performed by: PATHOLOGY

## 2020-02-17 PROCEDURE — 11104 PR PUNCH BIOPSY, SKIN, SINGLE LESION: ICD-10-PCS | Mod: S$GLB,,, | Performed by: DERMATOLOGY

## 2020-02-17 PROCEDURE — 99213 OFFICE O/P EST LOW 20 MIN: CPT | Mod: 25,S$GLB,, | Performed by: DERMATOLOGY

## 2020-02-17 PROCEDURE — 88312 PR  SPECIAL STAINS,GROUP I: ICD-10-PCS | Mod: 26,,, | Performed by: PATHOLOGY

## 2020-02-17 PROCEDURE — 88312 SPECIAL STAINS GROUP 1: CPT | Performed by: PATHOLOGY

## 2020-02-17 PROCEDURE — 88305 TISSUE EXAM BY PATHOLOGIST: CPT | Mod: 26,,, | Performed by: PATHOLOGY

## 2020-02-17 PROCEDURE — 11104 PUNCH BX SKIN SINGLE LESION: CPT | Mod: S$GLB,,, | Performed by: DERMATOLOGY

## 2020-02-17 PROCEDURE — 88305 TISSUE EXAM BY PATHOLOGIST: CPT | Performed by: PATHOLOGY

## 2020-02-17 PROCEDURE — 99999 PR PBB SHADOW E&M-EST. PATIENT-LVL II: CPT | Mod: PBBFAC,,, | Performed by: DERMATOLOGY

## 2020-02-17 PROCEDURE — 88312 SPECIAL STAINS GROUP 1: CPT | Mod: 26,,, | Performed by: PATHOLOGY

## 2020-02-17 PROCEDURE — 99213 PR OFFICE/OUTPT VISIT, EST, LEVL III, 20-29 MIN: ICD-10-PCS | Mod: 25,S$GLB,, | Performed by: DERMATOLOGY

## 2020-02-17 PROCEDURE — 99999 PR PBB SHADOW E&M-EST. PATIENT-LVL II: ICD-10-PCS | Mod: PBBFAC,,, | Performed by: DERMATOLOGY

## 2020-02-17 NOTE — PROGRESS NOTES
Subjective:       Patient ID:  Jose Sahni is a 65 y.o. male who presents for   Chief Complaint   Patient presents with    Psoriasis     f/up of psoriasis of scalp & ears x mos. My skin is much better. It seem clear. I had a Kenalog inj on 1-9-2020 and I used Dorpolene cr qd - bid to ears.      Psoriasis  - Follow-up  Diagnosis: sebopsoriasis.  Symptom course: resolved  Currently using: last seen 1/9/20 for sebopsoriasis - treated with diprolene cream bid and IL K 10 to left post auricular scalp.  Affected locations: currently clear  Signs / symptoms: asymptomatic        Review of Systems   Skin: Negative for itching and rash.   Allergic/Immunologic: Negative for environmental allergies.        Objective:    Physical Exam   Constitutional: He appears well-developed and well-nourished. No distress.   Neurological: He is alert and oriented to person, place, and time. He is not disoriented.   Psychiatric: He has a normal mood and affect.   Skin:   Areas Examined (abnormalities noted in diagram):   Scalp / Hair Palpated and Inspected  Head / Face Inspection Performed              Diagram Legend     Erythematous scaling macule/papule c/w actinic keratosis       Vascular papule c/w angioma      Pigmented verrucoid papule/plaque c/w seborrheic keratosis      Yellow umbilicated papule c/w sebaceous hyperplasia      Irregularly shaped tan macule c/w lentigo     1-2 mm smooth white papules consistent with Milia      Movable subcutaneous cyst with punctum c/w epidermal inclusion cyst      Subcutaneous movable cyst c/w pilar cyst      Firm pink to brown papule c/w dermatofibroma      Pedunculated fleshy papule(s) c/w skin tag(s)      Evenly pigmented macule c/w junctional nevus     Mildly variegated pigmented, slightly irregular-bordered macule c/w mildly atypical nevus      Flesh colored to evenly pigmented papule c/w intradermal nevus       Pink pearly papule/plaque c/w basal cell carcinoma      Erythematous  hyperkeratotic cursted plaque c/w SCC      Surgical scar with no sign of skin cancer recurrence      Open and closed comedones      Inflammatory papules and pustules      Verrucoid papule consistent consistent with wart     Erythematous eczematous patches and plaques     Dystrophic onycholytic nail with subungual debris c/w onychomycosis     Umbilicated papule    Erythematous-base heme-crusted tan verrucoid plaque consistent with inflamed seborrheic keratosis     Erythematous Silvery Scaling Plaque c/w Psoriasis     See annotation          Assessment / Plan:      Pathology Orders:     Normal Orders This Visit    Specimen to Pathology, Dermatology     Questions:    Procedure Type:  Dermatology and skin neoplasms    Number of Specimens:  1    ------------------------:  -------------------------    Spec 1 Procedure:  Biopsy    Spec 1 Clinical Impression:  r/o adnexal neoplasm vs resolving seroma    Spec 1 Source:  left post auricular scalp        Disease of skin and subcutaneous tissue  -     Specimen to Pathology, Dermatology    Punch biopsy procedure note:  Punch biopsy performed after verbal consent obtained. Area marked and prepped with alcohol. Approximately 1cc of 1% lidocaine with epinephrine injected. 3 mm disposable punch used to remove lesion. Hemostasis obtained and biopsy site closed with 1 - 2 Prolene sutures. Wound care instructions reviewed with patient and handout given.      Sebopsoriasis - resolved  D/c diprolene cream -- ok to use bid prn flare             Follow up in about 2 weeks (around 3/2/2020).

## 2020-02-17 NOTE — PATIENT INSTRUCTIONS
"Punch Biopsy Wound Care    Your doctor has performed a punch biopsy today.  A band aid and antibiotic ointment has been placed over the site.  This should remain in place for 24 hours.  It is recommended that you keep the area dry for the first 24 hours.  After 24 hours, you may remove the band aid and wash the area with warm soap and water and apply Vaseline jelly.  Many patients prefer to use Neosporin or Bacitracin ointment.  This is acceptable; however know that you can develop an allergy to this medication even if you have used it safely for years.  It is important to keep the area moist.  Letting it dry out and get air slows healing time, will worsen the scar, and make it more difficult to remove the stitches if they were placed.  Band aid is optional after first 24 hours.      If you notice increasing redness, tenderness, pain, or yellow drainage at the biopsy or surgical site, please notify your doctor.  These are signs of an infection.    If your biopsy/surgical site is bleeding, apply firm pressure for 15 minutes straight.  Repeat for another 15 minutes, if it is still bleeding.   If the surgical site continues to bleed, then please contact your doctor.      For [facility] users:   You will receive a [facility] notification after the pathologist has finished reviewing your biopsy specimen. Pathology results, however, will not be released online so you will see a "no content" message. Once your dermatologist reviews and clinically correlates your biopsy results, you will either receive a letter in the mail with the results of a phone call from your doctor's office if further explanation or treatment is warranted.       [address] [place] [address]/ [phone] FAX/ [email or web address]         "

## 2020-02-22 LAB
FINAL PATHOLOGIC DIAGNOSIS: NORMAL
GROSS: NORMAL
MICROSCOPIC EXAM: NORMAL

## 2020-03-02 ENCOUNTER — CLINICAL SUPPORT (OUTPATIENT)
Dept: DERMATOLOGY | Facility: CLINIC | Age: 65
End: 2020-03-02
Payer: COMMERCIAL

## 2020-03-02 PROCEDURE — 99024 POSTOP FOLLOW-UP VISIT: CPT | Mod: S$GLB,,, | Performed by: DERMATOLOGY

## 2020-03-02 PROCEDURE — 99024 PR POST-OP FOLLOW-UP VISIT: ICD-10-PCS | Mod: S$GLB,,, | Performed by: DERMATOLOGY

## 2020-03-02 NOTE — PROGRESS NOTES
Suture Removal note:  CC: 65 y.o. male patient is here for suture removal.         HPI: Patient is s/p punch biopsy of Granulation from the Left postauricular scalp on 02/17/2020.  Patient reports no problems.    FINAL PATHOLOGIC DIAGNOSIS  Skin, left postauricular scalp, punch biopsy:  -GRANULATION TISSUE AND MIXED INFLAMMATION IN THE DEEP DERMIS, see comment  COMMENT: The histological findings granulomatous inflammation (see microscopic description) are overall  nonspecific but could represent a resolving seroma (as clinically suspected), or the sequela of a previously  ruptured cyst, previously ruptured hair follicle, trauma, or other. Although special stains are negative for  organisms, these are usually low yield in nature and if an infectious etiology is suspected, correlation with  culture is recommended.    Diagnosed by: AGUSTINA LEOS   Electronically Signed By: Agustina Leos M.D. 2/22/2020 15:37     WOUND PE:  Sutures intact.  Wound healing well.  Good approximation of skin edges.  No signs or symptoms of infection.    IMPRESSION:  s/p punch biopsy of Granulation from the Left postauricular scalp on 02/17/2020.    PLAN:  Sutures removed today.  Continue wound care.    RTC: PRN

## 2020-04-09 DIAGNOSIS — R16.1 SPLENOMEGALY: Primary | ICD-10-CM

## 2020-04-30 ENCOUNTER — PATIENT MESSAGE (OUTPATIENT)
Dept: DERMATOLOGY | Facility: CLINIC | Age: 65
End: 2020-04-30

## 2020-06-15 ENCOUNTER — LAB VISIT (OUTPATIENT)
Dept: LAB | Facility: HOSPITAL | Age: 65
End: 2020-06-15
Payer: COMMERCIAL

## 2020-06-15 DIAGNOSIS — R16.1 SPLENOMEGALY: ICD-10-CM

## 2020-06-15 LAB
ALBUMIN SERPL BCP-MCNC: 4.6 G/DL (ref 3.5–5.2)
ALP SERPL-CCNC: 59 U/L (ref 55–135)
ALT SERPL W/O P-5'-P-CCNC: 14 U/L (ref 10–44)
ANION GAP SERPL CALC-SCNC: 6 MMOL/L (ref 8–16)
AST SERPL-CCNC: 15 U/L (ref 10–40)
BASOPHILS # BLD AUTO: 0.05 K/UL (ref 0–0.2)
BASOPHILS NFR BLD: 0.6 % (ref 0–1.9)
BILIRUB SERPL-MCNC: 2.6 MG/DL (ref 0.1–1)
BUN SERPL-MCNC: 23 MG/DL (ref 8–23)
CALCIUM SERPL-MCNC: 9.6 MG/DL (ref 8.7–10.5)
CHLORIDE SERPL-SCNC: 109 MMOL/L (ref 95–110)
CO2 SERPL-SCNC: 23 MMOL/L (ref 23–29)
CREAT SERPL-MCNC: 1 MG/DL (ref 0.5–1.4)
DIFFERENTIAL METHOD: ABNORMAL
EOSINOPHIL # BLD AUTO: 0.1 K/UL (ref 0–0.5)
EOSINOPHIL NFR BLD: 1 % (ref 0–8)
ERYTHROCYTE [DISTWIDTH] IN BLOOD BY AUTOMATED COUNT: 13.6 % (ref 11.5–14.5)
EST. GFR  (AFRICAN AMERICAN): >60 ML/MIN/1.73 M^2
EST. GFR  (NON AFRICAN AMERICAN): >60 ML/MIN/1.73 M^2
GLUCOSE SERPL-MCNC: 109 MG/DL (ref 70–110)
HCT VFR BLD AUTO: 37.6 % (ref 40–54)
HGB BLD-MCNC: 12.9 G/DL (ref 14–18)
IGA SERPL-MCNC: 82 MG/DL (ref 40–350)
IGG SERPL-MCNC: 1812 MG/DL (ref 650–1600)
IGM SERPL-MCNC: 118 MG/DL (ref 50–300)
IMM GRANULOCYTES # BLD AUTO: 0.04 K/UL (ref 0–0.04)
IMM GRANULOCYTES NFR BLD AUTO: 0.5 % (ref 0–0.5)
LYMPHOCYTES # BLD AUTO: 1.8 K/UL (ref 1–4.8)
LYMPHOCYTES NFR BLD: 23.1 % (ref 18–48)
MCH RBC QN AUTO: 31.7 PG (ref 27–31)
MCHC RBC AUTO-ENTMCNC: 34.3 G/DL (ref 32–36)
MCV RBC AUTO: 92 FL (ref 82–98)
MONOCYTES # BLD AUTO: 0.6 K/UL (ref 0.3–1)
MONOCYTES NFR BLD: 7.7 % (ref 4–15)
NEUTROPHILS # BLD AUTO: 5.3 K/UL (ref 1.8–7.7)
NEUTROPHILS NFR BLD: 67.1 % (ref 38–73)
NRBC BLD-RTO: 0 /100 WBC
PLATELET # BLD AUTO: 182 K/UL (ref 150–350)
PMV BLD AUTO: 10.6 FL (ref 9.2–12.9)
POTASSIUM SERPL-SCNC: 4.6 MMOL/L (ref 3.5–5.1)
PROT SERPL-MCNC: 8 G/DL (ref 6–8.4)
RBC # BLD AUTO: 4.07 M/UL (ref 4.6–6.2)
SODIUM SERPL-SCNC: 138 MMOL/L (ref 136–145)
WBC # BLD AUTO: 7.92 K/UL (ref 3.9–12.7)

## 2020-06-15 PROCEDURE — 84165 PROTEIN E-PHORESIS SERUM: CPT

## 2020-06-15 PROCEDURE — 84165 PROTEIN E-PHORESIS SERUM: CPT | Mod: 26,,, | Performed by: PATHOLOGY

## 2020-06-15 PROCEDURE — 36415 COLL VENOUS BLD VENIPUNCTURE: CPT

## 2020-06-15 PROCEDURE — 83520 IMMUNOASSAY QUANT NOS NONAB: CPT | Mod: 59

## 2020-06-15 PROCEDURE — 85025 COMPLETE CBC W/AUTO DIFF WBC: CPT

## 2020-06-15 PROCEDURE — 80053 COMPREHEN METABOLIC PANEL: CPT

## 2020-06-15 PROCEDURE — 82784 ASSAY IGA/IGD/IGG/IGM EACH: CPT | Mod: 59

## 2020-06-15 PROCEDURE — 84165 PATHOLOGIST INTERPRETATION SPE: ICD-10-PCS | Mod: 26,,, | Performed by: PATHOLOGY

## 2020-06-16 ENCOUNTER — TELEPHONE (OUTPATIENT)
Dept: HEMATOLOGY/ONCOLOGY | Facility: CLINIC | Age: 65
End: 2020-06-16

## 2020-06-16 LAB
ALBUMIN SERPL ELPH-MCNC: 4.61 G/DL (ref 3.35–5.55)
ALPHA1 GLOB SERPL ELPH-MCNC: 0.3 G/DL (ref 0.17–0.41)
ALPHA2 GLOB SERPL ELPH-MCNC: 0.63 G/DL (ref 0.43–0.99)
B-GLOBULIN SERPL ELPH-MCNC: 0.55 G/DL (ref 0.5–1.1)
GAMMA GLOB SERPL ELPH-MCNC: 1.71 G/DL (ref 0.67–1.58)
KAPPA LC SER QL IA: 2.4 MG/DL (ref 0.33–1.94)
KAPPA LC/LAMBDA SER IA: 2.22 (ref 0.26–1.65)
LAMBDA LC SER QL IA: 1.08 MG/DL (ref 0.57–2.63)
PATHOLOGIST INTERPRETATION SPE: NORMAL
PROT SERPL-MCNC: 7.8 G/DL (ref 6–8.4)

## 2020-06-17 ENCOUNTER — TELEPHONE (OUTPATIENT)
Dept: HEMATOLOGY/ONCOLOGY | Facility: CLINIC | Age: 65
End: 2020-06-17

## 2020-06-17 ENCOUNTER — OFFICE VISIT (OUTPATIENT)
Dept: HEMATOLOGY/ONCOLOGY | Facility: CLINIC | Age: 65
End: 2020-06-17
Payer: COMMERCIAL

## 2020-06-17 DIAGNOSIS — D58.0 HEREDITARY SPHEROCYTIC HEMOLYTIC ANEMIA: Chronic | ICD-10-CM

## 2020-06-17 DIAGNOSIS — R16.1 SPLENOMEGALY: ICD-10-CM

## 2020-06-17 DIAGNOSIS — D47.2 MGUS (MONOCLONAL GAMMOPATHY OF UNKNOWN SIGNIFICANCE): Primary | ICD-10-CM

## 2020-06-17 PROCEDURE — 99213 PR OFFICE/OUTPT VISIT, EST, LEVL III, 20-29 MIN: ICD-10-PCS | Mod: 95,,, | Performed by: INTERNAL MEDICINE

## 2020-06-17 PROCEDURE — 99213 OFFICE O/P EST LOW 20 MIN: CPT | Mod: 95,,, | Performed by: INTERNAL MEDICINE

## 2020-06-17 NOTE — TELEPHONE ENCOUNTER
----- Message from Annette Izaguirre sent at 6/17/2020 12:22 PM CDT -----  Regarding: Returning call  Contact: [phone]  Patient Returning Your Phone Call

## 2020-06-17 NOTE — Clinical Note
Bone marrow in 1-2 wks  Cbc, [id number], light chains on day of bone marrow  F/u after marrow

## 2020-06-17 NOTE — PROGRESS NOTES
Established Patient - Audio Only Telehealth Visit     The patient location is: home  The chief complaint leading to consultation is: anemia, follow up  Visit type: Virtual visit with audio only (telephone)  Total time spent with patient: 20 minutes       The reason for the audio only service rather than synchronous audio and video virtual visit was related to technical difficulties or patient preference/necessity.     Each patient to whom I provide medical services by telemedicine is:  (1) informed of the relationship between the physician and patient and the respective role of any other health care provider with respect to management of the patient; and (2) notified that they may decline to receive medical services by telemedicine and may withdraw from such care at any time. Patient verbally consented to receive this service via voice-only telephone call.    HPI: Mr. Sahni, 64, is here for a follow up visit for anemia. He has chronic hepatitis C, chronic anemia, gall stones, HTn.  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 He was last seen here by Dr.Archie Sahni in 2013. His CBC and other labs ( bilirubin, retic count) at that time suggested that he had hemolytic anemia or compensated hemolysis.  CBC from 11/2012 showed hemoglobin of 13.9 and  MCV was 89.  His indirect bilirubin has been elevated.  He had numerous gallstones and he has splenomegaly.  Hemoglobin electrophoresis was normal. G6PD was normal. JUDD was negative. Haptoglobin was low. Osmotic fragility was ordered, but was not done.   There is no family h/o anemia/ other hematologic condition.       Interval History: Denies yellowing of skin/eyes. No abdominal pain, fatigue, weight loss or change of appetite. No h/o discolored urine.        Review of Systems   Constitutional: Positive for malaise/fatigue. Negative for chills, fever and weight loss.   HENT: Negative for congestion, ear discharge, ear pain and nosebleeds.    Eyes: Negative for  blurred vision and photophobia.   Respiratory: Negative for cough, hemoptysis and sputum production.    Cardiovascular: Negative for palpitations, orthopnea and claudication.   Gastrointestinal: Negative for abdominal pain, diarrhea, heartburn, nausea and vomiting.   Genitourinary: Negative for dysuria, frequency, hematuria and urgency.   Musculoskeletal: Negative for back pain and myalgias.   Skin: Negative for rash.   Neurological: Negative for dizziness, tingling, tremors, speech change, seizures and headaches.   Endo/Heme/Allergies: Does not bruise/bleed easily.   Psychiatric/Behavioral: Negative for depression, hallucinations, substance abuse and suicidal ideas.            Current Outpatient Medications   Medication Sig    betamethasone dipropionate (DIPROLENE) 0.05 % cream AAA ears and scalp bid prn    folic acid (FOLVITE) 1 MG tablet Take 1 mg by mouth once daily.    losartan (COZAAR) 100 MG tablet Take 1 tablet (100 mg total) by mouth once daily.    sildenafil (VIAGRA) 100 MG tablet Take 1 tablet (100 mg total) by mouth daily as needed for Erectile Dysfunction.     Current Facility-Administered Medications   Medication    triamcinolone acetonide injection 10 mg                 Latest Ref Rng & Units 9/12/2013   Fragility-Fresh       Test Not Performed   Osmotic Frag 0.5 g/dL NaCl       Test Not Performed   Osmotic Frag 0.6 g/dL NaCl       Test Not Performed   Osmotic Frag 0.65 g/dL NaCl       Test Not Performed   Osmotic Frag 0.75 g/dL NaCl       Test Not Performed   Osmotic Frag Comment       Test Not Performed   Percent PNH Cells      0.000 - 0.004 % 0.000   PNH Interpretation       See note   Vitamin B-12      210 - 950 pg/mL 409   Ferritin      20.0 - 300.0 ng/mL 423 (H)   RBC Folate      280 - 791 ng/mL 648           Ref Range & Units 6yr ago   Hgb A2 Quant 1.5 - 3.8 % 3.1    Hemoglobin Bands   Hb A and Hb A2    Hemoglobin Electrophoresis Interp   Normal       Component      Latest Ref Rng & Units  9/3/2013   Direct Lety (JUDD)       NEG   G6PD Quant      7.0 - 20.5 U/G Hg 13.9   Haptoglobin      30 - 250 mg/dL <10 (L)      Component      Latest Ref Rng & Units 6/15/2020 12/18/2019   WBC      3.90 - 12.70 K/uL 7.92    RBC      4.60 - 6.20 M/uL 4.07 (L)    Hemoglobin      14.0 - 18.0 g/dL 12.9 (L)    Hematocrit      40.0 - 54.0 % 37.6 (L)    MCV      82 - 98 fL 92    MCH      27.0 - 31.0 pg 31.7 (H)    MCHC      32.0 - 36.0 g/dL 34.3    RDW      11.5 - 14.5 % 13.6    Platelets      150 - 350 K/uL 182    MPV      9.2 - 12.9 fL 10.6    Immature Granulocytes      0.0 - 0.5 % 0.5    Gran # (ANC)      1.8 - 7.7 K/uL 5.3    Immature Grans (Abs)      0.00 - 0.04 K/uL 0.04    Lymph #      1.0 - 4.8 K/uL 1.8    Mono #      0.3 - 1.0 K/uL 0.6    Eos #      0.0 - 0.5 K/uL 0.1    Baso #      0.00 - 0.20 K/uL 0.05    nRBC      0 /100 WBC 0    Gran%      38.0 - 73.0 % 67.1    Lymph%      18.0 - 48.0 % 23.1    Mono%      4.0 - 15.0 % 7.7    Eosinophil%      0.0 - 8.0 % 1.0    Basophil%      0.0 - 1.9 % 0.6    Differential Method       Automated    Sodium      136 - 145 mmol/L 138    Potassium      3.5 - 5.1 mmol/L 4.6    Chloride      95 - 110 mmol/L 109    CO2      23 - 29 mmol/L 23    Glucose      70 - 110 mg/dL 109    BUN, Bld      8 - 23 mg/dL 23    Creatinine      0.5 - 1.4 mg/dL 1.0    Calcium      8.7 - 10.5 mg/dL 9.6    PROTEIN TOTAL      6.0 - 8.4 g/dL 8.0    Albumin      3.5 - 5.2 g/dL 4.6    BILIRUBIN TOTAL      0.1 - 1.0 mg/dL 2.6 (H)    Alkaline Phosphatase      55 - 135 U/L 59    AST      10 - 40 U/L 15    ALT      10 - 44 U/L 14    Anion Gap      8 - 16 mmol/L 6 (L)    eGFR if African American      >60 mL/min/1.73 m:2 >60.0    eGFR if non African American      >60 mL/min/1.73 m:2 >60.0    Fragility-Fresh        Test Not Performed   Osmotic Frag 0.5 g/dL NaCl      3 - 53 %hemol  74 (H)   Osmotic Frag 0.6 g/dL NaCl      14 - 74 %hemol  86 (H)   Osmotic Frag 0.65 g/dL NaCl      4 - 40 %hemol  77 (H)   Osmotic  Frag 0.75 g/dL NaCl      1 - 11 %hemol  38 (H)   Osmotic Frag Comment        SEE BELOW   Sex of Control Vial        Received   Iron      45 - 160 ug/dL  79   Transferrin      200 - 375 mg/dL  224   TIBC      250 - 450 ug/dL  332   Saturated Iron      20 - 50 %  24   LD      110 - 260 U/L  123   Retic      0.4 - 2.0 %  3.4 (H)   Cold Agglutinin Titer      <1:64 titer  <1:64   Haptoglobin      30 - 250 mg/dL  20 (L)   Ferritin      20.0 - 300.0 ng/mL  385 (H)   Vitamin B-12      210 - 950 pg/mL  419     12/18/19 serum SHAWN: IgG kappa specific monoclonal band in gamma.      12./18/19 osmotic fragility: Increased erythrocyte osmotic fragility. These results may be due to spherocytosis of any cause, such as hereditary   spherocytosis, immune hemolytic anemia, or may reflect the presence of spherocytes that commonly are observed following blood transfusion. Some cases of congenital   nonspherocytic hemolytic anemia may also be associated with increased osmotic fragility in this test. These include congenital hemolytic anemia due to pyruvate kinase   deficiency or glucose-6-phosphate dehydrogenase deficiency or unstable hemoglobin hemolytic anemia. Some medications may cause an abnormal osmotic fragility. Results must be   interpreted in the context of other clinical and laboratory data, including examination of close relatives (parents, siblings, children) to determine mode of inheritance,   results of Lety test, and erythrocyte morphology. EMA binding (band 3 fluorescence staining) flow cytometry testing is available as a component of the RBC Membrane Evaluation (RBCME).      Component      Latest Ref Rng & Units 6/15/2020   Warner Free Light Chains      0.33 - 1.94 mg/dL 2.40 (H)   Lambda Free Light Chains      0.57 - 2.63 mg/dL 1.08   Kappa/Lambda FLC Ratio      0.26 - 1.65 2.22 (H)   IgG - Serum      650 - 1600 mg/dL 1812 (H)   IgA      40 - 350 mg/dL 82   IgM      50 - 300 mg/dL 118       6/15/20 SPEP :Normal total  protein. Normal gamma globulins are decreased. There is a monoclonal protein peak in gamma measuring 1.30 g/dL, previously 1.18 g/dL.     Assessment:     1. Chronic anemia  2. Gallstones  3. H/o hepatitis C  4. Monoclonal protein  5. Essential HTN        Plan:     1. He had multiple gall stones, low haptoglobin, mild conjugated+ unconjugated hyperbilirubinemia, splenomegaly, reticulocytosis in 2013--all suggestive for a hemolytic process. He has never been transfused. No clear family h/o anemia. He denies pruritus, discolored urine, abdominal pain, fatigue now. He had normal hemoglobin electrophoresis in 2013. PNH screen was normal. JUDD was negative. LDH was normal. Haptoglobin was low. G6PD was normal.   B12, folate were normal. His most recent hemoglobin is 12.9 g/dl on 6/15/20 with normocytic picture. Bilirubin 2.6 on 6/15/20.   Haptoglobin was low, but LDH normal in Dec 2019. Serum iron saturation was normal. Cold agglutinin titer was normal/ negative. Reticulocyte was slightly elevated at 3.4%.  He has increased osmotic fragility-possibly suggestive of HS. He will continue folic acid daily.           3. He has been treated successfully.     4. He has IgG kappa monoclonal protein on SPEP. Most recent M spike ( 6/15/20) is 1.3 g/dl. sFLC ratio 2.22. He ahs normal Cr, calcium and mild anemia. 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 Bone marrow biopsy will be scheduled. No back pain /bone pain /headaches/ s/s of neuropathy.      5. On Losartan, follows with .                This service was not originating from a related E/M service provided within the previous 7 days nor will  to an E/M service or procedure within the next 24 hours or my soonest available appointment.  Prevailing standard of care was able to be met in this audio-only visit.

## 2020-06-29 ENCOUNTER — PATIENT OUTREACH (OUTPATIENT)
Dept: ADMINISTRATIVE | Facility: OTHER | Age: 65
End: 2020-06-29

## 2020-06-29 ENCOUNTER — TELEPHONE (OUTPATIENT)
Dept: HEMATOLOGY/ONCOLOGY | Facility: CLINIC | Age: 65
End: 2020-06-29

## 2020-06-29 NOTE — TELEPHONE ENCOUNTER
Lm for appt date/time with call back number  Also inform pt to arrive to fl 3 for labs and fl 5 for bmbx

## 2020-06-29 NOTE — PROGRESS NOTES
Patient's chart was reviewed for overdue ERIN topics.  Immunizations reconciled.    Orders placed:n/a  Labs Linked:n/a

## 2020-06-29 NOTE — PROGRESS NOTES
65 y.o. patient with anemia, presented at BMT clinic for bone marrow biopsy. Tolerated procedure well. Not in any distress.    Sona Sutton NP  Hematology/Oncology/BMT

## 2020-06-30 ENCOUNTER — OFFICE VISIT (OUTPATIENT)
Dept: DERMATOLOGY | Facility: CLINIC | Age: 65
End: 2020-06-30
Payer: COMMERCIAL

## 2020-06-30 ENCOUNTER — LAB VISIT (OUTPATIENT)
Dept: LAB | Facility: HOSPITAL | Age: 65
End: 2020-06-30
Attending: INTERNAL MEDICINE
Payer: COMMERCIAL

## 2020-06-30 ENCOUNTER — OFFICE VISIT (OUTPATIENT)
Dept: HEMATOLOGY/ONCOLOGY | Facility: CLINIC | Age: 65
End: 2020-06-30
Payer: COMMERCIAL

## 2020-06-30 VITALS
OXYGEN SATURATION: 100 % | SYSTOLIC BLOOD PRESSURE: 141 MMHG | TEMPERATURE: 98 F | RESPIRATION RATE: 16 BRPM | DIASTOLIC BLOOD PRESSURE: 60 MMHG | HEART RATE: 75 BPM

## 2020-06-30 DIAGNOSIS — D47.2 MGUS (MONOCLONAL GAMMOPATHY OF UNKNOWN SIGNIFICANCE): ICD-10-CM

## 2020-06-30 DIAGNOSIS — L63.9 ALOPECIA AREATA: Primary | ICD-10-CM

## 2020-06-30 DIAGNOSIS — D47.2 MGUS (MONOCLONAL GAMMOPATHY OF UNKNOWN SIGNIFICANCE): Primary | ICD-10-CM

## 2020-06-30 LAB
B2 MICROGLOB SERPL-MCNC: 3.2 UG/ML (ref 0–2.5)
BASOPHILS # BLD AUTO: 0.03 K/UL (ref 0–0.2)
BASOPHILS NFR BLD: 0.5 % (ref 0–1.9)
DIFFERENTIAL METHOD: ABNORMAL
EOSINOPHIL # BLD AUTO: 0.1 K/UL (ref 0–0.5)
EOSINOPHIL NFR BLD: 2.6 % (ref 0–8)
ERYTHROCYTE [DISTWIDTH] IN BLOOD BY AUTOMATED COUNT: 13.9 % (ref 11.5–14.5)
HCT VFR BLD AUTO: 35.2 % (ref 40–54)
HGB BLD-MCNC: 12.5 G/DL (ref 14–18)
IMM GRANULOCYTES # BLD AUTO: 0.02 K/UL (ref 0–0.04)
IMM GRANULOCYTES NFR BLD AUTO: 0.4 % (ref 0–0.5)
LYMPHOCYTES # BLD AUTO: 1.4 K/UL (ref 1–4.8)
LYMPHOCYTES NFR BLD: 26.1 % (ref 18–48)
MCH RBC QN AUTO: 32.5 PG (ref 27–31)
MCHC RBC AUTO-ENTMCNC: 35.5 G/DL (ref 32–36)
MCV RBC AUTO: 91 FL (ref 82–98)
MONOCYTES # BLD AUTO: 0.5 K/UL (ref 0.3–1)
MONOCYTES NFR BLD: 9.5 % (ref 4–15)
NEUTROPHILS # BLD AUTO: 3.3 K/UL (ref 1.8–7.7)
NEUTROPHILS NFR BLD: 60.9 % (ref 38–73)
NRBC BLD-RTO: 0 /100 WBC
PLATELET # BLD AUTO: 161 K/UL (ref 150–350)
PMV BLD AUTO: 9.6 FL (ref 9.2–12.9)
RBC # BLD AUTO: 3.85 M/UL (ref 4.6–6.2)
WBC # BLD AUTO: 5.48 K/UL (ref 3.9–12.7)

## 2020-06-30 PROCEDURE — 88271 CYTOGENETICS DNA PROBE: CPT | Mod: 59

## 2020-06-30 PROCEDURE — 88184 FLOWCYTOMETRY/ TC 1 MARKER: CPT | Performed by: PATHOLOGY

## 2020-06-30 PROCEDURE — 88311 DECALCIFY TISSUE: CPT | Performed by: PATHOLOGY

## 2020-06-30 PROCEDURE — 36415 COLL VENOUS BLD VENIPUNCTURE: CPT

## 2020-06-30 PROCEDURE — 3078F DIAST BP <80 MM HG: CPT | Mod: CPTII,S$GLB,, | Performed by: DERMATOLOGY

## 2020-06-30 PROCEDURE — 99999 PR PBB SHADOW E&M-EST. PATIENT-LVL II: ICD-10-PCS | Mod: PBBFAC,,, | Performed by: DERMATOLOGY

## 2020-06-30 PROCEDURE — 88342 IMHCHEM/IMCYTCHM 1ST ANTB: CPT | Mod: 59 | Performed by: PATHOLOGY

## 2020-06-30 PROCEDURE — 88305 TISSUE EXAM BY PATHOLOGIST: CPT | Mod: 26,,, | Performed by: PATHOLOGY

## 2020-06-30 PROCEDURE — 99499 UNLISTED E&M SERVICE: CPT | Mod: S$GLB,,, | Performed by: NURSE PRACTITIONER

## 2020-06-30 PROCEDURE — 88305 TISSUE EXAM BY PATHOLOGIST: CPT | Performed by: PATHOLOGY

## 2020-06-30 PROCEDURE — 88264 CHROMOSOME ANALYSIS 20-25: CPT

## 2020-06-30 PROCEDURE — 85097 PR  BONE MARROW,SMEAR INTERPRETATION: ICD-10-PCS | Mod: 59,,, | Performed by: PATHOLOGY

## 2020-06-30 PROCEDURE — 85097 BONE MARROW INTERPRETATION: CPT | Mod: 59,,, | Performed by: PATHOLOGY

## 2020-06-30 PROCEDURE — 85025 COMPLETE CBC W/AUTO DIFF WBC: CPT

## 2020-06-30 PROCEDURE — 88311 PR  DECALCIFY TISSUE: ICD-10-PCS | Mod: 26,,, | Performed by: PATHOLOGY

## 2020-06-30 PROCEDURE — 88342 IMHCHEM/IMCYTCHM 1ST ANTB: CPT | Mod: 26,,, | Performed by: PATHOLOGY

## 2020-06-30 PROCEDURE — 88342 IMHCHEM/IMCYTCHM 1ST ANTB: CPT | Mod: 91 | Performed by: PATHOLOGY

## 2020-06-30 PROCEDURE — 88185 FLOWCYTOMETRY/TC ADD-ON: CPT | Performed by: PATHOLOGY

## 2020-06-30 PROCEDURE — 88313 PR  SPECIAL STAINS,GROUP II: ICD-10-PCS | Mod: 26,,, | Performed by: PATHOLOGY

## 2020-06-30 PROCEDURE — 99499 NO LOS: ICD-10-PCS | Mod: S$GLB,,, | Performed by: NURSE PRACTITIONER

## 2020-06-30 PROCEDURE — 3077F SYST BP >= 140 MM HG: CPT | Mod: CPTII,S$GLB,, | Performed by: DERMATOLOGY

## 2020-06-30 PROCEDURE — 88341 IMHCHEM/IMCYTCHM EA ADD ANTB: CPT | Mod: 59 | Performed by: PATHOLOGY

## 2020-06-30 PROCEDURE — 82232 ASSAY OF BETA-2 PROTEIN: CPT

## 2020-06-30 PROCEDURE — 88313 SPECIAL STAINS GROUP 2: CPT | Mod: 26,,, | Performed by: PATHOLOGY

## 2020-06-30 PROCEDURE — 88237 TISSUE CULTURE BONE MARROW: CPT

## 2020-06-30 PROCEDURE — 88342 CHG IMMUNOCYTOCHEMISTRY: ICD-10-PCS | Mod: 26,,, | Performed by: PATHOLOGY

## 2020-06-30 PROCEDURE — 3077F PR MOST RECENT SYSTOLIC BLOOD PRESSURE >= 140 MM HG: ICD-10-PCS | Mod: CPTII,S$GLB,, | Performed by: DERMATOLOGY

## 2020-06-30 PROCEDURE — 88313 SPECIAL STAINS GROUP 2: CPT | Performed by: PATHOLOGY

## 2020-06-30 PROCEDURE — 83520 IMMUNOASSAY QUANT NOS NONAB: CPT | Mod: 59

## 2020-06-30 PROCEDURE — 88311 DECALCIFY TISSUE: CPT | Mod: 26,,, | Performed by: PATHOLOGY

## 2020-06-30 PROCEDURE — 38222 DX BONE MARROW BX & ASPIR: CPT | Mod: LT,S$GLB,, | Performed by: NURSE PRACTITIONER

## 2020-06-30 PROCEDURE — 88274 CYTOGENETICS 25-99: CPT | Mod: 59

## 2020-06-30 PROCEDURE — 88305 TISSUE EXAM BY PATHOLOGIST: ICD-10-PCS | Mod: 26,,, | Performed by: PATHOLOGY

## 2020-06-30 PROCEDURE — 38222 PR BONE MARROW BIOPSY(IES) W/ASPIRATION(S); DIAGNOSTIC: ICD-10-PCS | Mod: LT,S$GLB,, | Performed by: NURSE PRACTITIONER

## 2020-06-30 PROCEDURE — 99999 PR PBB SHADOW E&M-EST. PATIENT-LVL III: CPT | Mod: PBBFAC,,, | Performed by: NURSE PRACTITIONER

## 2020-06-30 PROCEDURE — 88189 PR  FLOWCYTOMETRY/READ, 16 & > MARKERS: ICD-10-PCS | Mod: ,,, | Performed by: PATHOLOGY

## 2020-06-30 PROCEDURE — 88341 IMHCHEM/IMCYTCHM EA ADD ANTB: CPT | Mod: 26,,, | Performed by: PATHOLOGY

## 2020-06-30 PROCEDURE — 3078F PR MOST RECENT DIASTOLIC BLOOD PRESSURE < 80 MM HG: ICD-10-PCS | Mod: CPTII,S$GLB,, | Performed by: DERMATOLOGY

## 2020-06-30 PROCEDURE — 88341 PR IHC OR ICC EACH ADD'L SINGLE ANTIBODY  STAINPR: ICD-10-PCS | Mod: 26,,, | Performed by: PATHOLOGY

## 2020-06-30 PROCEDURE — 88189 FLOWCYTOMETRY/READ 16 & >: CPT | Mod: ,,, | Performed by: PATHOLOGY

## 2020-06-30 PROCEDURE — 88341 IMHCHEM/IMCYTCHM EA ADD ANTB: CPT | Performed by: PATHOLOGY

## 2020-06-30 PROCEDURE — 88299 UNLISTED CYTOGENETIC STUDY: CPT

## 2020-06-30 PROCEDURE — 99212 PR OFFICE/OUTPT VISIT, EST, LEVL II, 10-19 MIN: ICD-10-PCS | Mod: S$GLB,,, | Performed by: DERMATOLOGY

## 2020-06-30 PROCEDURE — 99212 OFFICE O/P EST SF 10 MIN: CPT | Mod: S$GLB,,, | Performed by: DERMATOLOGY

## 2020-06-30 PROCEDURE — 99999 PR PBB SHADOW E&M-EST. PATIENT-LVL II: CPT | Mod: PBBFAC,,, | Performed by: DERMATOLOGY

## 2020-06-30 PROCEDURE — 99999 PR PBB SHADOW E&M-EST. PATIENT-LVL III: ICD-10-PCS | Mod: PBBFAC,,, | Performed by: NURSE PRACTITIONER

## 2020-06-30 NOTE — PROGRESS NOTES
Subjective:       Patient ID:  Jose Sahni is a 65 y.o. male who presents for   Chief Complaint   Patient presents with    Lesion     recheck site     Pt seen 1/2020 for IL K to left post scalp sebopsoriasis patch. Pt then presented 2/2020 with an alopetic patch and bx was done:    Skin, left postauricular scalp, punch biopsy:   -GRANULATION TISSUE AND MIXED INFLAMMATION IN THE DEEP DERMIS, see comment      COMMENT:  The histological findings granulomatous inflammation (see   microscopic description) are overall nonspecific but could represent a   resolving seroma (as clinically suspected), or the sequela of a previously   ruptured cyst, previously ruptured hair follicle, trauma, or other.      Pt states area has resolved and is no longer symptomatic. No new areas      Review of Systems   Skin: Negative for itching and rash.   Allergic/Immunologic: Negative for environmental allergies.        Objective:    Physical Exam   Constitutional: He appears well-developed and well-nourished. No distress.   Neurological: He is alert and oriented to person, place, and time. He is not disoriented.   Psychiatric: He has a normal mood and affect.   Skin:   Areas Examined (abnormalities noted in diagram):   Scalp / Hair Palpated and Inspected              Diagram Legend     Erythematous scaling macule/papule c/w actinic keratosis       Vascular papule c/w angioma      Pigmented verrucoid papule/plaque c/w seborrheic keratosis      Yellow umbilicated papule c/w sebaceous hyperplasia      Irregularly shaped tan macule c/w lentigo     1-2 mm smooth white papules consistent with Milia      Movable subcutaneous cyst with punctum c/w epidermal inclusion cyst      Subcutaneous movable cyst c/w pilar cyst      Firm pink to brown papule c/w dermatofibroma      Pedunculated fleshy papule(s) c/w skin tag(s)      Evenly pigmented macule c/w junctional nevus     Mildly variegated pigmented, slightly irregular-bordered macule c/w mildly  atypical nevus      Flesh colored to evenly pigmented papule c/w intradermal nevus       Pink pearly papule/plaque c/w basal cell carcinoma      Erythematous hyperkeratotic cursted plaque c/w SCC      Surgical scar with no sign of skin cancer recurrence      Open and closed comedones      Inflammatory papules and pustules      Verrucoid papule consistent consistent with wart     Erythematous eczematous patches and plaques     Dystrophic onycholytic nail with subungual debris c/w onychomycosis     Umbilicated papule    Erythematous-base heme-crusted tan verrucoid plaque consistent with inflamed seborrheic keratosis     Erythematous Silvery Scaling Plaque c/w Psoriasis     See annotation      Assessment / Plan:        Alopecia areata - left post scalp  Regrowth noted  No further treatment needed.             No follow-ups on file.

## 2020-06-30 NOTE — PROCEDURES
Bone marrow    Date/Time: 6/30/2020 8:00 AM  Performed by: Sona Sutton NP  Authorized by: Wendy Keith NP     Aspiration?: Yes    Biopsy?: Yes      PROCEDURE NOTE:  Date of Procedure:06/30/2020  Bone Marrow Biopsy and Aspiration  Indication: Anemia  Consent: Informed consent was obtained from patient.  Timeout: Done and documented.  Position: Prone  Site: Left posterior illiac crest.  Prep: Betadine.  Needle used: 11 gauge Jamshidi needle.  Anesthetic: 2% lidocaine 10 cc.  Biopsy: The biopsy needle was introduced into the marrow cavity and an aspirate was obtained without complications and sent for flow cytometry,PCPD,DNA/RNA hold and cytogenetics. Core biopsy obtained without difficulty and sent for routine histologic examination.  Complications: None.  Disposition: Left patient with primary nurse,instructed to lay on back for 20 minutes. Advised not to take shower and keep dressing clean, dry & intact for 24 hours.  Blood loss: Minimal.     Sona Sutton NP  Hematology/Oncology/BMT

## 2020-07-01 LAB
KAPPA LC SER QL IA: 2.51 MG/DL (ref 0.33–1.94)
KAPPA LC/LAMBDA SER IA: 2.15 (ref 0.26–1.65)
LAMBDA LC SER QL IA: 1.17 MG/DL (ref 0.57–2.63)

## 2020-07-05 LAB
BODY SITE - BONE MARROW: NORMAL
CLINICAL DIAGNOSIS - BONE MARROW: NORMAL
FLOW CYTOMETRY ANTIBODIES ANALYZED - BONE MARROW: NORMAL
FLOW CYTOMETRY COMMENT - BONE MARROW: NORMAL
FLOW CYTOMETRY INTERPRETATION - BONE MARROW: NORMAL

## 2020-07-08 LAB
CHROM BANDING METHOD: NORMAL
CHROMOSOME ANALYSIS BM ADDITIONAL INFORMATION: NORMAL
CHROMOSOME ANALYSIS BM RELEASED BY: NORMAL
CHROMOSOME ANALYSIS BM RESULT SUMMARY: NORMAL
CLINICAL CYTOGENETICIST REVIEW: NORMAL
KARYOTYP MAR: NORMAL
REASON FOR REFERRAL (NARRATIVE): NORMAL
REF LAB TEST METHOD: NORMAL
SPECIMEN SOURCE: NORMAL
SPECIMEN: NORMAL

## 2020-07-10 ENCOUNTER — OFFICE VISIT (OUTPATIENT)
Dept: HEMATOLOGY/ONCOLOGY | Facility: CLINIC | Age: 65
End: 2020-07-10
Payer: COMMERCIAL

## 2020-07-10 VITALS
RESPIRATION RATE: 16 BRPM | HEIGHT: 68 IN | DIASTOLIC BLOOD PRESSURE: 63 MMHG | BODY MASS INDEX: 25.23 KG/M2 | OXYGEN SATURATION: 100 % | SYSTOLIC BLOOD PRESSURE: 137 MMHG | WEIGHT: 166.44 LBS | HEART RATE: 64 BPM

## 2020-07-10 DIAGNOSIS — R16.1 SPLENOMEGALY: ICD-10-CM

## 2020-07-10 DIAGNOSIS — I10 HYPERTENSION, ESSENTIAL: ICD-10-CM

## 2020-07-10 DIAGNOSIS — D47.2 SMOLDERING MYELOMA: ICD-10-CM

## 2020-07-10 DIAGNOSIS — D47.2 MGUS (MONOCLONAL GAMMOPATHY OF UNKNOWN SIGNIFICANCE): Primary | ICD-10-CM

## 2020-07-10 DIAGNOSIS — D47.Z9 LOW GRADE B CELL LYMPHOPROLIFERATIVE DISORDER: ICD-10-CM

## 2020-07-10 DIAGNOSIS — D59.8 OTHER ACQUIRED HEMOLYTIC ANEMIAS: ICD-10-CM

## 2020-07-10 PROCEDURE — 99999 PR PBB SHADOW E&M-EST. PATIENT-LVL III: ICD-10-PCS | Mod: PBBFAC,,, | Performed by: INTERNAL MEDICINE

## 2020-07-10 PROCEDURE — 3008F BODY MASS INDEX DOCD: CPT | Mod: CPTII,S$GLB,, | Performed by: INTERNAL MEDICINE

## 2020-07-10 PROCEDURE — 3008F PR BODY MASS INDEX (BMI) DOCUMENTED: ICD-10-PCS | Mod: CPTII,S$GLB,, | Performed by: INTERNAL MEDICINE

## 2020-07-10 PROCEDURE — 99214 PR OFFICE/OUTPT VISIT, EST, LEVL IV, 30-39 MIN: ICD-10-PCS | Mod: S$GLB,,, | Performed by: INTERNAL MEDICINE

## 2020-07-10 PROCEDURE — 3078F DIAST BP <80 MM HG: CPT | Mod: CPTII,S$GLB,, | Performed by: INTERNAL MEDICINE

## 2020-07-10 PROCEDURE — 3075F SYST BP GE 130 - 139MM HG: CPT | Mod: CPTII,S$GLB,, | Performed by: INTERNAL MEDICINE

## 2020-07-10 PROCEDURE — 99999 PR PBB SHADOW E&M-EST. PATIENT-LVL III: CPT | Mod: PBBFAC,,, | Performed by: INTERNAL MEDICINE

## 2020-07-10 PROCEDURE — 3078F PR MOST RECENT DIASTOLIC BLOOD PRESSURE < 80 MM HG: ICD-10-PCS | Mod: CPTII,S$GLB,, | Performed by: INTERNAL MEDICINE

## 2020-07-10 PROCEDURE — 99214 OFFICE O/P EST MOD 30 MIN: CPT | Mod: S$GLB,,, | Performed by: INTERNAL MEDICINE

## 2020-07-10 PROCEDURE — 3075F PR MOST RECENT SYSTOLIC BLOOD PRESS GE 130-139MM HG: ICD-10-PCS | Mod: CPTII,S$GLB,, | Performed by: INTERNAL MEDICINE

## 2020-07-10 NOTE — PROGRESS NOTES
CC: Hemolytic anemia, smoldering myeloma, b cell lymphoproliferative disorder, follow up    HPI: Mr. Sahni, 64, is here for a follow up visit for anemia. He has chronic hepatitis C, chronic anemia, gall stones, HTn.  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 He was last seen here by Dr.Archie Sahni in 2013. His CBC and other labs ( bilirubin, retic count) at that time suggested that he had hemolytic anemia or compensated hemolysis.  CBC from 11/2012 showed hemoglobin of 13.9 and  MCV was 89.  His indirect bilirubin has been elevated.  He had numerous gallstones and he has splenomegaly.  Hemoglobin electrophoresis was normal. G6PD was normal. JUDD was negative. Haptoglobin was low. Osmotic fragility was ordered, but was not done.   There is no family h/o anemia/ other hematologic condition.       Interval History: Denies yellowing of skin/eyes. No abdominal pain, fatigue, weight loss or change of appetite. No h/o discolored urine. He is here after a BM biopsy. He has pain in right low back that started 2 weeks ago.          Review of Systems   Constitutional: Positive for malaise/fatigue. Negative for chills, fever and weight loss.   HENT: Negative for congestion, ear pain and nosebleeds.    Eyes: Negative for blurred vision, double vision, photophobia and pain.   Cardiovascular: Negative for palpitations, orthopnea and claudication.   Gastrointestinal: Negative for abdominal pain, diarrhea, heartburn, nausea and vomiting.   Genitourinary: Negative for dysuria, frequency and urgency.   Musculoskeletal: Negative for back pain and myalgias.   Neurological: Negative for dizziness, tingling, tremors and headaches.   Endo/Heme/Allergies: Negative for environmental allergies. Does not bruise/bleed easily.   Psychiatric/Behavioral: Negative for depression, substance abuse and suicidal ideas.     Current Outpatient Medications   Medication Sig    betamethasone dipropionate (DIPROLENE) 0.05 % cream AAA ears and  scalp bid prn    folic acid (FOLVITE) 1 MG tablet Take 1 mg by mouth once daily.    losartan (COZAAR) 100 MG tablet Take 1 tablet (100 mg total) by mouth once daily.    sildenafil (VIAGRA) 100 MG tablet Take 1 tablet (100 mg total) by mouth daily as needed for Erectile Dysfunction.     Current Facility-Administered Medications   Medication    triamcinolone acetonide injection 10 mg         Vitals:    07/10/20 0946   BP: 137/63   Pulse: 64   Resp: 16         Physical Exam   Constitutional: He is oriented to person, place, and time. He appears well-developed.   HENT:   Head: Normocephalic and atraumatic.   Mouth/Throat: No oropharyngeal exudate.   Eyes: No scleral icterus.   Cardiovascular: Normal rate.   No murmur heard.  Pulmonary/Chest: Effort normal. No respiratory distress. He has no wheezes. He has no rales.   Abdominal: Soft. He exhibits no distension. There is no abdominal tenderness.   Musculoskeletal:         General: No edema.   Lymphadenopathy:     He has no cervical adenopathy.   Neurological: He is alert and oriented to person, place, and time. No cranial nerve deficit.   Skin: Skin is warm.   Psychiatric: He has a normal mood and affect.       Ref Range & Units 6yr ago   Hgb A2 Quant 1.5 - 3.8 % 3.1    Hemoglobin Bands   Hb A and Hb A2    Hemoglobin Electrophoresis Interp   Normal       Component      Latest Ref Rng & Units 9/3/2013   Direct Lety (JUDD)       NEG   G6PD Quant      7.0 - 20.5 U/G Hg 13.9   Haptoglobin      30 - 250 mg/dL <10 (L)     12/18/19 serum SHAWN: IgG kappa specific monoclonal band in gamma.       12./18/19 osmotic fragility: Increased erythrocyte osmotic fragility. These results may be due to spherocytosis of any cause, such as hereditary   spherocytosis, immune hemolytic anemia, or may reflect the presence of spherocytes that commonly are observed following blood transfusion. Some cases of congenital   nonspherocytic hemolytic anemia may also be associated with increased  osmotic fragility in this test. These include congenital hemolytic anemia due to pyruvate kinase   deficiency or glucose-6-phosphate dehydrogenase deficiency or unstable hemoglobin hemolytic anemia. Some medications may cause an abnormal osmotic fragility. Results must be   interpreted in the context of other clinical and laboratory data, including examination of close relatives (parents, siblings, children) to determine mode of inheritance,   results of Lety test, and erythrocyte morphology. EMA binding (band 3 fluorescence staining) flow cytometry testing is available as a component of the RBC Membrane Evaluation (RBCME).        Component      Latest Ref Rng & Units 6/15/2020   [place] Free Light Chains      0.33 - 1.94 mg/dL 2.40 (H)   Lambda Free Light Chains      0.57 - 2.63 mg/dL 1.08   Kappa/Lambda FLC Ratio      0.26 - 1.65 2.22 (H)   IgG - Serum      650 - 1600 mg/dL 1812 (H)   IgA      40 - 350 mg/dL 82   IgM      50 - 300 mg/dL 118         6/15/20 SPEP :Normal total protein. Normal gamma globulins are decreased. There is a monoclonal protein peak in gamma measuring 1.30 g/dL, previously 1.18 g/dL.        6/30/20 BONE MARROW, LEFT ILIAC CREST (ASPIRATE SMEAR, TOUCH PREPARATION, CLOT SECTION,AND CORE BIOPSY):    -- PLASMA CELL NEOPLASM.  -- B-CELL LYMPHOPROLIFERATIVE DISORDER WITH CHRONIC LYMPHOCYTIC LEUKEMIA/SMALL  LYMPHOCYTIC LYMPHOMA IMMUNOPHENOTYPE.  -- NORMOCELLULAR MARROW (40%) WITH TRILINEAGE HEMATOPOIESIS.  -- ADEQUATE IRON STORAGE.  -- SEE COMMENT.      Comments  Flow cytometric analysis of bone marrow detects a kappa light chain restricted plasma cell population that expresses , CD38, CD56 (dim), and CD45 (dim). It is negative for CD19 and CD20. In addition, there  is a kappa light chain restricted B lymphocyte population showing expression of CD19, CD20 (dim), CD5 (dim), and CD23 (partial). It is negative for CD38 and FMC-7. T lymphocytes are immunophenotypically  unremarkable. The blasts  gate is not increased. Flow differential: Lymphocytes 9.7%, Monocytes 3.5%,Granulocytes 78.8%, Blast 2.5%, Debris/nRBC 5.6%, Viability 94.7%.  Immunohistochemical stains are performed on the biopsy core for greater sensitivity and further architectural.   There is a paratrabecular lymphoid aggregate containing predominantly CD20  positive B cells. The B-cells are positive for CD5, CD23, and LEF1; negative for cyclin D1 and SOX11.  Scattered CD3-positive T-cells are seen.  Taken together, there is a kappa light chain restricted plasma cell population (10% total cellularity) and a kappa light chain restricted B-cell population (<5% total cellularity). Although rare CLL/SLL with plasmacytic  differentiation has been described in literature, the overall findings favor a plasma cell neoplasm and a concurrent B-cell lymphoproliferative disorder with chronic lymphocytic leukemia/small lymphocytic  lymphoma immunophenotype. Correlation with clinical presentation and other laboratory results is required.sessment with adequate controls. -positive plasma cells comprise approximately  10% of the total cellularity.     Component      Latest Ref Rng & Units 6/30/2020   Chromosome analysis BM Result Summary       Normal   Interpretation       SEE BELOW   Results       46,XY[20]   Reason for Referral       D47.2  Monoclonal gammopathy   Specimen       Bone Marrow   Source       Test Not Performed   Method       Culture without mitogens   Banding Methods, BM Chromosome       SEE BELOW   Chromosome analysis BM Additional Information       Test Not Performed     Component      Latest Ref Rng & Units 6/30/2020 6/15/2020   WBC      3.90 - 12.70 K/uL 5.48    RBC      4.60 - 6.20 M/uL 3.85 (L)    Hemoglobin      14.0 - 18.0 g/dL 12.5 (L)    Hematocrit      40.0 - 54.0 % 35.2 (L)    MCV      82 - 98 fL 91    MCH      27.0 - 31.0 pg 32.5 (H)    MCHC      32.0 - 36.0 g/dL 35.5    RDW      11.5 - 14.5 % 13.9    Platelets      150 - 350  K/uL 161    MPV      9.2 - 12.9 fL 9.6    Immature Granulocytes      0.0 - 0.5 % 0.4    Gran # (ANC)      1.8 - 7.7 K/uL 3.3    Immature Grans (Abs)      0.00 - 0.04 K/uL 0.02    Lymph #      1.0 - 4.8 K/uL 1.4    Mono #      0.3 - 1.0 K/uL 0.5    Eos #      0.0 - 0.5 K/uL 0.1    Baso #      0.00 - 0.20 K/uL 0.03    nRBC      0 /100 WBC 0    Gran%      38.0 - 73.0 % 60.9    Lymph%      18.0 - 48.0 % 26.1    Mono%      4.0 - 15.0 % 9.5    Eosinophil%      0.0 - 8.0 % 2.6    Basophil%      0.0 - 1.9 % 0.5    Differential Method       Automated    Sodium      136 - 145 mmol/L  138   Potassium      3.5 - 5.1 mmol/L  4.6   Chloride      95 - 110 mmol/L  109   CO2      23 - 29 mmol/L  23   Glucose      70 - 110 mg/dL  109   BUN, Bld      8 - 23 mg/dL  23   Creatinine      0.5 - 1.4 mg/dL  1.0   Calcium      8.7 - 10.5 mg/dL  9.6   PROTEIN TOTAL      6.0 - 8.4 g/dL  8.0   Albumin      3.5 - 5.2 g/dL  4.6   BILIRUBIN TOTAL      0.1 - 1.0 mg/dL  2.6 (H)   Alkaline Phosphatase      55 - 135 U/L  59   AST      10 - 40 U/L  15   ALT      10 - 44 U/L  14   Anion Gap      8 - 16 mmol/L  6 (L)   eGFR if African American      >60 mL/min/1.73 m:2  >60.0   eGFR if non African American      >60 mL/min/1.73 m:2  >60.0   Kappa Free Light Chains      0.33 - 1.94 mg/dL 2.51 (H)    Lambda Free Light Chains      0.57 - 2.63 mg/dL 1.17    Kappa/Lambda FLC Ratio      0.26 - 1.65 2.15 (H)    Beta-2 Microglobulin      0.0 - 2.5 ug/mL 3.2 (H)        Assessment:     1. Chronic anemia  2. Gallstones  3. H/o hepatitis C  4. Smoldering myeloma  5. B cell lymphoproliferative disorder  6. Essential HTN        Plan:     1. He had multiple gall stones, low haptoglobin, mild conjugated+ unconjugated hyperbilirubinemia, splenomegaly, reticulocytosis in 2013--all suggestive for a hemolytic process. He has never been transfused. No clear family h/o anemia. He denies pruritus, discolored urine, abdominal pain, fatigue now. He had normal hemoglobin  electrophoresis in 2013. [facility] screen was normal. JUDD was negative. LDH was normal. Haptoglobin was low. G6PD was normal.   B12, folate were normal. His most recent hemoglobin is 12.9 g/dl on 6/15/20 with normocytic picture. Bilirubin 2.6 on 6/15/20.   Haptoglobin was low, but LDH normal in Dec 2019. Serum iron saturation was normal. Cold agglutinin titer was normal/ negative. Reticulocyte was slightly elevated at 3.4%.  He has increased osmotic fragility-possibly suggestive of HS. He will continue folic acid daily.           3. He has been treated successfully.      4. He has IgG kappa monoclonal protein on SPEP. Most recent M spike ( 6/15/20) is 1.3 g/dl. sFLC ratio 2.15. He has normal Cr, calcium and mild anemia. 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 Bone marrow biopsy on 6/30/20 showed 10% kappa restricted plasma cells. He had normal male karyotype.  He has low risk smoldering myeloma . He has new onset right low back pain. No h/o trauma. No headaches/ s/s of neuropathy. I discussed the findings with him and explained that the current standard of care is observation. Risk of progression to myeloma is variable, can be upto 15% within the first three years since diagnosis, but decreases to 3% between years three and 10, and drops to 1% at 10 years post-diagnosis.    5. Identified on BM biopsy on 6/30/20. It appears that he has a SLL/CLL clone in addition to clonal population of plasma cells.       5. On Losartan, follows with .

## 2020-07-16 LAB
DNA/RNA EXTRACT AND HOLD RESULT: NORMAL
DNA/RNA EXTRACTION: NORMAL
EXHR SPECIMEN TYPE: NORMAL

## 2020-07-22 LAB
GENETICIST REVIEW: NORMAL
PLASMA CELL PROLIF RELEASED BY: NORMAL
PLASMA CELL PROLIF RESULT SUMMARY: NORMAL
PLASMA CELL PROLIF RESULT TABLE: NORMAL
REASON FOR REFERRAL, PLASMA CELL PROLIF (PCPD), FISH: NORMAL
REF LAB TEST METHOD: NORMAL
RESULTS, PLASMA CELL PROLIF (PCPD), FISH: NORMAL
SERVICE CMNT-IMP: NORMAL
SERVICE CMNT-IMP: NORMAL
SPECIMEN SOURCE: NORMAL
SPECIMEN, PLASMA CELL PROLIF (PCPD), FISH: NORMAL

## 2020-07-27 LAB
COMMENT: NORMAL
FINAL PATHOLOGIC DIAGNOSIS: NORMAL
GROSS: NORMAL
Lab: NORMAL
MICROSCOPIC EXAM: NORMAL
SUPPLEMENTAL DIAGNOSIS: NORMAL

## 2020-10-08 ENCOUNTER — LAB VISIT (OUTPATIENT)
Dept: LAB | Facility: HOSPITAL | Age: 65
End: 2020-10-08
Payer: COMMERCIAL

## 2020-10-08 ENCOUNTER — OFFICE VISIT (OUTPATIENT)
Dept: HEMATOLOGY/ONCOLOGY | Facility: CLINIC | Age: 65
End: 2020-10-08
Payer: COMMERCIAL

## 2020-10-08 VITALS
DIASTOLIC BLOOD PRESSURE: 67 MMHG | RESPIRATION RATE: 17 BRPM | HEIGHT: 68 IN | SYSTOLIC BLOOD PRESSURE: 155 MMHG | WEIGHT: 169.31 LBS | TEMPERATURE: 98 F | HEART RATE: 69 BPM | OXYGEN SATURATION: 100 % | BODY MASS INDEX: 25.66 KG/M2

## 2020-10-08 DIAGNOSIS — D47.Z9 LOW GRADE B CELL LYMPHOPROLIFERATIVE DISORDER: ICD-10-CM

## 2020-10-08 DIAGNOSIS — D64.9 ANEMIA OF UNKNOWN ETIOLOGY: ICD-10-CM

## 2020-10-08 DIAGNOSIS — D58.0 HEREDITARY SPHEROCYTIC HEMOLYTIC ANEMIA: Chronic | ICD-10-CM

## 2020-10-08 DIAGNOSIS — D47.2 SMOLDERING MYELOMA: Primary | ICD-10-CM

## 2020-10-08 DIAGNOSIS — R16.1 SPLENOMEGALY: ICD-10-CM

## 2020-10-08 DIAGNOSIS — I10 HYPERTENSION, ESSENTIAL: ICD-10-CM

## 2020-10-08 DIAGNOSIS — D47.2 SMOLDERING MYELOMA: ICD-10-CM

## 2020-10-08 LAB
ALBUMIN SERPL BCP-MCNC: 4.5 G/DL (ref 3.5–5.2)
ALP SERPL-CCNC: 58 U/L (ref 55–135)
ALT SERPL W/O P-5'-P-CCNC: 16 U/L (ref 10–44)
ANION GAP SERPL CALC-SCNC: 7 MMOL/L (ref 8–16)
AST SERPL-CCNC: 16 U/L (ref 10–40)
BASOPHILS # BLD AUTO: 0.03 K/UL (ref 0–0.2)
BASOPHILS NFR BLD: 0.5 % (ref 0–1.9)
BILIRUB SERPL-MCNC: 1.7 MG/DL (ref 0.1–1)
BUN SERPL-MCNC: 16 MG/DL (ref 8–23)
CALCIUM SERPL-MCNC: 9.1 MG/DL (ref 8.7–10.5)
CHLORIDE SERPL-SCNC: 110 MMOL/L (ref 95–110)
CO2 SERPL-SCNC: 23 MMOL/L (ref 23–29)
CREAT SERPL-MCNC: 0.8 MG/DL (ref 0.5–1.4)
DIFFERENTIAL METHOD: ABNORMAL
EOSINOPHIL # BLD AUTO: 0.1 K/UL (ref 0–0.5)
EOSINOPHIL NFR BLD: 1.4 % (ref 0–8)
ERYTHROCYTE [DISTWIDTH] IN BLOOD BY AUTOMATED COUNT: 14.3 % (ref 11.5–14.5)
EST. GFR  (AFRICAN AMERICAN): >60 ML/MIN/1.73 M^2
EST. GFR  (NON AFRICAN AMERICAN): >60 ML/MIN/1.73 M^2
GLUCOSE SERPL-MCNC: 100 MG/DL (ref 70–110)
HCT VFR BLD AUTO: 35.7 % (ref 40–54)
HGB BLD-MCNC: 12.5 G/DL (ref 14–18)
IMM GRANULOCYTES # BLD AUTO: 0.03 K/UL (ref 0–0.04)
IMM GRANULOCYTES NFR BLD AUTO: 0.5 % (ref 0–0.5)
LDH SERPL L TO P-CCNC: 121 U/L (ref 110–260)
LYMPHOCYTES # BLD AUTO: 1.4 K/UL (ref 1–4.8)
LYMPHOCYTES NFR BLD: 24.5 % (ref 18–48)
MCH RBC QN AUTO: 32 PG (ref 27–31)
MCHC RBC AUTO-ENTMCNC: 35 G/DL (ref 32–36)
MCV RBC AUTO: 91 FL (ref 82–98)
MONOCYTES # BLD AUTO: 0.4 K/UL (ref 0.3–1)
MONOCYTES NFR BLD: 6.8 % (ref 4–15)
NEUTROPHILS # BLD AUTO: 3.9 K/UL (ref 1.8–7.7)
NEUTROPHILS NFR BLD: 66.3 % (ref 38–73)
NRBC BLD-RTO: 0 /100 WBC
PLATELET # BLD AUTO: 168 K/UL (ref 150–350)
PMV BLD AUTO: 10.2 FL (ref 9.2–12.9)
POTASSIUM SERPL-SCNC: 4.8 MMOL/L (ref 3.5–5.1)
PROT SERPL-MCNC: 7.7 G/DL (ref 6–8.4)
RBC # BLD AUTO: 3.91 M/UL (ref 4.6–6.2)
RETICS/RBC NFR AUTO: 6.8 % (ref 0.4–2)
SODIUM SERPL-SCNC: 140 MMOL/L (ref 136–145)
WBC # BLD AUTO: 5.87 K/UL (ref 3.9–12.7)

## 2020-10-08 PROCEDURE — 85045 AUTOMATED RETICULOCYTE COUNT: CPT

## 2020-10-08 PROCEDURE — 3008F BODY MASS INDEX DOCD: CPT | Mod: CPTII,S$GLB,, | Performed by: INTERNAL MEDICINE

## 2020-10-08 PROCEDURE — 99214 PR OFFICE/OUTPT VISIT, EST, LEVL IV, 30-39 MIN: ICD-10-PCS | Mod: S$GLB,,, | Performed by: INTERNAL MEDICINE

## 2020-10-08 PROCEDURE — 99999 PR PBB SHADOW E&M-EST. PATIENT-LVL III: ICD-10-PCS | Mod: PBBFAC,,, | Performed by: INTERNAL MEDICINE

## 2020-10-08 PROCEDURE — 99214 OFFICE O/P EST MOD 30 MIN: CPT | Mod: S$GLB,,, | Performed by: INTERNAL MEDICINE

## 2020-10-08 PROCEDURE — 3077F PR MOST RECENT SYSTOLIC BLOOD PRESSURE >= 140 MM HG: ICD-10-PCS | Mod: CPTII,S$GLB,, | Performed by: INTERNAL MEDICINE

## 2020-10-08 PROCEDURE — 80053 COMPREHEN METABOLIC PANEL: CPT

## 2020-10-08 PROCEDURE — 3077F SYST BP >= 140 MM HG: CPT | Mod: CPTII,S$GLB,, | Performed by: INTERNAL MEDICINE

## 2020-10-08 PROCEDURE — 3078F DIAST BP <80 MM HG: CPT | Mod: CPTII,S$GLB,, | Performed by: INTERNAL MEDICINE

## 2020-10-08 PROCEDURE — 3008F PR BODY MASS INDEX (BMI) DOCUMENTED: ICD-10-PCS | Mod: CPTII,S$GLB,, | Performed by: INTERNAL MEDICINE

## 2020-10-08 PROCEDURE — 83615 LACTATE (LD) (LDH) ENZYME: CPT

## 2020-10-08 PROCEDURE — 36415 COLL VENOUS BLD VENIPUNCTURE: CPT

## 2020-10-08 PROCEDURE — 3078F PR MOST RECENT DIASTOLIC BLOOD PRESSURE < 80 MM HG: ICD-10-PCS | Mod: CPTII,S$GLB,, | Performed by: INTERNAL MEDICINE

## 2020-10-08 PROCEDURE — 85025 COMPLETE CBC W/AUTO DIFF WBC: CPT

## 2020-10-08 PROCEDURE — 99999 PR PBB SHADOW E&M-EST. PATIENT-LVL III: CPT | Mod: PBBFAC,,, | Performed by: INTERNAL MEDICINE

## 2020-10-08 NOTE — PROGRESS NOTES
CC: Hemolytic anemia, smoldering myeloma, B cell lymphoproliferative disorder, follow up     HPI: Mr. Sahni, 65, is here for a follow up visit for anemia. He has chronic hepatitis C, chronic anemia, gall stones, HTn.  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 He was last seen here by Dr.Archie Sahni in 2013. His CBC and other labs ( bilirubin, retic count) at that time suggested that he had hemolytic anemia or compensated hemolysis.  CBC from 11/2012 showed hemoglobin of 13.9 and  MCV was 89.  His indirect bilirubin has been elevated.  He had numerous gallstones and he has splenomegaly.  Hemoglobin electrophoresis was normal. G6PD was normal. JUDD was negative. Haptoglobin was low. Osmotic fragility was ordered, but was not done.   There is no family h/o anemia/ other hematologic condition.       He had a bone marrow biopsy on 6/30/20. There was a kappa light chain restricted plasma cell population (10% total cellularity) and a kappa light chain restricted B-cell population (<5% total cellularity).   The overall findings favor a plasma cell neoplasm and a concurrent B-cell lymphoproliferative disorder with chronic lymphocytic leukemia/small lymphocytic lymphoma immunophenotype.    Interval History: Denies yellowing of skin / eyes. No abdominal pain, fatigue, weight loss or change of appetite. No h/o discolored urine. He has back pain.         Review of Systems   Constitutional: Positive for malaise/fatigue. Negative for chills, fever and weight loss.   HENT: Negative for congestion, ear pain, hearing loss, nosebleeds and tinnitus.    Eyes: Negative for blurred vision, photophobia and discharge.   Respiratory: Negative for cough and sputum production.    Cardiovascular: Negative for palpitations, orthopnea, claudication and leg swelling.   Gastrointestinal: Negative for abdominal pain, blood in stool, constipation, diarrhea and nausea.   Genitourinary: Negative for dysuria, flank pain, frequency,  hematuria and urgency.   Musculoskeletal: Negative for back pain, myalgias and neck pain.   Neurological: Negative for dizziness, tremors, sensory change, focal weakness, seizures and headaches.   Endo/Heme/Allergies: Negative for environmental allergies. Does not bruise/bleed easily.   Psychiatric/Behavioral: Negative for depression, hallucinations and substance abuse.       Current Outpatient Medications   Medication Sig    betamethasone dipropionate (DIPROLENE) 0.05 % cream AAA ears and scalp bid prn    folic acid (FOLVITE) 1 MG tablet Take 1 mg by mouth once daily.    losartan (COZAAR) 100 MG tablet Take 1 tablet (100 mg total) by mouth once daily.    sildenafil (VIAGRA) 100 MG tablet Take 1 tablet (100 mg total) by mouth daily as needed for Erectile Dysfunction.     Current Facility-Administered Medications   Medication    triamcinolone acetonide injection 10 mg             Vitals:    10/08/20 1018   BP: (!) 155/67   Pulse: 69   Resp: 17   Temp: 97.9 °F (36.6 °C)       Physical Exam   Constitutional: He is oriented to person, place, and time. He appears well-developed.   HENT:   Head: Atraumatic.   Mouth/Throat: No oropharyngeal exudate.   Eyes: No scleral icterus.   Cardiovascular: Normal rate.   No murmur heard.  Pulmonary/Chest: Effort normal and breath sounds normal. No respiratory distress. He has no wheezes.   Abdominal: Soft. He exhibits no distension. There is no abdominal tenderness. There is no rebound.   Musculoskeletal:         General: No edema.   Lymphadenopathy:     He has no cervical adenopathy.   Neurological: He is alert and oriented to person, place, and time. No cranial nerve deficit.   Skin: Skin is warm.   Psychiatric: He has a normal mood and affect.         Ref Range & Units 6yr ago   Hgb A2 Quant 1.5 - 3.8 % 3.1    Hemoglobin Bands   Hb A and Hb A2    Hemoglobin Electrophoresis Interp   Normal       Component      Latest Ref Rng & Units 9/3/2013   Direct Lety (JUDD)       NEG    G6PD Quant      7.0 - 20.5 U/G Hg 13.9   Haptoglobin      30 - 250 mg/dL <10 (L)      12/18/19 serum SHAWN: IgG kappa specific monoclonal band in gamma.       12/18/19 osmotic fragility: Increased erythrocyte osmotic fragility. These results may be due to spherocytosis of any cause, such as hereditary spherocytosis, immune hemolytic anemia, or may reflect the presence of spherocytes that commonly are observed following blood transfusion. Some cases of congenital nonspherocytic hemolytic anemia may also be associated with increased osmotic fragility in this test. These include congenital hemolytic anemia due to pyruvate kinase   deficiency or glucose-6-phosphate dehydrogenase deficiency or unstable hemoglobin hemolytic anemia. Some medications may cause an abnormal osmotic fragility. Results must be   interpreted in the context of other clinical and laboratory data, including examination of close relatives (parents, siblings, children) to determine mode of inheritance,   results of Lety test, and erythrocyte morphology. EMA binding (band 3 fluorescence staining) flow cytometry testing is available as a component of the RBC Membrane Evaluation (RBCME).        Component      Latest Ref Rng & Units 6/15/2020   [place] Free Light Chains      0.33 - 1.94 mg/dL 2.40 (H)   [place] Free Light Chains      0.57 - 2.63 mg/dL 1.08   Kappa/Lambda FLC Ratio      0.26 - 1.65 2.22 (H)   IgG - Serum      650 - 1600 mg/dL 1812 (H)   IgA      40 - 350 mg/dL 82   IgM      50 - 300 mg/dL 118         6/15/20 SPEP :Normal total protein. Normal gamma globulins are decreased. There is a monoclonal protein peak in gamma measuring 1.30 g/dL, previously 1.18 g/dL.         6/30/20 BONE MARROW, LEFT ILIAC CREST (ASPIRATE SMEAR, TOUCH PREPARATION, CLOT SECTION,AND CORE BIOPSY):     -- PLASMA CELL NEOPLASM.  -- B-CELL LYMPHOPROLIFERATIVE DISORDER WITH CHRONIC LYMPHOCYTIC LEUKEMIA/SMALL  LYMPHOCYTIC LYMPHOMA IMMUNOPHENOTYPE.  -- NORMOCELLULAR MARROW  (40%) WITH TRILINEAGE HEMATOPOIESIS.  -- ADEQUATE IRON STORAGE.  -- SEE COMMENT.        Comments  Flow cytometric analysis of bone marrow detects a kappa light chain restricted plasma cell population that expresses , CD38, CD56 (dim), and CD45 (dim). It is negative for CD19 and CD20. In addition, there  is a kappa light chain restricted B lymphocyte population showing expression of CD19, CD20 (dim), CD5 (dim), and CD23 (partial). It is negative for CD38 and FMC-7. T lymphocytes are immunophenotypically  unremarkable. The blasts gate is not increased. Flow differential: Lymphocytes 9.7%, Monocytes 3.5%,Granulocytes 78.8%, Blast 2.5%, Debris/nRBC 5.6%, Viability 94.7%.  Immunohistochemical stains are performed on the biopsy core for greater sensitivity and further architectural.   There is a paratrabecular lymphoid aggregate containing predominantly CD20  positive B cells. The B-cells are positive for CD5, CD23, and LEF1; negative for cyclin D1 and SOX11.  Scattered CD3-positive T-cells are seen.  Taken together, there is a kappa light chain restricted plasma cell population (10% total cellularity) and a kappa light chain restricted B-cell population (<5% total cellularity). Although rare CLL/SLL with plasmacytic  differentiation has been described in literature, the overall findings favor a plasma cell neoplasm and a concurrent B-cell lymphoproliferative disorder with chronic lymphocytic leukemia/small lymphocytic  lymphoma immunophenotype. Correlation with clinical presentation and other laboratory results is required.sessment with adequate controls. -positive plasma cells comprise approximately  10% of the total cellularity.      Component      Latest Ref Rng & Units 6/30/2020   Chromosome analysis BM Result Summary       Normal   Interpretation       SEE BELOW   Results       46,XY[20]   Reason for Referral       D47.2  Monoclonal gammopathy   Specimen       Bone Marrow   Source       Test Not Performed    Method       Culture without mitogens   Banding Methods, BM Chromosome       SEE BELOW   Chromosome analysis BM Additional Information       Test Not Performed            Component      Latest Ref Rng & Units 10/8/2020   WBC      3.90 - 12.70 K/uL 5.87   RBC      4.60 - 6.20 M/uL 3.91 (L)   Hemoglobin      14.0 - 18.0 g/dL 12.5 (L)   Hematocrit      40.0 - 54.0 % 35.7 (L)   MCV      82 - 98 fL 91   MCH      27.0 - 31.0 pg 32.0 (H)   MCHC      32.0 - 36.0 g/dL 35.0   RDW      11.5 - 14.5 % 14.3   Platelets      150 - 350 K/uL 168   MPV      9.2 - 12.9 fL 10.2   Retic      0.4 - 2.0 % 6.8 (H)     Component      Latest Ref Rng & Units 10/8/2020   Sodium      136 - 145 mmol/L 140   Potassium      3.5 - 5.1 mmol/L 4.8   Chloride      95 - 110 mmol/L 110   CO2      23 - 29 mmol/L 23   Glucose      70 - 110 mg/dL 100   BUN, Bld      8 - 23 mg/dL 16   Creatinine      0.5 - 1.4 mg/dL 0.8   Calcium      8.7 - 10.5 mg/dL 9.1   PROTEIN TOTAL      6.0 - 8.4 g/dL 7.7   Albumin      3.5 - 5.2 g/dL 4.5   BILIRUBIN TOTAL      0.1 - 1.0 mg/dL 1.7 (H)   Alkaline Phosphatase      55 - 135 U/L 58   AST      10 - 40 U/L 16   ALT      10 - 44 U/L 16   Anion Gap      8 - 16 mmol/L 7 (L)   eGFR if African American      >60 mL/min/1.73 m:2 >60.0   eGFR if non African American      >60 mL/min/1.73 m:2 >60.0   LD      110 - 260 U/L 121     Assessment:     1. Chronic anemia  2. Gallstones  3. H/o hepatitis C  4. Smoldering myeloma  5. B cell lymphoproliferative disorder  6. Essential HTN        Plan:     1. He had multiple gall stones, low haptoglobin, mild conjugated+ unconjugated hyperbilirubinemia, splenomegaly, reticulocytosis in 2013--all suggestive for a hemolytic process. He has never been transfused. No clear family h/o anemia. He denies pruritus, discolored urine, abdominal pain, fatigue now. He had normal hemoglobin electrophoresis in 2013. [facility] screen was normal. JUDD was negative. LDH was normal. Haptoglobin was low. G6PD was  normal.   B12, folate were normal. Haptoglobin was low, but LDH normal in Dec 2019. Serum iron saturation was normal. Cold agglutinin titer was normal/ negative. Reticulocyte was slightly elevated at 3.4%.  He has increased osmotic fragility-possibly suggestive of HS.   Bilirubin 1.7 mg/dl on 10/8/20. LDH normal. He has mil;d normocytic anemia (Hgb 12.5g/dl) , with significant reticulocytosis (6.8%). He will continue folic acid daily.           3. He has been treated successfully.      4. He has IgG kappa monoclonal protein on SPEP. Most recent M spike ( 6/15/20) is 1.3 g/dl. sFLC ratio 2.15. He has normal Cr, calcium and mild anemia. Bone marrow biopsy on 6/30/20 showed 10% kappa restricted plasma cells. He had normal male karyotype. He has low risk smoldering myeloma . He has new onset right low back pain. No h/o trauma. No headaches/ s/s of neuropathy. I discussed the findings with him and explained that the current standard of care is observation. Risk of progression to myeloma is variable, can be upto 15% within the first three years since diagnosis, but decreases to 3% between years three and 10, and drops to 1% at 10 years post-diagnosis.  PET CT will be done if cleared by insurance.      5. Identified on BM biopsy on 6/30/20. It appears that he has a SLL/CLL clone in addition to clonal population of plasma cells.        5. On Losartan, follows with .

## 2020-10-08 NOTE — Clinical Note
Whole body pet ct in 1-2 wks  Cbc, cmp, ldh, retic, igg, igm, iga, spep, [id number], light chains and f/u in 3 months

## 2020-10-20 ENCOUNTER — HOSPITAL ENCOUNTER (OUTPATIENT)
Dept: RADIOLOGY | Facility: HOSPITAL | Age: 65
Discharge: HOME | End: 2020-10-20
Attending: INTERNAL MEDICINE
Payer: COMMERCIAL

## 2020-10-20 DIAGNOSIS — D47.Z9 LOW GRADE B CELL LYMPHOPROLIFERATIVE DISORDER: ICD-10-CM

## 2020-10-20 DIAGNOSIS — D47.2 SMOLDERING MYELOMA: ICD-10-CM

## 2020-10-20 LAB — POCT GLUCOSE: 90 MG/DL (ref 70–110)

## 2020-10-20 PROCEDURE — 78816 NM PET CT WHOLE BODY: ICD-10-PCS | Mod: 26,PS,, | Performed by: RADIOLOGY

## 2020-10-20 PROCEDURE — 78816 PET IMAGE W/CT FULL BODY: CPT | Mod: 26,PS,, | Performed by: RADIOLOGY

## 2020-10-20 PROCEDURE — 78816 PET IMAGE W/CT FULL BODY: CPT | Mod: TC

## 2020-11-18 DIAGNOSIS — Z12.5 PROSTATE CANCER SCREENING: ICD-10-CM

## 2020-11-18 DIAGNOSIS — Z00.00 ANNUAL PHYSICAL EXAM: Primary | ICD-10-CM

## 2020-11-18 DIAGNOSIS — E78.5 HYPERLIPIDEMIA, UNSPECIFIED HYPERLIPIDEMIA TYPE: ICD-10-CM

## 2020-11-18 NOTE — TELEPHONE ENCOUNTER
Care Due:                  Date            Visit Type   Department     Provider  --------------------------------------------------------------------------------                                MYCHART                              ANNUAL                              CHECKUP/PHY  [facility] INTERNAL  Last Visit: 11-      [facility]       LENARD MCKINNEY  Next Visit: None Scheduled  None         None Found                                                            Last  Test          Frequency    Reason                     Performed    Due Date  --------------------------------------------------------------------------------    Office Visit  12 months..  losartan, sildenafil.....  11- 11-    Powered by [employer]. Reference number: [id number]. 11/18/2020 2:00:56 PM   CST

## 2020-11-18 NOTE — TELEPHONE ENCOUNTER
----- Message from Gildaalon Peralta sent at 11/18/2020  1:55 PM CST -----  Contact: Self   Requesting an RX refill or new RX.  Is this a refill or new RX:   RX name and strength:losartan (COZAAR) 100 MG tablet    Route: Take 1 tablet (100 mg total) by mouth once daily. - Oral   Is this a 30 day or 90 day RX: 90  Pharmacy name and phone # (copy/paste from chart):  [id number] - [place] - [address] [phone] (Phone)  [phone] (Fax)      Comments:

## 2020-11-18 NOTE — TELEPHONE ENCOUNTER
----- Message from Gildaalon Peralta sent at 11/18/2020  1:55 PM CST -----  Contact: Self   Requesting an RX refill or new RX.  Is this a refill or new RX:   RX name and strength:losartan (COZAAR) 100 MG tablet    Route: Take 1 tablet (100 mg total) by mouth once daily. - Oral   Is this a 30 day or 90 day RX: 90  Pharmacy name and phone # (copy/paste from chart):  [id number] - [place] - [address] [phone] (Phone)  [phone] (Fax)      Comments:

## 2020-11-20 ENCOUNTER — TELEPHONE (OUTPATIENT)
Dept: HEMATOLOGY/ONCOLOGY | Facility: CLINIC | Age: 65
End: 2020-11-20

## 2020-11-20 NOTE — TELEPHONE ENCOUNTER
"----- Message from Nhi Cox sent at 11/20/2020  8:10 AM CST -----   Consult/Advisory:    Name Of Caller: Vicenta De Leon  w/[place] [facility] Officer   Contact Preference?: [phone] ext [phone]    Does patient feel the need to be seen today? No     What is the nature of the call?:  -request PET Ct results from 10/20/20 and office visit notes if additional treatment required fax to [phone]    Additional Notes:  "Thank you for all that you do for our patients'"    "

## 2020-11-21 RX ORDER — LOSARTAN POTASSIUM 100 MG/1
100 TABLET ORAL DAILY
Qty: 90 TABLET | Refills: 0 | Status: SHIPPED | OUTPATIENT
Start: 2020-11-21 | End: 2020-12-08 | Stop reason: SDUPTHER

## 2020-11-24 ENCOUNTER — PATIENT OUTREACH (OUTPATIENT)
Dept: ADMINISTRATIVE | Facility: HOSPITAL | Age: 65
End: 2020-11-24

## 2020-12-04 ENCOUNTER — LAB VISIT (OUTPATIENT)
Dept: LAB | Facility: HOSPITAL | Age: 65
End: 2020-12-04
Attending: FAMILY MEDICINE
Payer: COMMERCIAL

## 2020-12-04 DIAGNOSIS — Z12.5 PROSTATE CANCER SCREENING: ICD-10-CM

## 2020-12-04 DIAGNOSIS — E78.5 HYPERLIPIDEMIA, UNSPECIFIED HYPERLIPIDEMIA TYPE: ICD-10-CM

## 2020-12-04 LAB
CHOLEST SERPL-MCNC: 151 MG/DL (ref 120–199)
CHOLEST/HDLC SERPL: 3.6 {RATIO} (ref 2–5)
COMPLEXED PSA SERPL-MCNC: 1.4 NG/ML (ref 0–4)
HDLC SERPL-MCNC: 42 MG/DL (ref 40–75)
HDLC SERPL: 27.8 % (ref 20–50)
LDLC SERPL CALC-MCNC: 87.2 MG/DL (ref 63–159)
NONHDLC SERPL-MCNC: 109 MG/DL
TRIGL SERPL-MCNC: 109 MG/DL (ref 30–150)

## 2020-12-04 PROCEDURE — 80061 LIPID PANEL: CPT

## 2020-12-04 PROCEDURE — 84153 ASSAY OF PSA TOTAL: CPT

## 2020-12-04 PROCEDURE — 36415 COLL VENOUS BLD VENIPUNCTURE: CPT

## 2020-12-08 ENCOUNTER — PATIENT MESSAGE (OUTPATIENT)
Dept: PHARMACY | Facility: CLINIC | Age: 65
End: 2020-12-08

## 2020-12-08 ENCOUNTER — IMMUNIZATION (OUTPATIENT)
Dept: PHARMACY | Facility: CLINIC | Age: 65
End: 2020-12-08
Payer: COMMERCIAL

## 2020-12-08 ENCOUNTER — PATIENT OUTREACH (OUTPATIENT)
Dept: ADMINISTRATIVE | Facility: OTHER | Age: 65
End: 2020-12-08

## 2020-12-08 ENCOUNTER — OFFICE VISIT (OUTPATIENT)
Dept: INTERNAL MEDICINE | Facility: CLINIC | Age: 65
End: 2020-12-08
Attending: FAMILY MEDICINE
Payer: COMMERCIAL

## 2020-12-08 ENCOUNTER — IMMUNIZATION (OUTPATIENT)
Dept: PHARMACY | Facility: CLINIC | Age: 65
End: 2020-12-08

## 2020-12-08 DIAGNOSIS — D47.Z9 LOW GRADE B CELL LYMPHOPROLIFERATIVE DISORDER: ICD-10-CM

## 2020-12-08 DIAGNOSIS — D47.2 SMOLDERING MYELOMA: ICD-10-CM

## 2020-12-08 DIAGNOSIS — M54.50 LOW BACK PAIN WITHOUT SCIATICA, UNSPECIFIED BACK PAIN LATERALITY, UNSPECIFIED CHRONICITY: ICD-10-CM

## 2020-12-08 DIAGNOSIS — Z00.00 ANNUAL PHYSICAL EXAM: Primary | ICD-10-CM

## 2020-12-08 DIAGNOSIS — I10 HYPERTENSION, ESSENTIAL: ICD-10-CM

## 2020-12-08 DIAGNOSIS — Z86.19 HISTORY OF HEPATITIS C: ICD-10-CM

## 2020-12-08 DIAGNOSIS — Q23.1 BICUSPID AORTIC VALVE: ICD-10-CM

## 2020-12-08 PROCEDURE — 3074F PR MOST RECENT SYSTOLIC BLOOD PRESSURE < 130 MM HG: ICD-10-PCS | Mod: CPTII,S$GLB,, | Performed by: FAMILY MEDICINE

## 2020-12-08 PROCEDURE — 99396 PR PREVENTIVE VISIT,EST,40-64: ICD-10-PCS | Mod: S$GLB,,, | Performed by: FAMILY MEDICINE

## 2020-12-08 PROCEDURE — 99999 PR PBB SHADOW E&M-EST. PATIENT-LVL IV: ICD-10-PCS | Mod: PBBFAC,,, | Performed by: FAMILY MEDICINE

## 2020-12-08 PROCEDURE — 3078F DIAST BP <80 MM HG: CPT | Mod: CPTII,S$GLB,, | Performed by: FAMILY MEDICINE

## 2020-12-08 PROCEDURE — 3074F SYST BP LT 130 MM HG: CPT | Mod: CPTII,S$GLB,, | Performed by: FAMILY MEDICINE

## 2020-12-08 PROCEDURE — 3078F PR MOST RECENT DIASTOLIC BLOOD PRESSURE < 80 MM HG: ICD-10-PCS | Mod: CPTII,S$GLB,, | Performed by: FAMILY MEDICINE

## 2020-12-08 PROCEDURE — 99396 PREV VISIT EST AGE 40-64: CPT | Mod: S$GLB,,, | Performed by: FAMILY MEDICINE

## 2020-12-08 PROCEDURE — 99999 PR PBB SHADOW E&M-EST. PATIENT-LVL IV: CPT | Mod: PBBFAC,,, | Performed by: FAMILY MEDICINE

## 2020-12-08 RX ORDER — LOSARTAN POTASSIUM 100 MG/1
100 TABLET ORAL DAILY
Qty: 90 TABLET | Refills: 3 | Status: SHIPPED | OUTPATIENT
Start: 2020-12-08 | End: 2021-12-21

## 2020-12-08 NOTE — PROGRESS NOTES
Subjective:       Patient ID: Jose Sahni is a 65 y.o. male.    Chief Complaint: Annual Exam    Established patient for an annual wellness check/physical exam and also chronic disease management. Specific complaints - see dictation and please see ROS.  P, S, Fm, Soc Hx's; Meds, allergies reviewed and reconciled.  Health maintenance file reviewed and addressed items due.    BM Bx notes this summer and frequent f/u w/ HO.    Some concern for chronic back pain, right.  No radiation.  At least 8 months or so.  Has not seen a provider other than his hematologist for this.  No other constitutional complaints noted at this time.  I did review his recent blood chemistry, blood counts.    Review of Systems   Constitutional: Negative for chills, fatigue, fever and unexpected weight change.   HENT: Negative for congestion and trouble swallowing.    Eyes: Negative for redness and visual disturbance.   Respiratory: Negative for cough, chest tightness and shortness of breath.    Cardiovascular: Negative for chest pain, palpitations and leg swelling.   Gastrointestinal: Negative for abdominal pain and blood in stool.   Genitourinary: Negative for difficulty urinating and hematuria.   Musculoskeletal: Positive for back pain. Negative for arthralgias, gait problem, joint swelling, myalgias and neck pain.   Skin: Negative for color change and rash.   Neurological: Negative for tremors, speech difficulty, weakness, numbness and headaches.   Hematological: Negative for adenopathy. Does not bruise/bleed easily.   Psychiatric/Behavioral: Negative for behavioral problems, confusion and sleep disturbance. The patient is not nervous/anxious.        Objective:      Physical Exam  Vitals signs and nursing note reviewed.   Constitutional:       Appearance: He is well-developed. He is not diaphoretic.   Eyes:      General: No scleral icterus.  Neck:      Musculoskeletal: Normal range of motion and neck supple.      Thyroid: No thyromegaly.       Vascular: No carotid bruit or JVD.      Trachea: No tracheal deviation.   Cardiovascular:      Rate and Rhythm: Normal rate and regular rhythm.      Heart sounds: Murmur present. No friction rub. No gallop.    Pulmonary:      Effort: Pulmonary effort is normal. No respiratory distress.      Breath sounds: Normal breath sounds. No wheezing or rales.   Abdominal:      General: There is no distension.      Palpations: Abdomen is soft. There is no mass.      Tenderness: There is no abdominal tenderness. There is no guarding or rebound.   Lymphadenopathy:      Cervical: No cervical adenopathy.   Skin:     General: Skin is warm and dry.      Findings: No erythema or rash.   Neurological:      Mental Status: He is alert and oriented to person, place, and time.      Cranial Nerves: No cranial nerve deficit.      Motor: No tremor.      Coordination: Coordination normal.      Gait: Gait normal.   Psychiatric:         Behavior: Behavior normal.         Thought Content: Thought content normal.         Judgment: Judgment normal.         Assessment:       1. Annual physical exam    2. Hypertension, essential    3. Bicuspid aortic valve    4. Smoldering myeloma    5. Low grade B cell lymphoproliferative disorder    6. History of hepatitis C,s/p successful Rx w/ SVR24 (cure) - 6/2018    7. Low back pain without sciatica, unspecified back pain laterality, unspecified chronicity        Plan:     Medication List with Changes/Refills   Current Medications    BETAMETHASONE DIPROPIONATE (DIPROLENE) 0.05 % CREAM    AAA ears and scalp bid prn    FOLIC ACID (FOLVITE) 1 MG TABLET    Take 1 mg by mouth once daily.    SILDENAFIL (VIAGRA) 100 MG TABLET    Take 1 tablet (100 mg total) by mouth daily as needed for Erectile Dysfunction.   Changed and/or Refilled Medications    Modified Medication Previous Medication    LOSARTAN (COZAAR) 100 MG TABLET losartan (COZAAR) 100 MG tablet       Take 1 tablet (100 mg total) by mouth once daily.    Take 1  tablet (100 mg total) by mouth once daily.     Jose was seen today for annual exam.    Diagnoses and all orders for this visit:    Annual physical exam    Hypertension, essential    Bicuspid aortic valve  -     Ambulatory referral/consult to Cardiology; Future    Smoldering myeloma    Low grade B cell lymphoproliferative disorder    History of hepatitis C,s/p successful Rx w/ SVR24 (cure) - 6/2018    Low back pain without sciatica, unspecified back pain laterality, unspecified chronicity  -     Ambulatory referral/consult to [facility]; Future    Other orders  -     losartan (COZAAR) 100 MG tablet; Take 1 tablet (100 mg total) by mouth once daily.      See meds, orders, follow up, routing and instructions sections of encounter and AVS. Discussed with patient and provided on AVS.

## 2020-12-08 NOTE — PATIENT INSTRUCTIONS
TDAP vaccine today    Shingrix vaccine today.    Information about cholesterol, high blood pressure and healthy diet and activity recommendations can be found at the following links on the Internet:    http://www.nhlbi.nih.gov/health/health-topics/topics/hbc  http://www.nhlbi.nih.gov/health/educational/lose_wt/index.htm  Http://www.nhlbi.nih.gov/files/docs/public/heart/hbp_low.pdf  http://www.heart.org/HEARTORG/  http://diabetes.org/  https://www.cdc.gov/  Https://healthfinder.gov/  https://health.gov/dietaryguidelines/2015/guidelines/  https://health.gov/paguidelines/second-edition/pdf/Physical_Activity_Guidelines_2nd_edition.pdf

## 2020-12-09 ENCOUNTER — OFFICE VISIT (OUTPATIENT)
Dept: CARDIOLOGY | Facility: CLINIC | Age: 65
End: 2020-12-09
Attending: FAMILY MEDICINE
Payer: COMMERCIAL

## 2020-12-09 VITALS
BODY MASS INDEX: 25.76 KG/M2 | HEIGHT: 68 IN | WEIGHT: 170 LBS | HEART RATE: 73 BPM | DIASTOLIC BLOOD PRESSURE: 70 MMHG | SYSTOLIC BLOOD PRESSURE: 135 MMHG

## 2020-12-09 DIAGNOSIS — I10 HYPERTENSION, ESSENTIAL: ICD-10-CM

## 2020-12-09 DIAGNOSIS — Q24.9 CONGENITAL HEART ANOMALY: ICD-10-CM

## 2020-12-09 DIAGNOSIS — Q23.1 BICUSPID AORTIC VALVE: ICD-10-CM

## 2020-12-09 PROCEDURE — 99214 OFFICE O/P EST MOD 30 MIN: CPT | Mod: S$GLB,,, | Performed by: STUDENT IN AN ORGANIZED HEALTH CARE EDUCATION/TRAINING PROGRAM

## 2020-12-09 PROCEDURE — 3078F PR MOST RECENT DIASTOLIC BLOOD PRESSURE < 80 MM HG: ICD-10-PCS | Mod: CPTII,S$GLB,, | Performed by: STUDENT IN AN ORGANIZED HEALTH CARE EDUCATION/TRAINING PROGRAM

## 2020-12-09 PROCEDURE — 3075F SYST BP GE 130 - 139MM HG: CPT | Mod: CPTII,S$GLB,, | Performed by: STUDENT IN AN ORGANIZED HEALTH CARE EDUCATION/TRAINING PROGRAM

## 2020-12-09 PROCEDURE — 99999 PR PBB SHADOW E&M-EST. PATIENT-LVL IV: CPT | Mod: PBBFAC,,, | Performed by: STUDENT IN AN ORGANIZED HEALTH CARE EDUCATION/TRAINING PROGRAM

## 2020-12-09 PROCEDURE — 3078F DIAST BP <80 MM HG: CPT | Mod: CPTII,S$GLB,, | Performed by: STUDENT IN AN ORGANIZED HEALTH CARE EDUCATION/TRAINING PROGRAM

## 2020-12-09 PROCEDURE — 99999 PR PBB SHADOW E&M-EST. PATIENT-LVL IV: ICD-10-PCS | Mod: PBBFAC,,, | Performed by: STUDENT IN AN ORGANIZED HEALTH CARE EDUCATION/TRAINING PROGRAM

## 2020-12-09 PROCEDURE — 99214 PR OFFICE/OUTPT VISIT, EST, LEVL IV, 30-39 MIN: ICD-10-PCS | Mod: S$GLB,,, | Performed by: STUDENT IN AN ORGANIZED HEALTH CARE EDUCATION/TRAINING PROGRAM

## 2020-12-09 PROCEDURE — 3075F PR MOST RECENT SYSTOLIC BLOOD PRESS GE 130-139MM HG: ICD-10-PCS | Mod: CPTII,S$GLB,, | Performed by: STUDENT IN AN ORGANIZED HEALTH CARE EDUCATION/TRAINING PROGRAM

## 2020-12-09 NOTE — ASSESSMENT & PLAN NOTE
-   Echo results reviewed by Dr. Whitlock.  It has not changed from last year.  EF 45-50%.  Patient is also on amiodarone for PAF.    Date: 1/25/2017    Time of Call: 4:26 PM     Diagnosis:  CHF, PAF on Amiodarone     [ VORB ] Ordering provider: Dr. Whitlock  Order: start Losartan 50 mg daily.  BMP in 7-10 days.  Order TSH and PFT w/DLCO     Order received by: Rajani Stout RN       Follow-up/additional notes: Patient informed and verbalized understanding.  Rx for Losartan sent to local pharmacy and mail order per patient's request.  Labs ordered.  Lab appt made.  Patient is going to schedule her PFT (number given) when her cold subsides.  Patient states that she feels better but the cough is lingering and she is still tired.  Writer advised for her to see her primary care provider for her cough. Patient verbalized understanding and will hopefully make that appointment.

## 2020-12-09 NOTE — PROGRESS NOTES
Requested updates within Care Everywhere.  Patient's chart was reviewed for overdue ERIN topics.  Immunizations reconciled.

## 2020-12-09 NOTE — PROGRESS NOTES
PCP - Doug Lanza MD  Subjective:   Patient ID:  Jose Sahni is a 65 y.o. male who presents for  Evaluation of Bicuspid Aortic Valve.     HPI: Jose Sahni is a 66 yo M with PMHx bicuspid AV, HTN, HCV s/p Rx (6/2018), smoldering myeloma and B cell lymphoproliferative disorder who is referred by Dr Lanza for evaluation of bicuspid AV. Patient completed echocardiogram in 2/2018 that showed preserved LVEF 60-65% and bicuspid AV without stenosis or regurgitation. Patient reports being asymptomatic from cardiac standpoint - denies angina, shortness of breath, palpitations, orthopnea, pnd, peripheral swelling, near syncope/syncope. He is active, walks his dog ~ 2 miles per day without developing symptoms. He is a never smoker. Of note patient reports history of heart surgery during childhood (~ 1963) in [facility], records unavailable. He also reports FHx of premature CAD in his father who passed away from MI in his 40s.     Past Medical History:   Diagnosis Date    Allergy     Asymptomatic cholelithiasis     Heart murmur     History of hepatitis C,s/p successful Rx w/ SVR24 (cure) - 6/2018 10/18/2016    GT2, tx naive  2014 liver biopsy, grade 1 stage 1 9-2017 Fibroscan = F2   Completed Epclusa 12wks w/ SVR    Hypertension     Tuberculosis     Hx postive     Past Surgical History:   Procedure Laterality Date    CARDIAC SURGERY  1962    HAND SURGERY  2008    Left hand    HERNIA REPAIR  1967    orchiopexy, left  age 12     Social History     Tobacco Use    Smoking status: Never Smoker    Smokeless tobacco: Never Used   Substance Use Topics    Alcohol use: No     Alcohol/week: 7.0 standard drinks     Types: 7 Cans of beer per week     Family History   Problem Relation Age of Onset    Stroke Mother     Heart disease Father     Cancer Paternal Aunt     Cancer Paternal Uncle     Melanoma Neg Hx        Meds:     Review of patient's allergies indicates:   Allergen Reactions    Benazepril hcl Other (See  "Comments)     Coughing       Current Outpatient Medications:     betamethasone dipropionate (DIPROLENE) 0.05 % cream, AAA ears and scalp bid prn, Disp: 60 g, Rfl: 3    diphth,pertus,acell,,tetanus (BOOSTRIX TDAP) 2.5-8-5 Lf-mcg-Lf/0.5mL Syrg injection, Inject 0.5 mLs into the muscle once. For one dose. for 1 dose, Disp: 0.5 mL, Rfl: 0    folic acid (FOLVITE) 1 MG tablet, Take 1 mg by mouth once daily., Disp: , Rfl:     losartan (COZAAR) 100 MG tablet, Take 1 tablet (100 mg total) by mouth once daily., Disp: 90 tablet, Rfl: 3    sildenafil (VIAGRA) 100 MG tablet, Take 1 tablet (100 mg total) by mouth daily as needed for Erectile Dysfunction., Disp: 30 tablet, Rfl: 2    varicella-zoster gE-AS01B, PF, (SHINGRIX, PF,) 50 mcg/0.5 mL injection, Inject 0.5 mLs into the muscle once. for 1 dose, Disp: 1 each, Rfl: 1  No current facility-administered medications for this visit.       Review of Systems   Constitutional: Negative for chills, diaphoresis, fever, malaise/fatigue and weight loss.   HENT: Negative.    Eyes: Negative for blurred vision, double vision, pain and redness.   Respiratory: Negative for cough, shortness of breath and wheezing.    Cardiovascular: Negative for chest pain, palpitations, orthopnea, claudication, leg swelling and PND.   Gastrointestinal: Negative for abdominal pain, constipation, diarrhea, nausea and vomiting.   Genitourinary: Negative.    Musculoskeletal: Negative.    Skin: Negative.    Neurological: Negative for dizziness, focal weakness, seizures, loss of consciousness, weakness and headaches.   Endo/Heme/Allergies: Negative.    Psychiatric/Behavioral: Negative for depression. The patient is not nervous/anxious.        Objective:   /70   Pulse 73   Ht 5' 8" (1.727 m)   Wt 77.1 kg (169 lb 15.6 oz)   BMI 25.84 kg/m²   Physical Exam   Constitutional: He is oriented to person, place, and time and well-developed, well-nourished, and in no distress. No distress.   HENT:   Head: " Normocephalic and atraumatic.   Mouth/Throat: Oropharynx is clear and moist.   Eyes: Pupils are equal, round, and reactive to light. Conjunctivae and EOM are normal.   Neck: Normal range of motion. Neck supple. No JVD present.   Cardiovascular: Normal rate and regular rhythm. Exam reveals no gallop and no friction rub.   Murmur (1/6 systolic murmur over LLSB) heard.  Pulmonary/Chest: Effort normal and breath sounds normal. No respiratory distress. He has no wheezes. He has no rales. He exhibits no tenderness.   Abdominal: Soft. Bowel sounds are normal. He exhibits no distension. There is no abdominal tenderness.   Musculoskeletal: Normal range of motion.         General: No edema.   Neurological: He is alert and oriented to person, place, and time.   Skin: Skin is warm and dry. No erythema.   Psychiatric: Mood, memory, affect and judgment normal.       Labs:     Lab Results   Component Value Date     10/08/2020    K 4.8 10/08/2020     10/08/2020    CO2 23 10/08/2020    BUN 16 10/08/2020    CREATININE 0.8 10/08/2020    ANIONGAP 7 (L) 10/08/2020     No results found for: HGBA1C  No results found for: BNP, BNPTRIAGEBLO    Lab Results   Component Value Date    WBC 5.87 10/08/2020    HGB 12.5 (L) 10/08/2020    HCT 35.7 (L) 10/08/2020     10/08/2020    GRAN 3.9 10/08/2020    GRAN 66.3 10/08/2020     Lab Results   Component Value Date    CHOL 151 12/04/2020    HDL 42 12/04/2020    LDLCALC 87.2 12/04/2020    TRIG 109 12/04/2020       Lab Results   Component Value Date     10/08/2020    K 4.8 10/08/2020     10/08/2020    CO2 23 10/08/2020    BUN 16 10/08/2020    CREATININE 0.8 10/08/2020    ANIONGAP 7 (L) 10/08/2020     No results found for: HGBA1C  No results found for: BNP, BNPTRIAGEBLO Lab Results   Component Value Date    WBC 5.87 10/08/2020    HGB 12.5 (L) 10/08/2020    HCT 35.7 (L) 10/08/2020     10/08/2020    GRAN 3.9 10/08/2020    GRAN 66.3 10/08/2020     Lab Results   Component  Value Date    CHOL 151 12/04/2020    HDL 42 12/04/2020    LDLCALC 87.2 12/04/2020    TRIG 109 12/04/2020                Cardiovascular Imaging:   Echo 2/2018:     1 - Normal left ventricular systolic function (EF 60-65%).     2 - Mild left atrial enlargement.     3 - Bi-leaflet aortic valve without stenosis or regurgitation.     4 - Mild mitral regurgitation.     5 - Moderate pulmonic regurgitation.     Assessment & Plan:   Bicuspid aortic valve  Echo report from 12/2018 reviewed - bicuspid AV with no stenosis or regurgitation, LVEF preserved.   Patient is asymptomatic and denies cardiac symptoms. No significant AV murmur appreciated on today's visit. As a result no further testing or cardiac work up indicated at this time, will continue to monitor clinical signs/symptoms in follow up.     Congenital heart anomaly  [facility]   Attempt to obtain records, release form signed today.    Hypertension, essential  Recommend goal BP < 130/80 mmHg.   Continue with losartan 100 mg daily.   Managed by Dr Cordero.       RTC 1 year or sooner if symptoms    Signed:  Soraya Li M.D.  Cardiovascular Fellow PGY-6  [facility]

## 2020-12-10 NOTE — PROGRESS NOTES
I have reviewed the patient's chart and the fellow's history and physical, as well as discussed the case with the fellow. I have personally spoken with and examined the patient in the clinic. I agree with the findings, assessment, and plan.

## 2020-12-12 ENCOUNTER — PATIENT MESSAGE (OUTPATIENT)
Dept: CARDIOLOGY | Facility: CLINIC | Age: 65
End: 2020-12-12

## 2021-01-05 ENCOUNTER — OFFICE VISIT (OUTPATIENT)
Dept: SPINE | Facility: CLINIC | Age: 66
End: 2021-01-05
Payer: COMMERCIAL

## 2021-01-05 VITALS
DIASTOLIC BLOOD PRESSURE: 69 MMHG | WEIGHT: 170 LBS | HEIGHT: 68 IN | HEART RATE: 78 BPM | BODY MASS INDEX: 25.76 KG/M2 | SYSTOLIC BLOOD PRESSURE: 149 MMHG | TEMPERATURE: 98 F

## 2021-01-05 DIAGNOSIS — M54.9 DORSALGIA, UNSPECIFIED: ICD-10-CM

## 2021-01-05 DIAGNOSIS — M54.50 LOW BACK PAIN WITHOUT SCIATICA, UNSPECIFIED BACK PAIN LATERALITY, UNSPECIFIED CHRONICITY: ICD-10-CM

## 2021-01-05 PROCEDURE — 3078F PR MOST RECENT DIASTOLIC BLOOD PRESSURE < 80 MM HG: ICD-10-PCS | Mod: CPTII,S$GLB,, | Performed by: NURSE PRACTITIONER

## 2021-01-05 PROCEDURE — 3008F PR BODY MASS INDEX (BMI) DOCUMENTED: ICD-10-PCS | Mod: CPTII,S$GLB,, | Performed by: NURSE PRACTITIONER

## 2021-01-05 PROCEDURE — 1126F PR PAIN SEVERITY QUANTIFIED, NO PAIN PRESENT: ICD-10-PCS | Mod: S$GLB,,, | Performed by: NURSE PRACTITIONER

## 2021-01-05 PROCEDURE — 3077F SYST BP >= 140 MM HG: CPT | Mod: CPTII,S$GLB,, | Performed by: NURSE PRACTITIONER

## 2021-01-05 PROCEDURE — 1126F AMNT PAIN NOTED NONE PRSNT: CPT | Mod: S$GLB,,, | Performed by: NURSE PRACTITIONER

## 2021-01-05 PROCEDURE — 99214 PR OFFICE/OUTPT VISIT, EST, LEVL IV, 30-39 MIN: ICD-10-PCS | Mod: S$GLB,,, | Performed by: NURSE PRACTITIONER

## 2021-01-05 PROCEDURE — 99999 PR PBB SHADOW E&M-EST. PATIENT-LVL IV: ICD-10-PCS | Mod: PBBFAC,,, | Performed by: NURSE PRACTITIONER

## 2021-01-05 PROCEDURE — 3077F PR MOST RECENT SYSTOLIC BLOOD PRESSURE >= 140 MM HG: ICD-10-PCS | Mod: CPTII,S$GLB,, | Performed by: NURSE PRACTITIONER

## 2021-01-05 PROCEDURE — 99999 PR PBB SHADOW E&M-EST. PATIENT-LVL IV: CPT | Mod: PBBFAC,,, | Performed by: NURSE PRACTITIONER

## 2021-01-05 PROCEDURE — 99214 OFFICE O/P EST MOD 30 MIN: CPT | Mod: S$GLB,,, | Performed by: NURSE PRACTITIONER

## 2021-01-05 PROCEDURE — 3008F BODY MASS INDEX DOCD: CPT | Mod: CPTII,S$GLB,, | Performed by: NURSE PRACTITIONER

## 2021-01-05 PROCEDURE — 3078F DIAST BP <80 MM HG: CPT | Mod: CPTII,S$GLB,, | Performed by: NURSE PRACTITIONER

## 2021-01-27 ENCOUNTER — TELEPHONE (OUTPATIENT)
Dept: HEMATOLOGY/ONCOLOGY | Facility: CLINIC | Age: 66
End: 2021-01-27

## 2021-02-12 ENCOUNTER — LAB VISIT (OUTPATIENT)
Dept: LAB | Facility: HOSPITAL | Age: 66
End: 2021-02-12
Payer: MEDICARE

## 2021-02-12 ENCOUNTER — OFFICE VISIT (OUTPATIENT)
Dept: HEMATOLOGY/ONCOLOGY | Facility: CLINIC | Age: 66
End: 2021-02-12
Payer: MEDICARE

## 2021-02-12 VITALS
WEIGHT: 169.44 LBS | OXYGEN SATURATION: 100 % | SYSTOLIC BLOOD PRESSURE: 150 MMHG | HEART RATE: 71 BPM | HEIGHT: 68 IN | BODY MASS INDEX: 25.68 KG/M2 | DIASTOLIC BLOOD PRESSURE: 70 MMHG | RESPIRATION RATE: 16 BRPM

## 2021-02-12 DIAGNOSIS — D47.2 SMOLDERING MYELOMA: Primary | ICD-10-CM

## 2021-02-12 DIAGNOSIS — I10 HYPERTENSION, ESSENTIAL: ICD-10-CM

## 2021-02-12 DIAGNOSIS — D59.8 OTHER ACQUIRED HEMOLYTIC ANEMIAS: ICD-10-CM

## 2021-02-12 DIAGNOSIS — D47.2 SMOLDERING MYELOMA: ICD-10-CM

## 2021-02-12 DIAGNOSIS — D47.Z9 LOW GRADE B CELL LYMPHOPROLIFERATIVE DISORDER: ICD-10-CM

## 2021-02-12 DIAGNOSIS — R16.1 SPLENOMEGALY: ICD-10-CM

## 2021-02-12 DIAGNOSIS — D58.0 HEREDITARY SPHEROCYTIC HEMOLYTIC ANEMIA: Chronic | ICD-10-CM

## 2021-02-12 LAB
ALBUMIN SERPL BCP-MCNC: 4.3 G/DL (ref 3.5–5.2)
ALP SERPL-CCNC: 56 U/L (ref 55–135)
ALT SERPL W/O P-5'-P-CCNC: 16 U/L (ref 10–44)
ANION GAP SERPL CALC-SCNC: 5 MMOL/L (ref 8–16)
AST SERPL-CCNC: 16 U/L (ref 10–40)
B2 MICROGLOB SERPL-MCNC: 2.9 UG/ML (ref 0–2.5)
BASOPHILS # BLD AUTO: 0.05 K/UL (ref 0–0.2)
BASOPHILS NFR BLD: 0.6 % (ref 0–1.9)
BILIRUB SERPL-MCNC: 2.6 MG/DL (ref 0.1–1)
BUN SERPL-MCNC: 18 MG/DL (ref 8–23)
CALCIUM SERPL-MCNC: 8.9 MG/DL (ref 8.7–10.5)
CHLORIDE SERPL-SCNC: 109 MMOL/L (ref 95–110)
CO2 SERPL-SCNC: 26 MMOL/L (ref 23–29)
CREAT SERPL-MCNC: 1 MG/DL (ref 0.5–1.4)
DIFFERENTIAL METHOD: ABNORMAL
EOSINOPHIL # BLD AUTO: 0.1 K/UL (ref 0–0.5)
EOSINOPHIL NFR BLD: 0.9 % (ref 0–8)
ERYTHROCYTE [DISTWIDTH] IN BLOOD BY AUTOMATED COUNT: 13.9 % (ref 11.5–14.5)
EST. GFR  (AFRICAN AMERICAN): >60 ML/MIN/1.73 M^2
EST. GFR  (NON AFRICAN AMERICAN): >60 ML/MIN/1.73 M^2
GLUCOSE SERPL-MCNC: 97 MG/DL (ref 70–110)
HCT VFR BLD AUTO: 35 % (ref 40–54)
HGB BLD-MCNC: 12.3 G/DL (ref 14–18)
IGA SERPL-MCNC: 75 MG/DL (ref 40–350)
IGG SERPL-MCNC: 1726 MG/DL (ref 650–1600)
IGM SERPL-MCNC: 100 MG/DL (ref 50–300)
IMM GRANULOCYTES # BLD AUTO: 0.02 K/UL (ref 0–0.04)
IMM GRANULOCYTES NFR BLD AUTO: 0.3 % (ref 0–0.5)
LDH SERPL L TO P-CCNC: 137 U/L (ref 110–260)
LYMPHOCYTES # BLD AUTO: 1.5 K/UL (ref 1–4.8)
LYMPHOCYTES NFR BLD: 19.1 % (ref 18–48)
MCH RBC QN AUTO: 31.9 PG (ref 27–31)
MCHC RBC AUTO-ENTMCNC: 35.1 G/DL (ref 32–36)
MCV RBC AUTO: 91 FL (ref 82–98)
MONOCYTES # BLD AUTO: 0.6 K/UL (ref 0.3–1)
MONOCYTES NFR BLD: 7.7 % (ref 4–15)
NEUTROPHILS # BLD AUTO: 5.6 K/UL (ref 1.8–7.7)
NEUTROPHILS NFR BLD: 71.4 % (ref 38–73)
NRBC BLD-RTO: 0 /100 WBC
PLATELET # BLD AUTO: 178 K/UL (ref 150–350)
PMV BLD AUTO: 9.8 FL (ref 9.2–12.9)
POTASSIUM SERPL-SCNC: 4.4 MMOL/L (ref 3.5–5.1)
PROT SERPL-MCNC: 7.7 G/DL (ref 6–8.4)
RBC # BLD AUTO: 3.86 M/UL (ref 4.6–6.2)
RETICS/RBC NFR AUTO: 4.5 % (ref 0.4–2)
SODIUM SERPL-SCNC: 140 MMOL/L (ref 136–145)
WBC # BLD AUTO: 7.9 K/UL (ref 3.9–12.7)

## 2021-02-12 PROCEDURE — 99214 PR OFFICE/OUTPT VISIT, EST, LEVL IV, 30-39 MIN: ICD-10-PCS | Mod: S$PBB,,, | Performed by: INTERNAL MEDICINE

## 2021-02-12 PROCEDURE — 85045 AUTOMATED RETICULOCYTE COUNT: CPT

## 2021-02-12 PROCEDURE — 99999 PR PBB SHADOW E&M-EST. PATIENT-LVL III: CPT | Mod: PBBFAC,,, | Performed by: INTERNAL MEDICINE

## 2021-02-12 PROCEDURE — 99213 OFFICE O/P EST LOW 20 MIN: CPT | Mod: PBBFAC | Performed by: INTERNAL MEDICINE

## 2021-02-12 PROCEDURE — 84165 PATHOLOGIST INTERPRETATION SPE: ICD-10-PCS | Mod: 26,,, | Performed by: PATHOLOGY

## 2021-02-12 PROCEDURE — 99214 OFFICE O/P EST MOD 30 MIN: CPT | Mod: S$PBB,,, | Performed by: INTERNAL MEDICINE

## 2021-02-12 PROCEDURE — 82784 ASSAY IGA/IGD/IGG/IGM EACH: CPT | Mod: 59

## 2021-02-12 PROCEDURE — 36415 COLL VENOUS BLD VENIPUNCTURE: CPT

## 2021-02-12 PROCEDURE — 83520 IMMUNOASSAY QUANT NOS NONAB: CPT

## 2021-02-12 PROCEDURE — 84165 PROTEIN E-PHORESIS SERUM: CPT

## 2021-02-12 PROCEDURE — 80053 COMPREHEN METABOLIC PANEL: CPT

## 2021-02-12 PROCEDURE — 99999 PR PBB SHADOW E&M-EST. PATIENT-LVL III: ICD-10-PCS | Mod: PBBFAC,,, | Performed by: INTERNAL MEDICINE

## 2021-02-12 PROCEDURE — 82232 ASSAY OF BETA-2 PROTEIN: CPT

## 2021-02-12 PROCEDURE — 85025 COMPLETE CBC W/AUTO DIFF WBC: CPT

## 2021-02-12 PROCEDURE — 83615 LACTATE (LD) (LDH) ENZYME: CPT

## 2021-02-12 PROCEDURE — 84165 PROTEIN E-PHORESIS SERUM: CPT | Mod: 26,,, | Performed by: PATHOLOGY

## 2021-02-15 LAB
ALBUMIN SERPL ELPH-MCNC: 4.53 G/DL (ref 3.35–5.55)
ALPHA1 GLOB SERPL ELPH-MCNC: 0.29 G/DL (ref 0.17–0.41)
ALPHA2 GLOB SERPL ELPH-MCNC: 0.53 G/DL (ref 0.43–0.99)
B-GLOBULIN SERPL ELPH-MCNC: 0.51 G/DL (ref 0.5–1.1)
GAMMA GLOB SERPL ELPH-MCNC: 1.54 G/DL (ref 0.67–1.58)
KAPPA LC SER QL IA: 2.56 MG/DL (ref 0.33–1.94)
KAPPA LC/LAMBDA SER IA: 2.53 (ref 0.26–1.65)
LAMBDA LC SER QL IA: 1.01 MG/DL (ref 0.57–2.63)
PATHOLOGIST INTERPRETATION SPE: NORMAL
PROT SERPL-MCNC: 7.4 G/DL (ref 6–8.4)

## 2021-02-17 ENCOUNTER — IMMUNIZATION (OUTPATIENT)
Dept: PHARMACY | Facility: CLINIC | Age: 66
End: 2021-02-17
Payer: MEDICARE

## 2021-05-12 ENCOUNTER — LAB VISIT (OUTPATIENT)
Dept: LAB | Facility: HOSPITAL | Age: 66
End: 2021-05-12
Attending: INTERNAL MEDICINE
Payer: MEDICARE

## 2021-05-12 DIAGNOSIS — D58.0 HEREDITARY SPHEROCYTIC HEMOLYTIC ANEMIA: Chronic | ICD-10-CM

## 2021-05-12 DIAGNOSIS — D47.2 SMOLDERING MYELOMA: ICD-10-CM

## 2021-05-12 LAB
ALBUMIN SERPL BCP-MCNC: 4.2 G/DL (ref 3.5–5.2)
ALP SERPL-CCNC: 52 U/L (ref 55–135)
ALT SERPL W/O P-5'-P-CCNC: 11 U/L (ref 10–44)
ANION GAP SERPL CALC-SCNC: 5 MMOL/L (ref 8–16)
AST SERPL-CCNC: 15 U/L (ref 10–40)
BASOPHILS # BLD AUTO: 0.03 K/UL (ref 0–0.2)
BASOPHILS NFR BLD: 0.5 % (ref 0–1.9)
BILIRUB SERPL-MCNC: 1.9 MG/DL (ref 0.1–1)
BUN SERPL-MCNC: 15 MG/DL (ref 8–23)
CALCIUM SERPL-MCNC: 9.1 MG/DL (ref 8.7–10.5)
CHLORIDE SERPL-SCNC: 110 MMOL/L (ref 95–110)
CO2 SERPL-SCNC: 25 MMOL/L (ref 23–29)
CREAT SERPL-MCNC: 0.9 MG/DL (ref 0.5–1.4)
DIFFERENTIAL METHOD: ABNORMAL
EOSINOPHIL # BLD AUTO: 0.1 K/UL (ref 0–0.5)
EOSINOPHIL NFR BLD: 2.1 % (ref 0–8)
ERYTHROCYTE [DISTWIDTH] IN BLOOD BY AUTOMATED COUNT: 13.8 % (ref 11.5–14.5)
EST. GFR  (AFRICAN AMERICAN): >60 ML/MIN/1.73 M^2
EST. GFR  (NON AFRICAN AMERICAN): >60 ML/MIN/1.73 M^2
GLUCOSE SERPL-MCNC: 101 MG/DL (ref 70–110)
HCT VFR BLD AUTO: 33.1 % (ref 40–54)
HGB BLD-MCNC: 11.5 G/DL (ref 14–18)
IMM GRANULOCYTES # BLD AUTO: 0.03 K/UL (ref 0–0.04)
IMM GRANULOCYTES NFR BLD AUTO: 0.5 % (ref 0–0.5)
LDH SERPL L TO P-CCNC: 147 U/L (ref 110–260)
LYMPHOCYTES # BLD AUTO: 1.5 K/UL (ref 1–4.8)
LYMPHOCYTES NFR BLD: 25 % (ref 18–48)
MCH RBC QN AUTO: 32.3 PG (ref 27–31)
MCHC RBC AUTO-ENTMCNC: 34.7 G/DL (ref 32–36)
MCV RBC AUTO: 93 FL (ref 82–98)
MONOCYTES # BLD AUTO: 0.4 K/UL (ref 0.3–1)
MONOCYTES NFR BLD: 6 % (ref 4–15)
NEUTROPHILS # BLD AUTO: 4 K/UL (ref 1.8–7.7)
NEUTROPHILS NFR BLD: 65.9 % (ref 38–73)
NRBC BLD-RTO: 0 /100 WBC
PLATELET # BLD AUTO: 164 K/UL (ref 150–450)
PMV BLD AUTO: 10.5 FL (ref 9.2–12.9)
POTASSIUM SERPL-SCNC: 4.5 MMOL/L (ref 3.5–5.1)
PROT SERPL-MCNC: 7.6 G/DL (ref 6–8.4)
RBC # BLD AUTO: 3.56 M/UL (ref 4.6–6.2)
RETICS/RBC NFR AUTO: 4.7 % (ref 0.4–2)
SODIUM SERPL-SCNC: 140 MMOL/L (ref 136–145)
WBC # BLD AUTO: 6.13 K/UL (ref 3.9–12.7)

## 2021-05-12 PROCEDURE — 85025 COMPLETE CBC W/AUTO DIFF WBC: CPT | Performed by: INTERNAL MEDICINE

## 2021-05-12 PROCEDURE — 83615 LACTATE (LD) (LDH) ENZYME: CPT | Performed by: INTERNAL MEDICINE

## 2021-05-12 PROCEDURE — 85045 AUTOMATED RETICULOCYTE COUNT: CPT | Performed by: INTERNAL MEDICINE

## 2021-05-12 PROCEDURE — 36415 COLL VENOUS BLD VENIPUNCTURE: CPT | Performed by: INTERNAL MEDICINE

## 2021-05-12 PROCEDURE — 80053 COMPREHEN METABOLIC PANEL: CPT | Performed by: INTERNAL MEDICINE

## 2021-12-21 RX ORDER — LOSARTAN POTASSIUM 100 MG/1
TABLET ORAL
Qty: 90 TABLET | Refills: 0 | Status: SHIPPED | OUTPATIENT
Start: 2021-12-21 | End: 2022-02-03

## 2022-01-19 ENCOUNTER — PATIENT MESSAGE (OUTPATIENT)
Dept: ADMINISTRATIVE | Facility: HOSPITAL | Age: 67
End: 2022-01-19
Payer: MEDICARE

## 2022-02-03 ENCOUNTER — OFFICE VISIT (OUTPATIENT)
Dept: INTERNAL MEDICINE | Facility: CLINIC | Age: 67
End: 2022-02-03
Attending: FAMILY MEDICINE
Payer: MEDICARE

## 2022-02-03 ENCOUNTER — LAB VISIT (OUTPATIENT)
Dept: LAB | Facility: HOSPITAL | Age: 67
End: 2022-02-03
Attending: FAMILY MEDICINE
Payer: MEDICARE

## 2022-02-03 VITALS
OXYGEN SATURATION: 98 % | SYSTOLIC BLOOD PRESSURE: 136 MMHG | HEIGHT: 68 IN | BODY MASS INDEX: 24.98 KG/M2 | HEART RATE: 81 BPM | DIASTOLIC BLOOD PRESSURE: 62 MMHG | WEIGHT: 164.81 LBS

## 2022-02-03 DIAGNOSIS — Z00.00 ANNUAL PHYSICAL EXAM: ICD-10-CM

## 2022-02-03 DIAGNOSIS — Z00.00 ANNUAL PHYSICAL EXAM: Primary | ICD-10-CM

## 2022-02-03 DIAGNOSIS — D58.0 HEREDITARY SPHEROCYTIC HEMOLYTIC ANEMIA: Chronic | ICD-10-CM

## 2022-02-03 DIAGNOSIS — Z12.5 PROSTATE CANCER SCREENING: ICD-10-CM

## 2022-02-03 DIAGNOSIS — D59.8 OTHER ACQUIRED HEMOLYTIC ANEMIAS: ICD-10-CM

## 2022-02-03 DIAGNOSIS — D47.2 SMOLDERING MYELOMA: ICD-10-CM

## 2022-02-03 DIAGNOSIS — Z23 NEED FOR VACCINATION WITH 13-POLYVALENT PNEUMOCOCCAL CONJUGATE VACCINE: ICD-10-CM

## 2022-02-03 DIAGNOSIS — Q23.1 BICUSPID AORTIC VALVE: ICD-10-CM

## 2022-02-03 DIAGNOSIS — Z12.11 COLON CANCER SCREENING: ICD-10-CM

## 2022-02-03 DIAGNOSIS — I10 HYPERTENSION, ESSENTIAL: ICD-10-CM

## 2022-02-03 DIAGNOSIS — Q24.9 CONGENITAL HEART ANOMALY: ICD-10-CM

## 2022-02-03 DIAGNOSIS — E78.5 HYPERLIPIDEMIA, UNSPECIFIED HYPERLIPIDEMIA TYPE: ICD-10-CM

## 2022-02-03 LAB
CHOLEST SERPL-MCNC: 120 MG/DL (ref 120–199)
CHOLEST/HDLC SERPL: 3.3 {RATIO} (ref 2–5)
HDLC SERPL-MCNC: 36 MG/DL (ref 40–75)
HDLC SERPL: 30 % (ref 20–50)
LDLC SERPL CALC-MCNC: 50.8 MG/DL (ref 63–159)
NONHDLC SERPL-MCNC: 84 MG/DL
TRIGL SERPL-MCNC: 166 MG/DL (ref 30–150)

## 2022-02-03 PROCEDURE — 99214 OFFICE O/P EST MOD 30 MIN: CPT | Mod: PBBFAC,25 | Performed by: FAMILY MEDICINE

## 2022-02-03 PROCEDURE — 99214 OFFICE O/P EST MOD 30 MIN: CPT | Mod: S$PBB,,, | Performed by: FAMILY MEDICINE

## 2022-02-03 PROCEDURE — 80061 LIPID PANEL: CPT | Performed by: FAMILY MEDICINE

## 2022-02-03 PROCEDURE — 86769 SARS-COV-2 COVID-19 ANTIBODY: CPT | Performed by: FAMILY MEDICINE

## 2022-02-03 PROCEDURE — 84153 ASSAY OF PSA TOTAL: CPT | Performed by: FAMILY MEDICINE

## 2022-02-03 PROCEDURE — G0009 ADMIN PNEUMOCOCCAL VACCINE: HCPCS | Mod: PBBFAC

## 2022-02-03 PROCEDURE — 99999 PR PBB SHADOW E&M-EST. PATIENT-LVL IV: CPT | Mod: PBBFAC,,, | Performed by: FAMILY MEDICINE

## 2022-02-03 PROCEDURE — 99999 PR PBB SHADOW E&M-EST. PATIENT-LVL IV: ICD-10-PCS | Mod: PBBFAC,,, | Performed by: FAMILY MEDICINE

## 2022-02-03 PROCEDURE — 99214 PR OFFICE/OUTPT VISIT, EST, LEVL IV, 30-39 MIN: ICD-10-PCS | Mod: S$PBB,,, | Performed by: FAMILY MEDICINE

## 2022-02-03 RX ORDER — LOSARTAN POTASSIUM 100 MG/1
100 TABLET ORAL DAILY
Qty: 90 TABLET | Refills: 3 | Status: SHIPPED | OUTPATIENT
Start: 2022-02-03 | End: 2023-04-21

## 2022-02-03 NOTE — PROGRESS NOTES
Answers for HPI/ROS submitted by the patient on 2/1/2022  activity change: No  unexpected weight change: No  neck pain: No  hearing loss: No  rhinorrhea: No  trouble swallowing: No  eye discharge: No  visual disturbance: No  chest tightness: No  wheezing: No  chest pain: No  palpitations: No  blood in stool: No  constipation: No  vomiting: No  diarrhea: No  polydipsia: No  polyuria: No  difficulty urinating: No  urgency: No  hematuria: No  joint swelling: No  arthralgias: No  headaches: No  weakness: No  confusion: No  dysphoric mood: No    Subjective:       Patient ID: Jose Sahni is a 67 y.o. male.    Chief Complaint: Annual Exam    Established patient for an annual wellness check/physical exam and also chronic disease management. Specific complaints - see dictation and please see ROS.  P, S, Fm, Soc Hx's; Meds, allergies reviewed and reconciled.  Health maintenance file reviewed and addressed items due. Recent applicable lab, imaging and cardiovascular results reviewed.  Problem list items reviewed and modified or added entries (in the overview section) may not be transcribed into this encounter note due to note writer format.        Review of Systems   Constitutional: Negative for activity change, appetite change, chills, diaphoresis, fatigue, fever and unexpected weight change.   HENT: Negative for congestion, hearing loss, postnasal drip, rhinorrhea, sore throat and trouble swallowing.    Eyes: Negative for discharge and visual disturbance.   Respiratory: Negative for cough, choking, chest tightness, shortness of breath and wheezing.    Cardiovascular: Negative for chest pain, palpitations and leg swelling.   Gastrointestinal: Negative for abdominal distention, abdominal pain, blood in stool, constipation, diarrhea, nausea and vomiting.   Endocrine: Negative for polydipsia and polyuria.   Genitourinary: Negative for difficulty urinating, hematuria and urgency.   Musculoskeletal: Negative for arthralgias,  joint swelling, myalgias and neck pain.   Skin: Negative for rash.   Neurological: Negative for weakness, light-headedness and headaches.   Psychiatric/Behavioral: Negative for confusion and dysphoric mood.       Objective:      Physical Exam  Vitals and nursing note reviewed.   Constitutional:       Appearance: He is well-developed and well-nourished. He is not diaphoretic.   Eyes:      General: No scleral icterus.  Neck:      Thyroid: No thyromegaly.      Vascular: No carotid bruit or JVD.      Trachea: No tracheal deviation.   Cardiovascular:      Rate and Rhythm: Normal rate and regular rhythm.      Pulses: Intact distal pulses.      Heart sounds: Normal heart sounds. No murmur heard.  No friction rub. No gallop.    Pulmonary:      Effort: Pulmonary effort is normal. No respiratory distress.      Breath sounds: Normal breath sounds. No wheezing or rales.   Abdominal:      General: There is no distension.      Palpations: Abdomen is soft. There is no mass.      Tenderness: There is no abdominal tenderness. There is no guarding or rebound.   Musculoskeletal:         General: No edema.      Cervical back: Normal range of motion and neck supple.   Lymphadenopathy:      Cervical: No cervical adenopathy.   Skin:     General: Skin is warm and dry.      Findings: No erythema or rash.   Neurological:      Mental Status: He is alert and oriented to person, place, and time.      Cranial Nerves: No cranial nerve deficit.      Motor: No tremor.      Coordination: Coordination normal.      Gait: Gait normal.   Psychiatric:         Mood and Affect: Mood and affect normal.         Behavior: Behavior normal.         Thought Content: Thought content normal.         Judgment: Judgment normal.         Assessment:       1. Annual physical exam    2. Hypertension, essential    3. Hyperlipidemia, unspecified hyperlipidemia type    4. Colon cancer screening    5. Prostate cancer screening    6. Hereditary spherocytic hemolytic anemia     7. Smoldering myeloma    8. Bicuspid aortic valve    9. Congenital heart anomaly    10. Other acquired hemolytic anemias        Plan:     Medication List with Changes/Refills   Current Medications    BETAMETHASONE DIPROPIONATE (DIPROLENE) 0.05 % CREAM    AAA ears and scalp bid prn    FOLIC ACID (FOLVITE) 1 MG TABLET    Take 1 mg by mouth once daily.    SILDENAFIL (VIAGRA) 100 MG TABLET    Take 1 tablet (100 mg total) by mouth daily as needed for Erectile Dysfunction.   Changed and/or Refilled Medications    Modified Medication Previous Medication    LOSARTAN (COZAAR) 100 MG TABLET losartan (COZAAR) 100 MG tablet       Take 1 tablet (100 mg total) by mouth once daily.    TAKE 1 TABLET BY MOUTH EVERY DAY     Jose was seen today for annual exam.    Diagnoses and all orders for this visit:    Annual physical exam  -     Lipid Panel; Future  -     PSA, Screening; Future    Hypertension, essential  -     losartan (COZAAR) 100 MG tablet; Take 1 tablet (100 mg total) by mouth once daily.    Hyperlipidemia, unspecified hyperlipidemia type  Comments:  Discussed ASCVD risk score of 16 today.  Recommended statin medication.  Patient will consider this.  Orders:  -     Lipid Panel; Future    Colon cancer screening  -     Case Request Endoscopy: COLONOSCOPY    Prostate cancer screening  -     PSA, Screening; Future    Hereditary spherocytic hemolytic anemia  -     Ambulatory referral/consult to Hematology / Oncology; Future    Smoldering myeloma  -     Ambulatory referral/consult to Hematology / Oncology; Future    Bicuspid aortic valve  -     Ambulatory referral/consult to Cardiology; Future    Congenital heart anomaly  -     Ambulatory referral/consult to Cardiology; Future    Other acquired hemolytic anemias  -     Ambulatory referral/consult to Hematology / Oncology; Future    Other orders  -     COVID-19 (SARS CoV-2) IgG Antibody; Future      See meds, orders, follow up, routing and instructions sections of encounter and  AVS. Discussed with patient and provided on AVS.    Discussed diet and exercise and links provided on AVS for detailed information.    Detailed discussion concerning risks and benefits of COVID-19 vaccination and consequences of un vaccinated status including potential accelerated risk of death.  Patient states he did have a febrile illness about 2 weeks ago.  Has fully recovered.  Rather mild.  He requests antibodies, I discussed that we can not do this but there are limitations into the information it provider is

## 2022-02-03 NOTE — PATIENT INSTRUCTIONS
Pneumococcal 13 vaccine today    If not contacted in a couple weeks by colonoscopy scheduling department - call Colonoscopy Scheduling Number - [phone].    Information about cholesterol, high blood pressure and healthy diet and activity recommendations can be found at the following links on the Internet:    http://www.nhlbi.nih.gov/health/health-topics/topics/hbc  http://www.nhlbi.nih.gov/health/educational/lose_wt/index.htm  Http://www.nhlbi.nih.gov/files/docs/public/heart/hbp_low.pdf  http://www.heart.org/HEARTORG/  http://diabetes.org/  https://www.cdc.gov/  Https://healthfinder.gov/  https://health.gov/dietaryguidelines/2015/guidelines/  https://health.gov/paguidelines/second-edition/pdf/Physical_Activity_Guidelines_2nd_edition.pdf

## 2022-02-04 ENCOUNTER — TELEPHONE (OUTPATIENT)
Dept: INTERNAL MEDICINE | Facility: CLINIC | Age: 67
End: 2022-02-04
Payer: MEDICARE

## 2022-02-04 DIAGNOSIS — R97.20 RISING PSA LEVEL: Primary | ICD-10-CM

## 2022-02-04 LAB
COMPLEXED PSA SERPL-MCNC: 2.5 NG/ML (ref 0–4)
SARS-COV-2 IGG SERPL IA-ACNC: <50 AU/ML
SARS-COV-2 IGG SERPL QL IA: NEGATIVE

## 2022-02-04 NOTE — TELEPHONE ENCOUNTER
Please call patient and explain that the tests show slight rise in the PSA, prostate level.    I would like to refer patient to the urology department for further evaluation and treatment.    Please see referral orders and please call patient to schedule.     Thank you.

## 2022-02-07 ENCOUNTER — TELEPHONE (OUTPATIENT)
Dept: CARDIOLOGY | Facility: CLINIC | Age: 67
End: 2022-02-07
Payer: MEDICARE

## 2022-02-07 ENCOUNTER — PATIENT OUTREACH (OUTPATIENT)
Dept: ADMINISTRATIVE | Facility: OTHER | Age: 67
End: 2022-02-07
Payer: MEDICARE

## 2022-02-07 NOTE — PROGRESS NOTES
LINKS immunization registry updated  Care Everywhere updated  Health Maintenance updated  Chart reviewed for overdue Proactive [facility] (ERIN) health maintenance testing (CRS, Breast Ca, Diabetic Eye Exam)   Orders entered:N/A  Colonoscopy scheduled for 2/17/22

## 2022-02-08 ENCOUNTER — OFFICE VISIT (OUTPATIENT)
Dept: UROLOGY | Facility: CLINIC | Age: 67
End: 2022-02-08
Attending: FAMILY MEDICINE
Payer: MEDICARE

## 2022-02-08 VITALS
BODY MASS INDEX: 25.03 KG/M2 | WEIGHT: 165.13 LBS | HEIGHT: 68 IN | DIASTOLIC BLOOD PRESSURE: 62 MMHG | SYSTOLIC BLOOD PRESSURE: 126 MMHG | HEART RATE: 82 BPM

## 2022-02-08 DIAGNOSIS — N40.1 BPH WITH OBSTRUCTION/LOWER URINARY TRACT SYMPTOMS: Primary | ICD-10-CM

## 2022-02-08 DIAGNOSIS — R97.20 RISING PSA LEVEL: ICD-10-CM

## 2022-02-08 DIAGNOSIS — N13.8 BPH WITH OBSTRUCTION/LOWER URINARY TRACT SYMPTOMS: Primary | ICD-10-CM

## 2022-02-08 PROCEDURE — 99204 PR OFFICE/OUTPT VISIT, NEW, LEVL IV, 45-59 MIN: ICD-10-PCS | Mod: S$PBB,,, | Performed by: NURSE PRACTITIONER

## 2022-02-08 PROCEDURE — 99999 PR PBB SHADOW E&M-EST. PATIENT-LVL III: ICD-10-PCS | Mod: PBBFAC,,, | Performed by: NURSE PRACTITIONER

## 2022-02-08 PROCEDURE — 99999 PR PBB SHADOW E&M-EST. PATIENT-LVL III: CPT | Mod: PBBFAC,,, | Performed by: NURSE PRACTITIONER

## 2022-02-08 PROCEDURE — 99204 OFFICE O/P NEW MOD 45 MIN: CPT | Mod: S$PBB,,, | Performed by: NURSE PRACTITIONER

## 2022-02-08 PROCEDURE — 99213 OFFICE O/P EST LOW 20 MIN: CPT | Mod: PBBFAC | Performed by: NURSE PRACTITIONER

## 2022-02-08 NOTE — PROGRESS NOTES
CHIEF COMPLAINT:    Mr. Sahni is a 67 y.o. male presenting for   Chief Complaint   Patient presents with    Elevated PSA       PRESENTING ILLNESS:    Jose Sahni is a 67 y.o. male with a PMH of htn, hx hep C (SVR24), smoldering myeloma who presents for rising PSA.    New patient to urology clinic. Presents today due to rising PSA. Had PSA drawn 2/2022 2.5 previous year 1.4.  Had recent illness with fever.  Discussed factors that can elevate PSA values including prostate cancer, infections, BPH, trauma or manipulation of the prostate, ejaculation within a few days of blood work, age.  Paternal cousin with prostate cancer.      He reports a good urinary stream and complete bladder emptying.  Reports having nocturia 3-4 per night.  Also having some hesitancy during the day.    Has history of erectile dysfunction.  Prescribed viagra, but does not use it.  Currently not an issue for him.    REVIEW OF SYSTEMS:    Review of Systems   Constitutional: Negative for chills and fever.   Respiratory: Negative for shortness of breath.    Cardiovascular: Negative for chest pain.   Gastrointestinal: Negative for constipation and diarrhea.   Genitourinary: Negative for dysuria, flank pain, frequency, hematuria and urgency.   Neurological: Negative for dizziness and weakness.       PATIENT HISTORY:    Past Medical History:   Diagnosis Date    Allergy     Asymptomatic cholelithiasis     Heart murmur     History of hepatitis C,s/p successful Rx w/ SVR24 (cure) - 6/2018 10/18/2016    GT2, tx naive  2014 liver biopsy, grade 1 stage 1 9-2017 Fibroscan = F2   Completed Epclusa 12wks w/ SVR    Hypertension     Tuberculosis     Hx postive       Family History   Problem Relation Age of Onset    Stroke Mother     Heart disease Father     Cancer Paternal Aunt     Cancer Paternal Uncle     Melanoma Neg Hx        Allergies:  Benazepril hcl    Medications:    Current Outpatient Medications:     betamethasone dipropionate (DIPROLENE)  0.05 % cream, AAA ears and scalp bid prn, Disp: 60 g, Rfl: 3    folic acid (FOLVITE) 1 MG tablet, Take 1 mg by mouth once daily., Disp: , Rfl:     losartan (COZAAR) 100 MG tablet, Take 1 tablet (100 mg total) by mouth once daily., Disp: 90 tablet, Rfl: 3    sildenafil (VIAGRA) 100 MG tablet, Take 1 tablet (100 mg total) by mouth daily as needed for Erectile Dysfunction., Disp: 30 tablet, Rfl: 2    PHYSICAL EXAMINATION:    Physical Exam  Vitals and nursing note reviewed.   Constitutional:       Appearance: Normal appearance. He is well-developed.   HENT:      Head: Normocephalic and atraumatic.   Eyes:      Pupils: Pupils are equal, round, and reactive to light.   Pulmonary:      Effort: Pulmonary effort is normal.   Genitourinary:     Prostate: Enlarged. Not tender.      Rectum: External hemorrhoid present.      Comments: Prostate smooth, no nodules, approximately 40 gm  Musculoskeletal:         General: Normal range of motion.      Cervical back: Normal range of motion.   Skin:     General: Skin is warm and dry.   Neurological:      Mental Status: He is alert and oriented to person, place, and time.   Psychiatric:         Mood and Affect: Mood and affect normal.         Behavior: Behavior normal.           LABS:    Lab Results   Component Value Date    PSA 2.5 02/03/2022    PSA 1.4 12/04/2020    PSA 1.4 11/13/2019    PSA 1.1 05/01/2018    PSA 1.5 01/25/2018    PSA 0.98 01/26/2017    PSA 0.89 09/23/2015    PSA 0.69 08/15/2013    PSA 0.61 07/11/2011       IMPRESSION:  Encounter Diagnoses   Name Primary?    Rising PSA level          PLAN:  Problem List Items Addressed This Visit    None     Visit Diagnoses     Rising PSA level        Relevant Orders    PROSTATE SPECIFIC ANTIGEN, DIAGNOSTIC          1. Rising PSA   - PSA 2.5 (2022) <-1.4 (2021)   - repeat PSA in 1 mth, recommended to abstain from ejaculation or intercourse prior to lab  2. bph with luts   - discussed treatment with medication, not bothersome enough  at this time to patient  3.  rtc based on lab    Kaitlin Andrade NP

## 2022-02-14 NOTE — PROGRESS NOTES
Subjective:       Patient ID: Jose Sahni is a 67 y.o. male.    Chief Complaint: No chief complaint on file.    HPI     67 year old male returns for follow-up of bicuspid aortic valve found on TTE in 2018 without aortopathy. He also has HTN, HCV s/p Rx (6/2018), smoldering myeloma and B cell lymphoproliferative disorder. He was born with congenital heart disease, which he had corrective surgery for at age 7, he does not know the name of the hospital where he had his surgery. There was apparently an attempt to obtain his records (from a few hospitals that he thought may have had his records) but we do not have them.    He reports feeling well overall. He denies chest pain/pressure/tightness/discomfort, dyspnea on exertion, orthopnea, PND, peripheral edema, palpitations, syncope or claudication.    He has never smoked, drinks a six pack twice weekly (advised to limit this to two drinks/day). No family history of CAD.    He walks his dog 3 miles per day.    2018 TTE    CONCLUSIONS     1 - Normal left ventricular systolic function (EF 60-65%).     2 - Mild left atrial enlargement.     3 - Bi-leaflet aortic valve without stenosis or regurgitation.     4 - Mild mitral regurgitation.     5 - Moderate pulmonic regurgitation.     Review of Systems   Constitutional: Negative for chills and fever.   HENT: Negative for nosebleeds.    Eyes: Negative for visual disturbance.   Respiratory: Negative for chest tightness and shortness of breath.    Cardiovascular: Negative for chest pain, palpitations and leg swelling.   Gastrointestinal: Negative for abdominal pain and blood in stool.   Genitourinary: Negative for hematuria.   Musculoskeletal: Negative for gait problem.   Skin: Negative for rash.   Neurological: Negative for syncope.       Objective:       Vitals:    02/17/22 1501   BP: (!) 146/66   Pulse: 78       Physical Exam  Constitutional:       Appearance: He is well-developed and well-nourished. He is not diaphoretic.    HENT:      Head: Normocephalic and atraumatic.   Eyes:      Extraocular Movements: EOM normal.      Pupils: Pupils are equal, round, and reactive to light.   Neck:      Vascular: No carotid bruit or JVD.   Cardiovascular:      Rate and Rhythm: Normal rate and regular rhythm.      Pulses: Intact distal pulses.           Radial pulses are 2+ on the right side and 2+ on the left side.        Dorsalis pedis pulses are 2+ on the right side and 2+ on the left side.        Posterior tibial pulses are 2+ on the right side and 2+ on the left side.      Heart sounds: Heart sounds not distant. Murmur (Grade II/VI systolic murmur loudest at the left sternal border) heard.   No friction rub. No gallop. No S3 or S4 sounds.    Pulmonary:      Effort: Pulmonary effort is normal. No respiratory distress.      Breath sounds: Normal breath sounds. No wheezing.   Abdominal:      General: Bowel sounds are normal. There is no distension.      Palpations: Abdomen is soft.      Tenderness: There is no abdominal tenderness.   Musculoskeletal:         General: No swelling.      Cervical back: Normal range of motion.   Skin:     General: Skin is warm.      Findings: No erythema.   Neurological:      Mental Status: He is alert and oriented to person, place, and time.   Psychiatric:         Mood and Affect: Mood and affect normal.         Behavior: Behavior normal.         Assessment:       1. Bicuspid aortic valve    2. Congenital heart anomaly    3. Hypertension, essential    4. History of hepatitis C,s/p successful Rx w/ SVR24 (cure) - 6/2018    5. Smoldering myeloma    6. Low grade B cell lymphoproliferative disorder        Plan:       Bicuspid aortic valve/congenital cardiac anomaly  Repeat TTE, will check aortic size    HTN  146/66  He was advised to purchase a home BP cuff and counseled on how to measure BP accurately  Keep BP log for all future visits  Do not exceed 2 alcoholic beverages per day  Advised regarding low sodium  diet    Hepatitis C s/p treatment  Stable    Smoldering myeloma, low grade B cell lymphoproliferative disorder  Followed by Heme-Onc

## 2022-02-17 ENCOUNTER — OFFICE VISIT (OUTPATIENT)
Dept: CARDIOLOGY | Facility: CLINIC | Age: 67
End: 2022-02-17
Payer: MEDICARE

## 2022-02-17 VITALS
SYSTOLIC BLOOD PRESSURE: 146 MMHG | WEIGHT: 166.88 LBS | BODY MASS INDEX: 25.29 KG/M2 | HEART RATE: 78 BPM | DIASTOLIC BLOOD PRESSURE: 66 MMHG | HEIGHT: 68 IN

## 2022-02-17 DIAGNOSIS — Z86.19 HISTORY OF HEPATITIS C: ICD-10-CM

## 2022-02-17 DIAGNOSIS — Q23.1 BICUSPID AORTIC VALVE: Primary | ICD-10-CM

## 2022-02-17 DIAGNOSIS — I10 HYPERTENSION, ESSENTIAL: ICD-10-CM

## 2022-02-17 DIAGNOSIS — D47.2 SMOLDERING MYELOMA: ICD-10-CM

## 2022-02-17 DIAGNOSIS — D47.Z9 LOW GRADE B CELL LYMPHOPROLIFERATIVE DISORDER: ICD-10-CM

## 2022-02-17 DIAGNOSIS — Q24.9 CONGENITAL HEART ANOMALY: ICD-10-CM

## 2022-02-17 PROCEDURE — 99214 PR OFFICE/OUTPT VISIT, EST, LEVL IV, 30-39 MIN: ICD-10-PCS | Mod: S$PBB,,, | Performed by: INTERNAL MEDICINE

## 2022-02-17 PROCEDURE — 99999 PR PBB SHADOW E&M-EST. PATIENT-LVL III: ICD-10-PCS | Mod: PBBFAC,,, | Performed by: INTERNAL MEDICINE

## 2022-02-17 PROCEDURE — 99213 OFFICE O/P EST LOW 20 MIN: CPT | Mod: PBBFAC | Performed by: INTERNAL MEDICINE

## 2022-02-17 PROCEDURE — 99214 OFFICE O/P EST MOD 30 MIN: CPT | Mod: S$PBB,,, | Performed by: INTERNAL MEDICINE

## 2022-02-17 PROCEDURE — 99999 PR PBB SHADOW E&M-EST. PATIENT-LVL III: CPT | Mod: PBBFAC,,, | Performed by: INTERNAL MEDICINE

## 2022-02-18 ENCOUNTER — HOSPITAL ENCOUNTER (OUTPATIENT)
Dept: CARDIOLOGY | Facility: HOSPITAL | Age: 67
Discharge: HOME | End: 2022-02-18
Attending: INTERNAL MEDICINE
Payer: MEDICARE

## 2022-02-18 VITALS
DIASTOLIC BLOOD PRESSURE: 68 MMHG | WEIGHT: 166 LBS | SYSTOLIC BLOOD PRESSURE: 143 MMHG | BODY MASS INDEX: 25.16 KG/M2 | HEART RATE: 73 BPM | HEIGHT: 68 IN

## 2022-02-18 DIAGNOSIS — Q23.1 BICUSPID AORTIC VALVE: ICD-10-CM

## 2022-02-18 LAB
ASCENDING AORTA: 2.59 CM
AV INDEX (PROSTH): 0.67
AV MEAN GRADIENT: 3 MMHG
AV PEAK GRADIENT: 4 MMHG
AV VALVE AREA: 3.63 CM2
AV VELOCITY RATIO: 0.72
BSA FOR ECHO PROCEDURE: 1.9 M2
CV ECHO LV RWT: 0.45 CM
DOP CALC AO PEAK VEL: 1.01 M/S
DOP CALC AO VTI: 20.32 CM
DOP CALC LVOT AREA: 5.4 CM2
DOP CALC LVOT DIAMETER: 2.63 CM
DOP CALC LVOT PEAK VEL: 0.73 M/S
DOP CALC LVOT STROKE VOLUME: 73.74 CM3
DOP CALCLVOT PEAK VEL VTI: 13.58 CM
E WAVE DECELERATION TIME: 168.54 MSEC
E/A RATIO: 0.94
E/E' RATIO: 7.16 M/S
ECHO LV POSTERIOR WALL: 0.98 CM (ref 0.6–1.1)
EJECTION FRACTION: 68 %
FRACTIONAL SHORTENING: 34 % (ref 28–44)
INTERVENTRICULAR SEPTUM: 1.08 CM (ref 0.6–1.1)
IVRT: 102.76 MSEC
LA MAJOR: 5.83 CM
LA MINOR: 5.65 CM
LA WIDTH: 4.26 CM
LEFT ATRIUM SIZE: 3.47 CM
LEFT ATRIUM VOLUME INDEX MOD: 32.7 ML/M2
LEFT ATRIUM VOLUME INDEX: 38.2 ML/M2
LEFT ATRIUM VOLUME MOD: 61.86 CM3
LEFT ATRIUM VOLUME: 72.1 CM3
LEFT INTERNAL DIMENSION IN SYSTOLE: 2.86 CM (ref 2.1–4)
LEFT VENTRICLE DIASTOLIC VOLUME INDEX: 45.18 ML/M2
LEFT VENTRICLE DIASTOLIC VOLUME: 85.39 ML
LEFT VENTRICLE MASS INDEX: 80 G/M2
LEFT VENTRICLE SYSTOLIC VOLUME INDEX: 16.5 ML/M2
LEFT VENTRICLE SYSTOLIC VOLUME: 31.1 ML
LEFT VENTRICULAR INTERNAL DIMENSION IN DIASTOLE: 4.35 CM (ref 3.5–6)
LEFT VENTRICULAR MASS: 151.24 G
LV LATERAL E/E' RATIO: 6.8 M/S
LV SEPTAL E/E' RATIO: 7.56 M/S
MV PEAK A VEL: 0.72 M/S
MV PEAK E VEL: 0.68 M/S
MV STENOSIS PRESSURE HALF TIME: 48.88 MS
MV VALVE AREA P 1/2 METHOD: 4.5 CM2
RA MAJOR: 4.86 CM
RA PRESSURE: 3 MMHG
RA WIDTH: 2.93 CM
RV TISSUE DOPPLER FREE WALL SYSTOLIC VELOCITY 1 (APICAL 4 CHAMBER VIEW): 14.85 CM/S
SINUS: 3.59 CM
STJ: 3.06 CM
TDI LATERAL: 0.1 M/S
TDI SEPTAL: 0.09 M/S
TDI: 0.1 M/S
TRICUSPID ANNULAR PLANE SYSTOLIC EXCURSION: 2.08 CM

## 2022-02-18 PROCEDURE — 93306 TTE W/DOPPLER COMPLETE: CPT

## 2022-02-18 PROCEDURE — 93306 TTE W/DOPPLER COMPLETE: CPT | Mod: 26,,, | Performed by: INTERNAL MEDICINE

## 2022-02-18 PROCEDURE — 93306 ECHO (CUPID ONLY): ICD-10-PCS | Mod: 26,,, | Performed by: INTERNAL MEDICINE

## 2022-03-08 ENCOUNTER — LAB VISIT (OUTPATIENT)
Dept: LAB | Facility: HOSPITAL | Age: 67
End: 2022-03-08
Payer: MEDICARE

## 2022-03-08 DIAGNOSIS — R97.20 RISING PSA LEVEL: ICD-10-CM

## 2022-03-08 LAB — COMPLEXED PSA SERPL-MCNC: 2.1 NG/ML (ref 0–4)

## 2022-03-08 PROCEDURE — 84153 ASSAY OF PSA TOTAL: CPT | Performed by: NURSE PRACTITIONER

## 2022-03-08 PROCEDURE — 36415 COLL VENOUS BLD VENIPUNCTURE: CPT | Performed by: NURSE PRACTITIONER

## 2022-03-09 ENCOUNTER — PATIENT MESSAGE (OUTPATIENT)
Dept: UROLOGY | Facility: CLINIC | Age: 67
End: 2022-03-09
Payer: MEDICARE

## 2022-03-09 DIAGNOSIS — N40.1 BPH WITH URINARY OBSTRUCTION: Primary | ICD-10-CM

## 2022-03-09 DIAGNOSIS — N13.8 BPH WITH URINARY OBSTRUCTION: Primary | ICD-10-CM

## 2022-03-09 NOTE — TELEPHONE ENCOUNTER
Patient of Kaitlin Andrade NP  Seen for rising PSA  PSA stable  Repeat in 6 months    Lab Results   Component Value Date    PSA 2.5 02/03/2022    PSA 1.4 12/04/2020    PSA 1.4 11/13/2019    PSA 1.1 05/01/2018    PSA 1.5 01/25/2018    PSA 0.98 01/26/2017    PSA 0.89 09/23/2015    PSA 0.69 08/15/2013    PSA 0.61 07/11/2011    PSADIAG 2.1 03/08/2022

## 2022-03-21 ENCOUNTER — PES CALL (OUTPATIENT)
Dept: ADMINISTRATIVE | Facility: CLINIC | Age: 67
End: 2022-03-21
Payer: MEDICARE

## 2022-04-08 ENCOUNTER — OFFICE VISIT (OUTPATIENT)
Dept: INTERNAL MEDICINE | Facility: CLINIC | Age: 67
End: 2022-04-08
Payer: MEDICARE

## 2022-04-08 VITALS
DIASTOLIC BLOOD PRESSURE: 60 MMHG | WEIGHT: 168.88 LBS | HEIGHT: 68 IN | RESPIRATION RATE: 16 BRPM | BODY MASS INDEX: 25.59 KG/M2 | HEART RATE: 70 BPM | SYSTOLIC BLOOD PRESSURE: 136 MMHG

## 2022-04-08 DIAGNOSIS — Q23.1 BICUSPID AORTIC VALVE: ICD-10-CM

## 2022-04-08 DIAGNOSIS — I10 HYPERTENSION, ESSENTIAL: ICD-10-CM

## 2022-04-08 DIAGNOSIS — Z86.19 HISTORY OF HEPATITIS C: ICD-10-CM

## 2022-04-08 DIAGNOSIS — Q24.9 CONGENITAL HEART ANOMALY: ICD-10-CM

## 2022-04-08 DIAGNOSIS — L57.0 AK (ACTINIC KERATOSIS): ICD-10-CM

## 2022-04-08 DIAGNOSIS — R16.1 SPLENOMEGALY: ICD-10-CM

## 2022-04-08 DIAGNOSIS — N40.0 BENIGN PROSTATIC HYPERPLASIA, UNSPECIFIED WHETHER LOWER URINARY TRACT SYMPTOMS PRESENT: ICD-10-CM

## 2022-04-08 DIAGNOSIS — Z00.00 ENCOUNTER FOR PREVENTIVE HEALTH EXAMINATION: Primary | ICD-10-CM

## 2022-04-08 DIAGNOSIS — D58.0 HEREDITARY SPHEROCYTIC HEMOLYTIC ANEMIA: Chronic | ICD-10-CM

## 2022-04-08 DIAGNOSIS — D47.Z9 LOW GRADE B CELL LYMPHOPROLIFERATIVE DISORDER: ICD-10-CM

## 2022-04-08 DIAGNOSIS — D47.2 SMOLDERING MYELOMA: ICD-10-CM

## 2022-04-08 DIAGNOSIS — K76.0 HEPATIC STEATOSIS: ICD-10-CM

## 2022-04-08 PROCEDURE — G0439 PR MEDICARE ANNUAL WELLNESS SUBSEQUENT VISIT: ICD-10-PCS | Mod: ,,, | Performed by: NURSE PRACTITIONER

## 2022-04-08 PROCEDURE — 99214 OFFICE O/P EST MOD 30 MIN: CPT | Mod: PBBFAC | Performed by: NURSE PRACTITIONER

## 2022-04-08 PROCEDURE — G0439 PPPS, SUBSEQ VISIT: HCPCS | Mod: ,,, | Performed by: NURSE PRACTITIONER

## 2022-04-08 PROCEDURE — 99999 PR PBB SHADOW E&M-EST. PATIENT-LVL IV: ICD-10-PCS | Mod: PBBFAC,,, | Performed by: NURSE PRACTITIONER

## 2022-04-08 PROCEDURE — 99999 PR PBB SHADOW E&M-EST. PATIENT-LVL IV: CPT | Mod: PBBFAC,,, | Performed by: NURSE PRACTITIONER

## 2022-04-08 RX ORDER — IBUPROFEN 100 MG/5ML
1000 SUSPENSION, ORAL (FINAL DOSE FORM) ORAL DAILY
COMMUNITY

## 2022-04-08 NOTE — Clinical Note
Medicare AWV completed. Gave him the number to schedule colonoscopy bc they haven't contacted him yet. Declined covid and flu vaccines.  --Chante

## 2022-04-08 NOTE — PROGRESS NOTES
"  Jose Sahni presented for a  Medicare AWV and comprehensive Health Risk Assessment today. The following components were reviewed and updated:    · Medical history  · Family History  · Social history  · Allergies and Current Medications  · Health Risk Assessment  · Health Maintenance  · Care Team         ** See Completed Assessments for Annual Wellness Visit within the encounter summary.**         The following assessments were completed:  · Living Situation  · CAGE  · Depression Screening  · Timed Get Up and Go  · Whisper Test  · Cognitive Function Screening    · Nutrition Screening  · ADL Screening  · PAQ Screening        Vitals:    04/08/22 1036   BP: 136/60   BP Location: Right arm   Patient Position: Sitting   BP Method: Medium (Manual)   Pulse: 70   Resp: 16   Weight: 76.6 kg (168 lb 14 oz)   Height: 5' 8" (1.727 m)     Body mass index is 25.68 kg/m².     Physical Exam  Vitals reviewed.   Constitutional:       Appearance: Normal appearance.   HENT:      Head: Normocephalic.   Cardiovascular:      Rate and Rhythm: Normal rate and regular rhythm.   Pulmonary:      Effort: Pulmonary effort is normal.      Breath sounds: Normal breath sounds.   Abdominal:      General: Bowel sounds are normal.   Musculoskeletal:         General: Normal range of motion.      Cervical back: Normal range of motion.      Right lower leg: No edema.      Left lower leg: No edema.   Skin:     General: Skin is warm and dry.      Capillary Refill: Capillary refill takes less than 2 seconds.   Neurological:      Mental Status: He is alert and oriented to person, place, and time.   Psychiatric:         Behavior: Behavior normal.         Thought Content: Thought content normal.         Judgment: Judgment normal.               Diagnoses and health risks identified today and associated recommendations/orders:    1. Encounter for preventive health examination  Assessments completed.  HM recommendations reviewed. Gave number to schedule " colonoscopy. Declines covid and flu vaccines.  F/u with PCP as instructed.    2. Hereditary spherocytic hemolytic anemia  Chronic, stable on current regimen. Followed by heme/onc.    3. Low grade B cell lymphoproliferative disorder  Chronic, stable on current regimen. Followed by heme/onc.    4. Smoldering myeloma  Chronic, stable on current regimen. Followed by heme/onc.    5. Hypertension, essential  Chronic, stable on current regimen. Followed by cardiology.    6. Bicuspid aortic valve  Chronic, stable on current regimen. Followed by cardiology.    7. Congenital heart anomaly  Chronic, stable on current regimen. Followed by cardiology.    8. Splenomegaly  Chronic, stable on current regimen. Followed by PCP.    9. History of hepatitis C,s/p successful Rx w/ SVR24 (cure) - 6/2018  Chronic, stable on current regimen. Followed by PCP. Completed f/u with hepatology.    10. Hepatic steatosis  Chronic, stable on current regimen. Followed by PCP.    11. AK (actinic keratosis)  Chronic, stable on current regimen. Followed by derm.    12. Benign prostatic hyperplasia, unspecified whether lower urinary tract symptoms present  Chronic, stable on current regimen. Followed by urology.      Provided Jose with a 5-10 year written screening schedule and personal prevention plan. Recommendations were developed using the USPSTF age appropriate recommendations. Education, counseling, and referrals were provided as needed. After Visit Summary printed and given to patient which includes a list of additional screenings\tests needed.    Follow up in about 1 year (around 4/8/2023) for Medicare AWV and with PCP as instructed.       Chante Vasques NP     I offered to discuss advanced care planning, including how to pick a person who would make decisions for you if you were unable to make them for yourself, called a health care power of , and what kind of decisions you might make such as use of life sustaining treatments such as  ventilators and tube feeding when faced with a life limiting illness recorded on a living will that they will need to know. (How you want to be cared for as you near the end of your natural life)     X  Patient has advanced directives written and agrees to provide copies to the institution.

## 2022-04-08 NOTE — PATIENT INSTRUCTIONS
1. You will receive a letter in the mail when it is time to schedule your next visit with Dr. Doug Lanza MD     2. Colonoscopy ordered by Dr. Lanza. If you have not heard from the endoscopy within 7-10 days, please call [phone] to schedule your colonoscopy.     Counseling and Referral of Other Preventative  (Italic type indicates deductible and co-insurance are waived)    Patient Name: Jose Sahni  Today's Date: 4/8/2022    Health Maintenance         Date Due Completion Date    COVID-19 Vaccine (1) Never done ---    Colorectal Cancer Screening 08/11/2021 8/11/2011    Influenza Vaccine (1) 09/01/2021 1/25/2018    Pneumococcal Vaccines (Age 65+) (2 - PPSV23 or PCV20) 02/03/2023 2/3/2022    Lipid Panel 02/03/2027 2/3/2022    TETANUS VACCINE 12/08/2030 12/8/2020          No orders of the defined types were placed in this encounter.    The following information is provided to all patients.  This information is to help you find resources for any of the problems found today that may be affecting your health:                Living healthy guide: www.[facility].[place].gov      Understanding Diabetes: www.diabetes.org      Eating healthy: www.cdc.gov/healthyweight      CDC home safety checklist: www.cdc.gov/steadi/patient.html      Agency on Aging: www.goea.[place].[facility]      Alcoholics anonymous (AA): www.aa.org      Physical Activity: www.vicky.nih.gov/dv8udft      Tobacco use: www.quitwithusla.org

## 2022-08-25 ENCOUNTER — TELEPHONE (OUTPATIENT)
Dept: INTERNAL MEDICINE | Facility: CLINIC | Age: 67
End: 2022-08-25
Payer: MEDICARE

## 2022-08-25 DIAGNOSIS — Z12.11 SPECIAL SCREENING FOR MALIGNANT NEOPLASMS, COLON: Primary | ICD-10-CM

## 2022-08-25 RX ORDER — POLYETHYLENE GLYCOL 3350, SODIUM SULFATE ANHYDROUS, SODIUM BICARBONATE, SODIUM CHLORIDE, POTASSIUM CHLORIDE 236; 22.74; 6.74; 5.86; 2.97 G/4L; G/4L; G/4L; G/4L; G/4L
4 POWDER, FOR SOLUTION ORAL ONCE
Qty: 4000 ML | Refills: 0 | Status: SHIPPED | OUTPATIENT
Start: 2022-08-25 | End: 2022-08-25

## 2022-08-25 NOTE — TELEPHONE ENCOUNTER
----- Message from Tiffany Eddy sent at 8/25/2022 11:04 AM CDT -----  Name Of Caller:Jose            Provider Name:Doug Cordero            Does patient feel the need to be seen today?No            Relationship to the Pt?:Patient            Contact Preference?:[phone]            What is the nature of the call?: Patient has question about referral for colonoscopy.

## 2022-09-27 ENCOUNTER — ANESTHESIA (OUTPATIENT)
Dept: ENDOSCOPY | Facility: HOSPITAL | Age: 67
End: 2022-09-27
Payer: MEDICARE

## 2022-09-27 ENCOUNTER — HOSPITAL ENCOUNTER (OUTPATIENT)
Facility: HOSPITAL | Age: 67
Discharge: HOME | End: 2022-09-27
Attending: INTERNAL MEDICINE | Admitting: INTERNAL MEDICINE
Payer: MEDICARE

## 2022-09-27 ENCOUNTER — ANESTHESIA EVENT (OUTPATIENT)
Dept: ENDOSCOPY | Facility: HOSPITAL | Age: 67
End: 2022-09-27
Payer: MEDICARE

## 2022-09-27 VITALS
OXYGEN SATURATION: 100 % | BODY MASS INDEX: 25.01 KG/M2 | HEART RATE: 70 BPM | HEIGHT: 68 IN | RESPIRATION RATE: 14 BRPM | SYSTOLIC BLOOD PRESSURE: 148 MMHG | TEMPERATURE: 98 F | WEIGHT: 165 LBS | DIASTOLIC BLOOD PRESSURE: 74 MMHG

## 2022-09-27 DIAGNOSIS — Z12.11 COLON CANCER SCREENING: Primary | ICD-10-CM

## 2022-09-27 DIAGNOSIS — Z12.11 SCREENING FOR COLON CANCER: ICD-10-CM

## 2022-09-27 PROBLEM — K63.5 COLON POLYPS: Status: ACTIVE | Noted: 2022-09-27

## 2022-09-27 PROCEDURE — 25000003 PHARM REV CODE 250: Performed by: INTERNAL MEDICINE

## 2022-09-27 PROCEDURE — 88305 TISSUE EXAM BY PATHOLOGIST: ICD-10-PCS | Mod: 26,,, | Performed by: PATHOLOGY

## 2022-09-27 PROCEDURE — 25000003 PHARM REV CODE 250: Performed by: NURSE ANESTHETIST, CERTIFIED REGISTERED

## 2022-09-27 PROCEDURE — E9220 PRA ENDO ANESTHESIA: HCPCS | Mod: PT,,, | Performed by: NURSE ANESTHETIST, CERTIFIED REGISTERED

## 2022-09-27 PROCEDURE — 45385 PR COLONOSCOPY,REMV LESN,SNARE: ICD-10-PCS | Mod: PT,,, | Performed by: INTERNAL MEDICINE

## 2022-09-27 PROCEDURE — 63600175 PHARM REV CODE 636 W HCPCS: Performed by: NURSE ANESTHETIST, CERTIFIED REGISTERED

## 2022-09-27 PROCEDURE — 45385 COLONOSCOPY W/LESION REMOVAL: CPT | Mod: PT | Performed by: INTERNAL MEDICINE

## 2022-09-27 PROCEDURE — 88305 TISSUE EXAM BY PATHOLOGIST: CPT | Mod: 26,,, | Performed by: PATHOLOGY

## 2022-09-27 PROCEDURE — 27201089 HC SNARE, DISP (ANY): Performed by: INTERNAL MEDICINE

## 2022-09-27 PROCEDURE — 88305 TISSUE EXAM BY PATHOLOGIST: CPT | Mod: 59 | Performed by: PATHOLOGY

## 2022-09-27 PROCEDURE — E9220 PRA ENDO ANESTHESIA: ICD-10-PCS | Mod: PT,,, | Performed by: NURSE ANESTHETIST, CERTIFIED REGISTERED

## 2022-09-27 PROCEDURE — 37000008 HC ANESTHESIA 1ST 15 MINUTES: Performed by: INTERNAL MEDICINE

## 2022-09-27 PROCEDURE — 37000009 HC ANESTHESIA EA ADD 15 MINS: Performed by: INTERNAL MEDICINE

## 2022-09-27 PROCEDURE — 27200997: Performed by: INTERNAL MEDICINE

## 2022-09-27 PROCEDURE — 45385 COLONOSCOPY W/LESION REMOVAL: CPT | Mod: PT,,, | Performed by: INTERNAL MEDICINE

## 2022-09-27 RX ORDER — PROPOFOL 10 MG/ML
VIAL (ML) INTRAVENOUS
Status: DISCONTINUED | OUTPATIENT
Start: 2022-09-27 | End: 2022-09-27

## 2022-09-27 RX ORDER — LIDOCAINE HYDROCHLORIDE 20 MG/ML
INJECTION INTRAVENOUS
Status: DISCONTINUED | OUTPATIENT
Start: 2022-09-27 | End: 2022-09-27

## 2022-09-27 RX ORDER — SODIUM CHLORIDE 9 MG/ML
INJECTION, SOLUTION INTRAVENOUS CONTINUOUS
Status: DISCONTINUED | OUTPATIENT
Start: 2022-09-27 | End: 2022-09-27 | Stop reason: HOSPADM

## 2022-09-27 RX ORDER — PROPOFOL 10 MG/ML
VIAL (ML) INTRAVENOUS CONTINUOUS PRN
Status: DISCONTINUED | OUTPATIENT
Start: 2022-09-27 | End: 2022-09-27

## 2022-09-27 RX ADMIN — Medication 150 MCG/KG/MIN: at 10:09

## 2022-09-27 RX ADMIN — SODIUM CHLORIDE: 0.9 INJECTION, SOLUTION INTRAVENOUS at 10:09

## 2022-09-27 RX ADMIN — LIDOCAINE HYDROCHLORIDE 30 MG: 20 INJECTION INTRAVENOUS at 10:09

## 2022-09-27 RX ADMIN — PROPOFOL 70 MG: 10 INJECTION, EMULSION INTRAVENOUS at 10:09

## 2022-09-27 NOTE — ANESTHESIA PREPROCEDURE EVALUATION
09/27/2022  Jose Sahni is a 67 y.o., male.        Patient Name: Jose Sahni  YOB: 1955  MRN: [id number]  [facility]: [id number]      Code Status: No Order   Date of Procedure: 9/27/2022  Anesthesia: General Procedure: Procedure(s) (LRB):  COLONOSCOPY (N/A)  Pre-Operative Diagnosis: Special screening for malignant neoplasms, colon [Z12.11]  Proceduralist: Surgeon(s) and Role:     * Antonio Russo MD - Primary        SUBJECTIVE:   Jose Sahni is a 67 y.o. male who  has a past medical history of Allergy, Asymptomatic cholelithiasis, Heart murmur, History of hepatitis C,s/p successful Rx w/ SVR24 (cure) - 6/2018 (10/18/2016), Hypertension, and Tuberculosis. No notes on file    ALLERGIES:     Review of patient's allergies indicates:   Allergen Reactions    Benazepril hcl Other (See Comments)     Coughing     MEDICATIONS:     Current Facility-Administered Medications   Medication Dose Route Frequency Provider Last Rate Last Admin    0.9%  NaCl infusion   Intravenous Continuous Antonio Russo MD              History:     Patient Active Problem List   Diagnosis    Hemolytic anemia    Splenomegaly    Hypertension, essential    Hereditary spherocytic hemolytic anemia    History of hepatitis C,s/p successful Rx w/ SVR24 (cure) - 6/2018    AK (actinic keratosis)    Hepatic steatosis    Congenital heart anomaly    Bicuspid aortic valve    Anemia of unknown etiology    Smoldering myeloma    Low grade B cell lymphoproliferative disorder     Surgical History:    has a past surgical history that includes Cardiac surgery (1962); Hernia repair (1967); Hand surgery (2008); and orchiopexy, left (age 12).   Social History:    reports being sexually active and has had partner(s) who are female.  reports that he has never smoked. He has never used smokeless tobacco. He reports that he does not drink  alcohol and does not use drugs.     Pre-op Assessment    I have reviewed the Patient Summary Reports.     I have reviewed the Nursing Notes. I have reviewed the NPO Status.   I have reviewed the Medications.     Review of Systems  Anesthesia Hx:  No problems with previous Anesthesia  Denies Family Hx of Anesthesia complications.   Denies Personal Hx of Anesthesia complications.   Hematology/Oncology:  Hematology Normal        Cardiovascular:   Hypertension    Hepatic/GI:   Bowel Prep. Liver Disease, Hepatitis    Musculoskeletal:  Musculoskeletal Normal    Neurological:  Neurology Normal    Dermatological:  Skin Normal        Physical Exam  General: Cooperative, Alert and Oriented    Airway:  Mallampati: II   Mouth Opening: Normal  TM Distance: Normal  Tongue: Normal  Neck ROM: Normal ROM    Dental:  Intact        Anesthesia Plan  Type of Anesthesia, risks & benefits discussed:    Anesthesia Type: Gen Natural Airway  Intra-op Monitoring Plan: Standard ASA Monitors  Induction:  IV  Informed Consent: Informed consent signed with the Patient and all parties understand the risks and agree with anesthesia plan.  All questions answered.   ASA Score: 3  Day of Surgery Review of History & Physical: H&P Update referred to the surgeon/provider.I have interviewed and examined the patient. I have reviewed the patient's H&P dated: There are no significant changes.     Ready For Surgery From Anesthesia Perspective.     .

## 2022-09-27 NOTE — TRANSFER OF CARE
"Anesthesia Transfer of Care Note    Patient: Jose Sahni    Procedure(s) Performed: Procedure(s) (LRB):  COLONOSCOPY (N/A)    Patient location: PACU    Anesthesia Type: general    Transport from OR: Transported from OR on room air with adequate spontaneous ventilation    Post pain: adequate analgesia    Post assessment: no apparent anesthetic complications and tolerated procedure well    Post vital signs: stable    Level of consciousness: awake, alert and oriented    Nausea/Vomiting: no nausea/vomiting    Complications: none    Transfer of care protocol was followed      Last vitals:   Visit Vitals  BP (!) 143/68(Patient Position: Lying)   Pulse 67   Temp 97.7   Resp 16   Ht 5' 8" (1.727 m)   Wt 74.8 kg (165 lb)   SpO2 98%   BMI 25.09 kg/m²     "

## 2022-09-27 NOTE — ANESTHESIA POSTPROCEDURE EVALUATION
Anesthesia Post Evaluation    Patient: Jose Sahni    Procedure(s) Performed: Procedure(s) (LRB):  COLONOSCOPY (N/A)    Final Anesthesia Type: general      Patient location during evaluation: PACU  Patient participation: Yes- Able to Participate  Level of consciousness: awake and alert and oriented  Post-procedure vital signs: reviewed and stable  Pain management: adequate  Airway patency: patent    PONV status at discharge: No PONV  Anesthetic complications: no      Cardiovascular status: blood pressure returned to baseline and hemodynamically stable  Respiratory status: unassisted and spontaneous ventilation  Hydration status: euvolemic  Follow-up not needed.          Vitals Value Taken Time   /74 09/27/22 1128   Temp 36.7 °C (98 °F) 09/27/22 1128   Pulse 70 09/27/22 1128   Resp 14 09/27/22 1128   SpO2 100 % 09/27/22 1128         Event Time   Out of Recovery 11:31:09         Pain/Melody Score: Melody Score: 10 (9/27/2022 11:27 AM)

## 2022-09-27 NOTE — PROVATION PATIENT INSTRUCTIONS
Discharge Summary/Instructions after an Endoscopic Procedure  Patient Name: Jose Sahni  Patient MRN: [id number]  Patient YOB: 1955  Tuesday, September 27, 2022  Antonio Russo MD  Dear patient,  As a result of recent federal legislation (The Federal Cures Act), you may   receive lab or pathology results from your procedure in your [facility]   account before your physician is able to contact you. Your physician or   their representative will relay the results to you with their   recommendations at their soonest availability.  Thank you,  RESTRICTIONS:  During your procedure today, you received medications for sedation.  These   medications may affect your judgment, balance and coordination.  Therefore,   for 24 hours, you have the following restrictions:   - DO NOT drive a car, operate machinery, make legal/financial decisions,   sign important papers or drink alcohol.    ACTIVITY:  Today: no heavy lifting, straining or running due to procedural   sedation/anesthesia.  The following day: return to full activity including work.  DIET:  Eat and drink normally unless instructed otherwise.     TREATMENT FOR COMMON SIDE EFFECTS:  - Mild abdominal pain, nausea, belching, bloating or excessive gas:  rest,   eat lightly and use a heating pad.  - Sore Throat: treat with throat lozenges and/or gargle with warm salt   water.  - Because air was used during the procedure, expelling large amounts of air   from your rectum or belching is normal.  - If a bowel prep was taken, you may not have a bowel movement for 1-3 days.    This is normal.  SYMPTOMS TO WATCH FOR AND REPORT TO YOUR PHYSICIAN:  1. Abdominal pain or bloating, other than gas cramps.  2. Chest pain.  3. Back pain.  4. Signs of infection such as: chills or fever occurring within 24 hours   after the procedure.  5. Rectal bleeding, which would show as bright red, maroon, or black stools.   (A tablespoon of blood from the rectum is not serious, especially  if   hemorrhoids are present.)  6. Vomiting.  7. Weakness or dizziness.  GO DIRECTLY TO THE NEAREST EMERGENCY ROOM IF YOU HAVE ANY OF THE FOLLOWING:      Difficulty breathing              Chills and/or fever over 101 F   Persistent vomiting and/or vomiting blood   Severe abdominal pain   Severe chest pain   Black, tarry stools   Bleeding- more than one tablespoon   Any other symptom or condition that you feel may need urgent attention  Your doctor recommends these additional instructions:  If any biopsies were taken, your doctors clinic will contact you in 1 to 2   weeks with any results.  - Discharge patient to home.   - Resume previous diet.   - Continue present medications.   - Await pathology results.   - Repeat colonoscopy in 3 years for surveillance.   - Patient has a contact number available for emergencies.  The signs and   symptoms of potential delayed complications were discussed with the   patient.  Return to normal activities tomorrow.  Written discharge   instructions were provided to the patient.   For questions, problems or results please call your physician - Antonio Russo MD at Work:  [phone].  [facility], EMERGENCY ROOM PHONE NUMBER: [phone]  IF A COMPLICATION OR EMERGENCY SITUATION ARISES AND YOU ARE UNABLE TO REACH   YOUR PHYSICIAN - GO DIRECTLY TO THE EMERGENCY ROOM.  Antonio Russo MD  9/27/2022 10:57:21 AM  This report has been verified and signed electronically.  Dear patient,  As a result of recent federal legislation (The Federal Cures Act), you may   receive lab or pathology results from your procedure in your [facility]   account before your physician is able to contact you. Your physician or   their representative will relay the results to you with their   recommendations at their soonest availability.  Thank you,  PROVATION

## 2022-09-27 NOTE — H&P
Short Stay Endoscopy History and Physical    PCP - Doug Lanza MD    Procedure - Colonoscopy  Sedation: GA  ASA - per anesthesia  Mallampati - per anesthesia  History of Anesthesia problems - no  Family history Anesthesia problems -  no     HPI:  This is a 67 y.o. male here for evaluation of : Screening for CRC    Reflux - no  Dysphagia - no  Abdominal pain - no  Diarrhea - no    ROS:  Constitutional: No fevers, chills, No weight loss  ENT: No allergies  CV: No chest pain  Pulm: No cough, No shortness of breath  Ophtho: No vision changes  GI: see HPI  Medical History:  has a past medical history of Allergy, Asymptomatic cholelithiasis, Heart murmur, History of hepatitis C,s/p successful Rx w/ SVR24 (cure) - 6/2018 (10/18/2016), Hypertension, and Tuberculosis.    Surgical History:  has a past surgical history that includes Cardiac surgery (1962); Hernia repair (1967); Hand surgery (2008); and orchiopexy, left (age 12).    Family History: family history includes Cancer in his paternal aunt and paternal uncle; Heart disease in his father; Stroke in his mother.. Otherwise no colon cancer, inflammatory bowel disease, or GI malignancies.    Social History:  reports that he has never smoked. He has never used smokeless tobacco. He reports that he does not drink alcohol and does not use drugs.    Review of patient's allergies indicates:   Allergen Reactions    Benazepril hcl Other (See Comments)     Coughing       Medications:   Medications Prior to Admission   Medication Sig Dispense Refill Last Dose    ascorbic acid, vitamin C, (VITAMIN C) 1000 MG tablet Take 1,000 mg by mouth once daily.   9/26/2022    folic acid (FOLVITE) 1 MG tablet Take 1 mg by mouth once daily.   9/26/2022    losartan (COZAAR) 100 MG tablet Take 1 tablet (100 mg total) by mouth once daily. 90 tablet 3 9/26/2022    betamethasone dipropionate (DIPROLENE) 0.05 % cream AAA ears and scalp bid prn 60 g 3     sildenafil (VIAGRA) 100 MG tablet Take 1  tablet (100 mg total) by mouth daily as needed for Erectile Dysfunction. 30 tablet 2        Objective Findings:    Vital Signs: Per nursing notes.    Physical Exam:  General Appearance: Well appearing in no acute distress  Head:   Normocephalic, without obvious abnormality  Eyes:    No scleral icterus  Airway: Open  Neck: No restriction in mobility  Lungs: CTA bilaterally in anterior and posterior fields, no wheezes, no crackles.  Heart:  Regular rate and rhythm, S1, S2 normal, no murmurs heard  Abdomen: Soft, non tender, non distended      Labs:  Lab Results   Component Value Date    WBC 6.13 05/12/2021    HGB 11.5 (L) 05/12/2021    HCT 33.1 (L) 05/12/2021     05/12/2021    CHOL 120 02/03/2022    TRIG 166 (H) 02/03/2022    HDL 36 (L) 02/03/2022    ALT 11 05/12/2021    AST 15 05/12/2021     05/12/2021    K 4.5 05/12/2021     05/12/2021    CREATININE 0.9 05/12/2021    BUN 15 05/12/2021    CO2 25 05/12/2021    TSH 1.84 07/11/2011    PSA 2.5 02/03/2022    INR 1.1 08/25/2017         I have explained the risks and benefits of endoscopy procedures to the patient including but not limited to bleeding, perforation, infection, and death.    Thank you so much for allowing me to participate in the care of Jose Russo MD

## 2022-09-29 ENCOUNTER — TELEPHONE (OUTPATIENT)
Dept: GASTROENTEROLOGY | Facility: CLINIC | Age: 67
End: 2022-09-29
Payer: MEDICARE

## 2022-09-29 LAB
FINAL PATHOLOGIC DIAGNOSIS: NORMAL
GROSS: NORMAL
Lab: NORMAL

## 2022-09-29 NOTE — TELEPHONE ENCOUNTER
----- Message from Antonio Russo MD sent at 9/29/2022  2:00 PM CDT -----  Please notify patient, the colon polyps were benign.

## 2023-02-27 ENCOUNTER — OFFICE VISIT (OUTPATIENT)
Dept: INTERNAL MEDICINE | Facility: CLINIC | Age: 68
End: 2023-02-27
Attending: FAMILY MEDICINE
Payer: MEDICARE

## 2023-02-27 ENCOUNTER — LAB VISIT (OUTPATIENT)
Dept: LAB | Facility: HOSPITAL | Age: 68
End: 2023-02-27
Attending: FAMILY MEDICINE
Payer: MEDICARE

## 2023-02-27 VITALS
DIASTOLIC BLOOD PRESSURE: 79 MMHG | SYSTOLIC BLOOD PRESSURE: 134 MMHG | HEIGHT: 68 IN | BODY MASS INDEX: 25.31 KG/M2 | WEIGHT: 167 LBS | HEART RATE: 70 BPM | TEMPERATURE: 97 F | OXYGEN SATURATION: 97 %

## 2023-02-27 DIAGNOSIS — R73.9 ELEVATED BLOOD SUGAR: ICD-10-CM

## 2023-02-27 DIAGNOSIS — R16.1 SPLENOMEGALY: ICD-10-CM

## 2023-02-27 DIAGNOSIS — D47.2 SMOLDERING MYELOMA: ICD-10-CM

## 2023-02-27 DIAGNOSIS — Q23.1 BICUSPID AORTIC VALVE: ICD-10-CM

## 2023-02-27 DIAGNOSIS — E78.5 HYPERLIPIDEMIA, UNSPECIFIED HYPERLIPIDEMIA TYPE: ICD-10-CM

## 2023-02-27 DIAGNOSIS — D59.8 OTHER ACQUIRED HEMOLYTIC ANEMIAS: ICD-10-CM

## 2023-02-27 DIAGNOSIS — R97.20 RISING PSA LEVEL: ICD-10-CM

## 2023-02-27 DIAGNOSIS — I10 HYPERTENSION, ESSENTIAL: Primary | ICD-10-CM

## 2023-02-27 DIAGNOSIS — I10 HYPERTENSION, ESSENTIAL: ICD-10-CM

## 2023-02-27 DIAGNOSIS — N13.8 BPH WITH URINARY OBSTRUCTION: ICD-10-CM

## 2023-02-27 DIAGNOSIS — D47.Z9 LOW GRADE B CELL LYMPHOPROLIFERATIVE DISORDER: ICD-10-CM

## 2023-02-27 DIAGNOSIS — D64.9 ANEMIA OF UNKNOWN ETIOLOGY: ICD-10-CM

## 2023-02-27 DIAGNOSIS — N40.1 BPH WITH URINARY OBSTRUCTION: ICD-10-CM

## 2023-02-27 DIAGNOSIS — C90.00 MULTIPLE MYELOMA NOT HAVING ACHIEVED REMISSION: ICD-10-CM

## 2023-02-27 LAB
ALBUMIN SERPL BCP-MCNC: 4.5 G/DL (ref 3.5–5.2)
ALP SERPL-CCNC: 60 U/L (ref 55–135)
ALT SERPL W/O P-5'-P-CCNC: 16 U/L (ref 10–44)
ANION GAP SERPL CALC-SCNC: 8 MMOL/L (ref 8–16)
AST SERPL-CCNC: 19 U/L (ref 10–40)
BILIRUB SERPL-MCNC: 1.6 MG/DL (ref 0.1–1)
BUN SERPL-MCNC: 23 MG/DL (ref 8–23)
CALCIUM SERPL-MCNC: 9.7 MG/DL (ref 8.7–10.5)
CHLORIDE SERPL-SCNC: 107 MMOL/L (ref 95–110)
CHOLEST SERPL-MCNC: 140 MG/DL (ref 120–199)
CHOLEST/HDLC SERPL: 3.6 {RATIO} (ref 2–5)
CO2 SERPL-SCNC: 24 MMOL/L (ref 23–29)
COMPLEXED PSA SERPL-MCNC: 2.2 NG/ML (ref 0–4)
CREAT SERPL-MCNC: 0.9 MG/DL (ref 0.5–1.4)
ERYTHROCYTE [DISTWIDTH] IN BLOOD BY AUTOMATED COUNT: 13.5 % (ref 11.5–14.5)
EST. GFR  (NO RACE VARIABLE): >60 ML/MIN/1.73 M^2
ESTIMATED AVG GLUCOSE: ABNORMAL MG/DL (ref 68–131)
GLUCOSE SERPL-MCNC: 90 MG/DL (ref 70–110)
HBA1C MFR BLD: <4 % (ref 4–5.6)
HCT VFR BLD AUTO: 36.5 % (ref 40–54)
HDLC SERPL-MCNC: 39 MG/DL (ref 40–75)
HDLC SERPL: 27.9 % (ref 20–50)
HGB BLD-MCNC: 12.5 G/DL (ref 14–18)
LDLC SERPL CALC-MCNC: 55.2 MG/DL (ref 63–159)
MCH RBC QN AUTO: 32.2 PG (ref 27–31)
MCHC RBC AUTO-ENTMCNC: 34.2 G/DL (ref 32–36)
MCV RBC AUTO: 94 FL (ref 82–98)
NONHDLC SERPL-MCNC: 101 MG/DL
PLATELET # BLD AUTO: 186 K/UL (ref 150–450)
PMV BLD AUTO: 10.4 FL (ref 9.2–12.9)
POTASSIUM SERPL-SCNC: 4.7 MMOL/L (ref 3.5–5.1)
PROT SERPL-MCNC: 8.2 G/DL (ref 6–8.4)
RBC # BLD AUTO: 3.88 M/UL (ref 4.6–6.2)
SODIUM SERPL-SCNC: 139 MMOL/L (ref 136–145)
TRIGL SERPL-MCNC: 229 MG/DL (ref 30–150)
WBC # BLD AUTO: 7.62 K/UL (ref 3.9–12.7)

## 2023-02-27 PROCEDURE — 36415 COLL VENOUS BLD VENIPUNCTURE: CPT | Performed by: NURSE PRACTITIONER

## 2023-02-27 PROCEDURE — 80053 COMPREHEN METABOLIC PANEL: CPT | Performed by: FAMILY MEDICINE

## 2023-02-27 PROCEDURE — 99214 PR OFFICE/OUTPT VISIT, EST, LEVL IV, 30-39 MIN: ICD-10-PCS | Mod: S$PBB,,, | Performed by: FAMILY MEDICINE

## 2023-02-27 PROCEDURE — 85027 COMPLETE CBC AUTOMATED: CPT | Performed by: FAMILY MEDICINE

## 2023-02-27 PROCEDURE — 83036 HEMOGLOBIN GLYCOSYLATED A1C: CPT | Performed by: FAMILY MEDICINE

## 2023-02-27 PROCEDURE — 84153 ASSAY OF PSA TOTAL: CPT | Performed by: NURSE PRACTITIONER

## 2023-02-27 PROCEDURE — 99999 PR PBB SHADOW E&M-EST. PATIENT-LVL V: CPT | Mod: PBBFAC,,, | Performed by: FAMILY MEDICINE

## 2023-02-27 PROCEDURE — 99214 OFFICE O/P EST MOD 30 MIN: CPT | Mod: S$PBB,,, | Performed by: FAMILY MEDICINE

## 2023-02-27 PROCEDURE — 99215 OFFICE O/P EST HI 40 MIN: CPT | Mod: PBBFAC | Performed by: FAMILY MEDICINE

## 2023-02-27 PROCEDURE — 99999 PR PBB SHADOW E&M-EST. PATIENT-LVL V: ICD-10-PCS | Mod: PBBFAC,,, | Performed by: FAMILY MEDICINE

## 2023-02-27 PROCEDURE — 80061 LIPID PANEL: CPT | Performed by: FAMILY MEDICINE

## 2023-02-27 NOTE — PROGRESS NOTES
Subjective:       Patient ID: Jose Sahni is a 68 y.o. male.    Chief Complaint: Annual Exam    Established patient follows up for management of chronic medical illnesses with complaints today. Please see dictation and ROS for interval problems, specific complaints and disease management discussion.    Past, Surgical, Family, Social, Histories; Medications, allergies reviewed and reconciled.  Health maintenance file reviewed and addressed items due. Recent applicable lab, imaging and cardiovascular results reviewed.  Problem list items reviewed and modified or added entries (in the overview section) may not be transcribed into this encounter note due to note writer format.      Last years referrals for HO,  and cardiology not completed.  Overdue for follow-up in those clinics.  No symptoms or clinical signs of decompensated illness at today's visit.    NSIHx    Review of Systems   Constitutional:  Negative for activity change, appetite change, chills, diaphoresis, fatigue, fever and unexpected weight change.   HENT:  Negative for congestion, hearing loss, postnasal drip, rhinorrhea, sore throat and trouble swallowing.    Eyes:  Negative for discharge and visual disturbance.   Respiratory:  Negative for cough, choking, chest tightness, shortness of breath and wheezing.    Cardiovascular:  Negative for chest pain, palpitations and leg swelling.   Gastrointestinal:  Negative for abdominal distention, abdominal pain, blood in stool, constipation, diarrhea, nausea and vomiting.   Endocrine: Negative for polydipsia and polyuria.   Genitourinary:  Negative for difficulty urinating, hematuria and urgency.   Musculoskeletal:  Negative for arthralgias, joint swelling, myalgias and neck pain.   Skin:  Negative for rash.   Neurological:  Negative for weakness, light-headedness and headaches.   Psychiatric/Behavioral:  Negative for confusion and dysphoric mood.      Objective:      Physical Exam  Vitals and nursing note  reviewed.   Constitutional:       Appearance: He is well-developed. He is not diaphoretic.   Eyes:      General: No scleral icterus.  Neck:      Thyroid: No thyromegaly.      Vascular: No carotid bruit or JVD.      Trachea: No tracheal deviation.   Cardiovascular:      Rate and Rhythm: Normal rate and regular rhythm.      Heart sounds: Murmur heard.     No friction rub. No gallop.   Pulmonary:      Effort: Pulmonary effort is normal. No respiratory distress.      Breath sounds: Normal breath sounds. No wheezing or rales.   Abdominal:      General: There is no distension.      Palpations: Abdomen is soft. There is no mass.      Tenderness: There is no abdominal tenderness. There is no guarding or rebound.   Musculoskeletal:      Cervical back: Normal range of motion and neck supple.      Comments: Post traumatic nail deformity L4th digit   Lymphadenopathy:      Cervical: No cervical adenopathy.   Skin:     General: Skin is warm and dry.      Findings: No erythema or rash.   Neurological:      Mental Status: He is alert and oriented to person, place, and time.      Cranial Nerves: No cranial nerve deficit.      Motor: No tremor.      Coordination: Coordination normal.      Gait: Gait normal.   Psychiatric:         Behavior: Behavior normal.         Thought Content: Thought content normal.         Judgment: Judgment normal.       Assessment:       1. Hypertension, essential    2. Multiple myeloma not having achieved remission    3. Other acquired hemolytic anemias    4. Smoldering myeloma    5. Low grade B cell lymphoproliferative disorder    6. Anemia of unknown etiology    7. Bicuspid aortic valve    8. Splenomegaly    9. Rising PSA level    10. Hyperlipidemia, unspecified hyperlipidemia type    11. Elevated blood sugar          Plan:     Medication List with Changes/Refills   Current Medications    ASCORBIC ACID, VITAMIN C, (VITAMIN C) 1000 MG TABLET    Take 1,000 mg by mouth once daily.    BETAMETHASONE  DIPROPIONATE (DIPROLENE) 0.05 % CREAM    AAA ears and scalp bid prn    FOLIC ACID (FOLVITE) 1 MG TABLET    Take 1 mg by mouth once daily.    LOSARTAN (COZAAR) 100 MG TABLET    Take 1 tablet (100 mg total) by mouth once daily.    SILDENAFIL (VIAGRA) 100 MG TABLET    Take 1 tablet (100 mg total) by mouth daily as needed for Erectile Dysfunction.     1. Hypertension, essential  -     Comprehensive Metabolic Panel; Future; Expected date: 02/27/2023    2. Multiple myeloma not having achieved remission  Overview:  -followed in hematology    Assessment & Plan:  LTFU      3. Other acquired hemolytic anemias  Overview:  -followed in hematology    Assessment & Plan:  LTFU    Orders:  -     Ambulatory referral/consult to Hematology / Oncology; Future; Expected date: 03/06/2023    4. Smoldering myeloma  Overview:  -followed in hematology    Assessment & Plan:  LTFU    Orders:  -     CBC Without Differential; Future; Expected date: 02/27/2023  -     Ambulatory referral/consult to Hematology / Oncology; Future; Expected date: 03/06/2023    5. Low grade B cell lymphoproliferative disorder  Overview:  -followed in hematology    Assessment & Plan:  LTFU    Orders:  -     Ambulatory referral/consult to Hematology / Oncology; Future; Expected date: 03/06/2023    6. Anemia of unknown etiology  Overview:  -followed in hematology    Assessment & Plan:  LTFU      7. Bicuspid aortic valve  Overview:  -followed in cardiology    Assessment & Plan:  No clinical decompensation    Orders:  -     Ambulatory referral/consult to Cardiology; Future; Expected date: 03/06/2023    8. Splenomegaly  Overview:  -related to lymphoproliferative disorder, hx Hep C treated. Followed by hematology      9. Rising PSA level  Assessment & Plan:  -see urology note 2/8/2022 - LTFU    Orders:  -     Ambulatory referral/consult to Urology; Future; Expected date: 03/06/2023    10. Hyperlipidemia, unspecified hyperlipidemia type  Overview:  -naive LDL  <60    Orders:  -     Lipid Panel; Future; Expected date: 02/27/2023    11. Elevated blood sugar  -     Hemoglobin A1C; Future; Expected date: 02/27/2023      See meds, orders, follow up, routing and instructions sections of encounter and AVS. Discussed with patient and provided on AVS.    Discussed diet and exercise and links provided on AVS for detailed information.  Lab Results   Component Value Date     05/12/2021    K 4.5 05/12/2021     05/12/2021    BUN 15 05/12/2021    CREATININE 0.9 05/12/2021     05/12/2021    AST 15 05/12/2021    ALT 11 05/12/2021    ALBUMIN 4.2 05/12/2021    PROT 7.6 05/12/2021    BILITOT 1.9 (H) 05/12/2021    CHOL 120 02/03/2022    HDL 36 (L) 02/03/2022    LDLCALC 50.8 (L) 02/03/2022    TRIG 166 (H) 02/03/2022    WBC 6.13 05/12/2021    HGB 11.5 (L) 05/12/2021    HCT 33.1 (L) 05/12/2021     05/12/2021    PSA 2.5 02/03/2022    PSADIAG 2.1 03/08/2022    TSH 1.84 07/11/2011

## 2023-02-27 NOTE — PATIENT INSTRUCTIONS
See standing or future lab orders and please draw CMP, CBC, Lipids, and PSA - today, thank you.     Information about cholesterol, high blood pressure and healthy diet and activity recommendations can be found at the following links on the Internet:    http://www.nhlbi.nih.gov/health/health-topics/topics/hbc  http://www.nhlbi.nih.gov/health/educational/lose_wt/index.htm  Http://www.nhlbi.nih.gov/files/docs/public/heart/hbp_low.pdf  http://www.heart.org/HEARTORG/  http://diabetes.org/  https://www.cdc.gov/  Https://healthfinder.gov/  https://health.gov/dietaryguidelines/2015/guidelines/  https://health.gov/paguidelines/second-edition/pdf/Physical_Activity_Guidelines_2nd_edition.pdf

## 2023-02-28 ENCOUNTER — OFFICE VISIT (OUTPATIENT)
Dept: UROLOGY | Facility: CLINIC | Age: 68
End: 2023-02-28
Attending: FAMILY MEDICINE
Payer: MEDICARE

## 2023-02-28 VITALS
HEIGHT: 68 IN | WEIGHT: 164.44 LBS | SYSTOLIC BLOOD PRESSURE: 132 MMHG | HEART RATE: 79 BPM | DIASTOLIC BLOOD PRESSURE: 69 MMHG | BODY MASS INDEX: 24.92 KG/M2

## 2023-02-28 DIAGNOSIS — R97.20 RISING PSA LEVEL: ICD-10-CM

## 2023-02-28 LAB
BILIRUB SERPL-MCNC: ABNORMAL MG/DL
BLOOD URINE, POC: ABNORMAL
CLARITY, POC UA: CLEAR
COLOR, POC UA: YELLOW
GLUCOSE UR QL STRIP: ABNORMAL
KETONES UR QL STRIP: ABNORMAL
LEUKOCYTE ESTERASE URINE, POC: ABNORMAL
NITRITE, POC UA: ABNORMAL
PH, POC UA: 5
PROTEIN, POC: ABNORMAL
SPECIFIC GRAVITY, POC UA: 1.02
UROBILINOGEN, POC UA: ABNORMAL

## 2023-02-28 PROCEDURE — 99214 OFFICE O/P EST MOD 30 MIN: CPT | Mod: S$PBB,,, | Performed by: NURSE PRACTITIONER

## 2023-02-28 PROCEDURE — 81002 URINALYSIS NONAUTO W/O SCOPE: CPT | Mod: PBBFAC | Performed by: NURSE PRACTITIONER

## 2023-02-28 PROCEDURE — 99999 PR PBB SHADOW E&M-EST. PATIENT-LVL III: ICD-10-PCS | Mod: PBBFAC,,, | Performed by: NURSE PRACTITIONER

## 2023-02-28 PROCEDURE — 99214 PR OFFICE/OUTPT VISIT, EST, LEVL IV, 30-39 MIN: ICD-10-PCS | Mod: S$PBB,,, | Performed by: NURSE PRACTITIONER

## 2023-02-28 PROCEDURE — 99999 PR PBB SHADOW E&M-EST. PATIENT-LVL III: CPT | Mod: PBBFAC,,, | Performed by: NURSE PRACTITIONER

## 2023-02-28 PROCEDURE — 99213 OFFICE O/P EST LOW 20 MIN: CPT | Mod: PBBFAC | Performed by: NURSE PRACTITIONER

## 2023-02-28 NOTE — PROGRESS NOTES
CHIEF COMPLAINT:    Mr. Sahni is a 68 y.o. male presenting for   Chief Complaint   Patient presents with    Elevated PSA       PRESENTING ILLNESS:    Jose Sahni is a 68 y.o. male with a PMH of htn, hx hep C (SVR24), smoldering myeloma who presents for annual visit.    Established patient to urology department.  Presents today for annual exam.  Previously evaluated for rising PSA. Had PSA drawn 2/2022 2.5 previous year 1.4.  Paternal cousin with prostate cancer.  Recent PSA 2.1, which is stable.  Recommend continued monitoring yearly.    He reports a good urinary stream and complete bladder emptying.  Reports having nocturia 3-4 per night.  Also having some hesitancy during the day.  These symptoms are not bothersome to him.    Has history of erectile dysfunction.  Prescribed viagra, but does not use it.  Currently not an issue for him.    REVIEW OF SYSTEMS:    Review of Systems   Constitutional:  Negative for chills and fever.   Respiratory:  Negative for shortness of breath.    Cardiovascular:  Negative for chest pain.   Gastrointestinal:  Negative for constipation and diarrhea.   Genitourinary:  Negative for dysuria, flank pain, frequency, hematuria and urgency.   Neurological:  Negative for dizziness and weakness.     PATIENT HISTORY:    Past Medical History:   Diagnosis Date    Allergy     Anemia of unknown etiology 12/18/2019    -followed in hematology    Asymptomatic cholelithiasis     Heart murmur     History of hepatitis C,s/p successful Rx w/ SVR24 (cure) - 6/2018 10/18/2016    GT2, tx naive  2014 liver biopsy, grade 1 stage 1 9-2017 Fibroscan = F2   Completed Epclusa 12wks w/ SVR    Hypertension     Tuberculosis     Hx postive       Family History   Problem Relation Age of Onset    Stroke Mother     Heart disease Father     Cancer Paternal Aunt     Cancer Paternal Uncle     Melanoma Neg Hx        Allergies:  Benazepril hcl    Medications:    Current Outpatient Medications:     ascorbic acid, vitamin C,  (VITAMIN C) 1000 MG tablet, Take 1,000 mg by mouth once daily., Disp: , Rfl:     betamethasone dipropionate (DIPROLENE) 0.05 % cream, AAA ears and scalp bid prn, Disp: 60 g, Rfl: 3    folic acid (FOLVITE) 1 MG tablet, Take 1 mg by mouth once daily., Disp: , Rfl:     losartan (COZAAR) 100 MG tablet, Take 1 tablet (100 mg total) by mouth once daily., Disp: 90 tablet, Rfl: 3    sildenafil (VIAGRA) 100 MG tablet, Take 1 tablet (100 mg total) by mouth daily as needed for Erectile Dysfunction., Disp: 30 tablet, Rfl: 2    PHYSICAL EXAMINATION:    Physical Exam  Vitals and nursing note reviewed.   Constitutional:       Appearance: Normal appearance. He is well-developed.   HENT:      Head: Normocephalic and atraumatic.   Eyes:      Pupils: Pupils are equal, round, and reactive to light.   Pulmonary:      Effort: Pulmonary effort is normal.   Genitourinary:     Prostate: Enlarged. Not tender.      Rectum: External hemorrhoid present.      Comments: Prostate smooth, no nodules, approximately 40 gm  Musculoskeletal:         General: Normal range of motion.      Cervical back: Normal range of motion.   Skin:     General: Skin is warm and dry.   Neurological:      Mental Status: He is alert and oriented to person, place, and time.   Psychiatric:         Behavior: Behavior normal.         LABS:    Lab Results   Component Value Date    PSA 2.5 02/03/2022    PSA 1.4 12/04/2020    PSA 1.4 11/13/2019    PSA 1.1 05/01/2018    PSA 1.5 01/25/2018    PSA 0.98 01/26/2017    PSA 0.89 09/23/2015    PSA 0.69 08/15/2013    PSA 0.61 07/11/2011    PSADIAG 2.2 02/27/2023    PSADIAG 2.1 03/08/2022       IMPRESSION:  Encounter Diagnoses   Name Primary?    Rising PSA level          PLAN:  Problem List Items Addressed This Visit       Rising PSA level    Relevant Orders    POCT URINE DIPSTICK WITHOUT MICROSCOPE       1. Rising PSA   - resolved  - PSA 2.2 from 2.5 (2022) <-1.4 (2021) <- 1.9 (2011)   - continue monitoring yearly with Dipika  2. bph with  luts   - discussed treatment with medication, not bothersome enough at this time to patient  3.  rtc in 1 yr or sooner prn    Kaitlin Andrade NP    I spent 30 minutes with the patient. Over 50% of the visit was spent in counseling.

## 2023-03-07 ENCOUNTER — OFFICE VISIT (OUTPATIENT)
Dept: HEMATOLOGY/ONCOLOGY | Facility: CLINIC | Age: 68
End: 2023-03-07
Payer: MEDICARE

## 2023-03-07 ENCOUNTER — LAB VISIT (OUTPATIENT)
Dept: LAB | Facility: HOSPITAL | Age: 68
End: 2023-03-07
Attending: INTERNAL MEDICINE
Payer: MEDICARE

## 2023-03-07 VITALS
TEMPERATURE: 99 F | DIASTOLIC BLOOD PRESSURE: 63 MMHG | HEIGHT: 68 IN | RESPIRATION RATE: 20 BRPM | SYSTOLIC BLOOD PRESSURE: 138 MMHG | BODY MASS INDEX: 25.57 KG/M2 | OXYGEN SATURATION: 98 % | HEART RATE: 79 BPM | WEIGHT: 168.75 LBS

## 2023-03-07 DIAGNOSIS — D47.2 SMOLDERING MYELOMA: ICD-10-CM

## 2023-03-07 DIAGNOSIS — D58.0 HEREDITARY SPHEROCYTIC HEMOLYTIC ANEMIA: Primary | Chronic | ICD-10-CM

## 2023-03-07 DIAGNOSIS — D47.Z9 LOW GRADE B CELL LYMPHOPROLIFERATIVE DISORDER: ICD-10-CM

## 2023-03-07 DIAGNOSIS — Z86.19 HISTORY OF HEPATITIS C: ICD-10-CM

## 2023-03-07 DIAGNOSIS — D59.8 OTHER ACQUIRED HEMOLYTIC ANEMIAS: ICD-10-CM

## 2023-03-07 DIAGNOSIS — D47.Z9 LOW GRADE B CELL LYMPHOPROLIFERATIVE DISORDER: Primary | ICD-10-CM

## 2023-03-07 LAB
ALBUMIN SERPL BCP-MCNC: 4.2 G/DL (ref 3.5–5.2)
ALP SERPL-CCNC: 61 U/L (ref 55–135)
ALT SERPL W/O P-5'-P-CCNC: 15 U/L (ref 10–44)
ANION GAP SERPL CALC-SCNC: 4 MMOL/L (ref 8–16)
AST SERPL-CCNC: 15 U/L (ref 10–40)
BASOPHILS # BLD AUTO: 0.04 K/UL (ref 0–0.2)
BASOPHILS NFR BLD: 0.5 % (ref 0–1.9)
BILIRUB SERPL-MCNC: 1.5 MG/DL (ref 0.1–1)
BUN SERPL-MCNC: 18 MG/DL (ref 8–23)
CALCIUM SERPL-MCNC: 9.4 MG/DL (ref 8.7–10.5)
CHLORIDE SERPL-SCNC: 110 MMOL/L (ref 95–110)
CO2 SERPL-SCNC: 25 MMOL/L (ref 23–29)
CREAT SERPL-MCNC: 0.9 MG/DL (ref 0.5–1.4)
DIFFERENTIAL METHOD: ABNORMAL
EOSINOPHIL # BLD AUTO: 0.1 K/UL (ref 0–0.5)
EOSINOPHIL NFR BLD: 1.1 % (ref 0–8)
ERYTHROCYTE [DISTWIDTH] IN BLOOD BY AUTOMATED COUNT: 13.3 % (ref 11.5–14.5)
EST. GFR  (NO RACE VARIABLE): >60 ML/MIN/1.73 M^2
GLUCOSE SERPL-MCNC: 88 MG/DL (ref 70–110)
HCT VFR BLD AUTO: 35.7 % (ref 40–54)
HGB BLD-MCNC: 12.5 G/DL (ref 14–18)
IGA SERPL-MCNC: 66 MG/DL (ref 40–350)
IGG SERPL-MCNC: 1790 MG/DL (ref 650–1600)
IGM SERPL-MCNC: 85 MG/DL (ref 50–300)
IMM GRANULOCYTES # BLD AUTO: 0.02 K/UL (ref 0–0.04)
IMM GRANULOCYTES NFR BLD AUTO: 0.2 % (ref 0–0.5)
LDH SERPL L TO P-CCNC: 133 U/L (ref 110–260)
LYMPHOCYTES # BLD AUTO: 1.7 K/UL (ref 1–4.8)
LYMPHOCYTES NFR BLD: 20.6 % (ref 18–48)
MCH RBC QN AUTO: 32.3 PG (ref 27–31)
MCHC RBC AUTO-ENTMCNC: 35 G/DL (ref 32–36)
MCV RBC AUTO: 92 FL (ref 82–98)
MONOCYTES # BLD AUTO: 0.6 K/UL (ref 0.3–1)
MONOCYTES NFR BLD: 7.4 % (ref 4–15)
NEUTROPHILS # BLD AUTO: 5.6 K/UL (ref 1.8–7.7)
NEUTROPHILS NFR BLD: 70.2 % (ref 38–73)
NRBC BLD-RTO: 0 /100 WBC
PLATELET # BLD AUTO: 179 K/UL (ref 150–450)
PMV BLD AUTO: 10.1 FL (ref 9.2–12.9)
POTASSIUM SERPL-SCNC: 5 MMOL/L (ref 3.5–5.1)
PROT SERPL-MCNC: 7.7 G/DL (ref 6–8.4)
RBC # BLD AUTO: 3.87 M/UL (ref 4.6–6.2)
RETICS/RBC NFR AUTO: 4.7 % (ref 0.4–2)
SODIUM SERPL-SCNC: 139 MMOL/L (ref 136–145)
WBC # BLD AUTO: 8.01 K/UL (ref 3.9–12.7)

## 2023-03-07 PROCEDURE — 99999 PR PBB SHADOW E&M-EST. PATIENT-LVL III: ICD-10-PCS | Mod: PBBFAC,,, | Performed by: INTERNAL MEDICINE

## 2023-03-07 PROCEDURE — 84165 PATHOLOGIST INTERPRETATION SPE: ICD-10-PCS | Mod: 26,,, | Performed by: PATHOLOGY

## 2023-03-07 PROCEDURE — 84165 PROTEIN E-PHORESIS SERUM: CPT | Performed by: INTERNAL MEDICINE

## 2023-03-07 PROCEDURE — 99999 PR PBB SHADOW E&M-EST. PATIENT-LVL III: CPT | Mod: PBBFAC,,, | Performed by: INTERNAL MEDICINE

## 2023-03-07 PROCEDURE — 83521 IG LIGHT CHAINS FREE EACH: CPT | Mod: 59 | Performed by: INTERNAL MEDICINE

## 2023-03-07 PROCEDURE — 86334 IMMUNOFIX E-PHORESIS SERUM: CPT | Performed by: INTERNAL MEDICINE

## 2023-03-07 PROCEDURE — 82784 ASSAY IGA/IGD/IGG/IGM EACH: CPT | Performed by: INTERNAL MEDICINE

## 2023-03-07 PROCEDURE — 83615 LACTATE (LD) (LDH) ENZYME: CPT | Performed by: INTERNAL MEDICINE

## 2023-03-07 PROCEDURE — 36415 COLL VENOUS BLD VENIPUNCTURE: CPT | Performed by: INTERNAL MEDICINE

## 2023-03-07 PROCEDURE — 99214 PR OFFICE/OUTPT VISIT, EST, LEVL IV, 30-39 MIN: ICD-10-PCS | Mod: S$PBB,ICN,CMP, | Performed by: INTERNAL MEDICINE

## 2023-03-07 PROCEDURE — 84165 PROTEIN E-PHORESIS SERUM: CPT | Mod: 26,,, | Performed by: PATHOLOGY

## 2023-03-07 PROCEDURE — 86334 IMMUNOFIX E-PHORESIS SERUM: CPT | Mod: 26,,, | Performed by: PATHOLOGY

## 2023-03-07 PROCEDURE — 85025 COMPLETE CBC W/AUTO DIFF WBC: CPT | Performed by: INTERNAL MEDICINE

## 2023-03-07 PROCEDURE — 80053 COMPREHEN METABOLIC PANEL: CPT | Performed by: INTERNAL MEDICINE

## 2023-03-07 PROCEDURE — 85045 AUTOMATED RETICULOCYTE COUNT: CPT | Performed by: INTERNAL MEDICINE

## 2023-03-07 PROCEDURE — 99213 OFFICE O/P EST LOW 20 MIN: CPT | Mod: PBBFAC | Performed by: INTERNAL MEDICINE

## 2023-03-07 PROCEDURE — 86334 PATHOLOGIST INTERPRETATION IFE: ICD-10-PCS | Mod: 26,,, | Performed by: PATHOLOGY

## 2023-03-07 PROCEDURE — 99214 OFFICE O/P EST MOD 30 MIN: CPT | Mod: S$PBB,ICN,CMP, | Performed by: INTERNAL MEDICINE

## 2023-03-07 NOTE — PROGRESS NOTES
CC: Hemolytic anemia, smoldering myeloma, B cell lymphoproliferative disorder, follow up     HPI: Mr. Sahni, 68, is here for a follow up visit for anemia. He has chronic hepatitis C, chronic anemia, gall stones, HTn.  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 He was being followed here previously by Dr.Archie Sahni, who last saw him in 2013. His CBC and other labs ( bilirubin, retic count) at that time suggested that he had hemolytic anemia or compensated hemolysis.  CBC from 11/2012 showed hemoglobin of 13.9 and  MCV was 89.  His indirect bilirubin has been elevated.  He had numerous gallstones and he has splenomegaly.  Hemoglobin electrophoresis was normal. G6PD was normal. JUDD was negative. Haptoglobin was low. Osmotic fragility was ordered, but was not done.   There is no family h/o anemia/ other hematologic condition.        He had a bone marrow biopsy on 6/30/20. There was a kappa light chain restricted plasma cell population (10% total cellularity) and a kappa light chain restricted B-cell population (<5% total cellularity).   The overall findings favor a plasma cell neoplasm and a concurrent B-cell lymphoproliferative disorder with chronic lymphocytic leukemia/small lymphocytic lymphoma immunophenotype.     Interval History: Denies yellowing of skin / eyes. No abdominal pain, fatigue, weight loss or change of appetite. No h/o discolored urine. He has back pain.       Review of Systems   Constitutional:  Positive for malaise/fatigue. Negative for fever and weight loss.   HENT:  Negative for ear pain, hearing loss and nosebleeds.    Eyes:  Negative for blurred vision, double vision, photophobia and pain.   Cardiovascular:  Negative for chest pain, palpitations and orthopnea.   Gastrointestinal:  Negative for diarrhea, heartburn, melena, nausea and vomiting.   Genitourinary:  Negative for dysuria and frequency.   Musculoskeletal:  Negative for back pain and joint pain.   Skin:  Negative for rash.    Neurological:  Negative for tingling, tremors, sensory change, speech change and headaches.   Endo/Heme/Allergies:  Does not bruise/bleed easily.   Psychiatric/Behavioral:  Negative for depression, hallucinations, substance abuse and suicidal ideas. The patient is not nervous/anxious.        Current Outpatient Medications   Medication Sig    ascorbic acid, vitamin C, (VITAMIN C) 1000 MG tablet Take 1,000 mg by mouth once daily.    betamethasone dipropionate (DIPROLENE) 0.05 % cream AAA ears and scalp bid prn    folic acid (FOLVITE) 1 MG tablet Take 1 mg by mouth once daily.    losartan (COZAAR) 100 MG tablet Take 1 tablet (100 mg total) by mouth once daily.    sildenafil (VIAGRA) 100 MG tablet Take 1 tablet (100 mg total) by mouth daily as needed for Erectile Dysfunction.     No current facility-administered medications for this visit.       Vitals:    03/07/23 1108   BP: 138/63   Pulse: 79   Resp: 20   Temp: 98.5 °F (36.9 °C)         Physical Exam  Constitutional:       Appearance: Normal appearance. He is not ill-appearing.   HENT:      Mouth/Throat:      Pharynx: No oropharyngeal exudate.   Eyes:      General: No scleral icterus.  Cardiovascular:      Rate and Rhythm: Normal rate and regular rhythm.      Pulses: Normal pulses.      Heart sounds: Murmur heard.   Pulmonary:      Effort: Pulmonary effort is normal.      Breath sounds: Normal breath sounds. No stridor. No rhonchi.   Abdominal:      General: There is no distension.      Palpations: There is no mass.      Tenderness: There is no abdominal tenderness.   Neurological:      General: No focal deficit present.      Mental Status: He is alert and oriented to person, place, and time.         Ref Range & Units 6yr ago   Hgb A2 Quant 1.5 - 3.8 % 3.1    Hemoglobin Bands   Hb A and Hb A2    Hemoglobin Electrophoresis Interp   Normal       Component      Latest Ref Rng & Units 9/3/2013   Direct Lety (JUDD)       NEG   G6PD Quant      7.0 - 20.5 U/G Hg 13.9    Haptoglobin      30 - 250 mg/dL <10 (L)      12/18/19 serum SHAWN: IgG kappa specific monoclonal band in gamma.       12/18/19 osmotic fragility: Increased erythrocyte osmotic fragility. These results may be due to spherocytosis of any cause, such as hereditary spherocytosis, immune hemolytic anemia, or may reflect the presence of spherocytes that commonly are observed following blood transfusion. Some cases of congenital nonspherocytic hemolytic anemia may also be associated with increased osmotic fragility in this test. These include congenital hemolytic anemia due to pyruvate kinase   deficiency or glucose-6-phosphate dehydrogenase deficiency or unstable hemoglobin hemolytic anemia. Some medications may cause an abnormal osmotic fragility. Results must be   interpreted in the context of other clinical and laboratory data, including examination of close relatives (parents, siblings, children) to determine mode of inheritance,   results of Lety test, and erythrocyte morphology. EMA binding (band 3 fluorescence staining) flow cytometry testing is available as a component of the RBC Membrane Evaluation (RBCME).             6/15/20 SPEP :Normal total protein. Normal gamma globulins are decreased. There is a monoclonal protein peak in gamma measuring 1.30 g/dL, previously 1.18 g/dL.         6/30/20 BONE MARROW, LEFT ILIAC CREST (ASPIRATE SMEAR, TOUCH PREPARATION, CLOT SECTION,AND CORE BIOPSY):     -- PLASMA CELL NEOPLASM.  -- B-CELL LYMPHOPROLIFERATIVE DISORDER WITH CHRONIC LYMPHOCYTIC LEUKEMIA/SMALL  LYMPHOCYTIC LYMPHOMA IMMUNOPHENOTYPE.  -- NORMOCELLULAR MARROW (40%) WITH TRILINEAGE HEMATOPOIESIS.  -- ADEQUATE IRON STORAGE.  -- SEE COMMENT.        Comments  Flow cytometric analysis of bone marrow detects a kappa light chain restricted plasma cell population that expresses , CD38, CD56 (dim), and CD45 (dim). It is negative for CD19 and CD20. In addition, there  is a kappa light chain restricted B lymphocyte  population showing expression of CD19, CD20 (dim), CD5 (dim), and CD23 (partial). It is negative for CD38 and FMC-7. T lymphocytes are immunophenotypically  unremarkable. The blasts gate is not increased. Flow differential: Lymphocytes 9.7%, Monocytes 3.5%,Granulocytes 78.8%, Blast 2.5%, Debris/nRBC 5.6%, Viability 94.7%.  Immunohistochemical stains are performed on the biopsy core for greater sensitivity and further architectural.   There is a paratrabecular lymphoid aggregate containing predominantly CD20  positive B cells. The B-cells are positive for CD5, CD23, and LEF1; negative for cyclin D1 and SOX11.  Scattered CD3-positive T-cells are seen.  Taken together, there is a kappa light chain restricted plasma cell population (10% total cellularity) and a kappa light chain restricted B-cell population (<5% total cellularity). Although rare CLL/SLL with plasmacytic  differentiation has been described in literature, the overall findings favor a plasma cell neoplasm and a concurrent B-cell lymphoproliferative disorder with chronic lymphocytic leukemia/small lymphocytic  lymphoma immunophenotype. Correlation with clinical presentation and other laboratory results is required.sessment with adequate controls. -positive plasma cells comprise approximately  10% of the total cellularity.      Component      Latest Ref Rng & Units 6/30/2020   Chromosome analysis BM Result Summary       Normal   Interpretation       SEE BELOW   Results       46,XY[20]   Reason for Referral       D47.2  Monoclonal gammopathy   Specimen       Bone Marrow   Source       Test Not Performed   Method       Culture without mitogens   Banding Methods, BM Chromosome       SEE BELOW   Chromosome analysis BM Additional Information       Test Not Performed         10/20/20 PET CT    Impression:     No evidence of abnormal uptake to suggest active osseous disease.  No evidence of extramedullary disease.     Cholelithiasis without evidence of acute  cholecystitis.     Component      Latest Ref Rng & Units 2/27/2023   Sodium      136 - 145 mmol/L 139   Potassium      3.5 - 5.1 mmol/L 4.7   Chloride      95 - 110 mmol/L 107   CO2      23 - 29 mmol/L 24   Glucose      70 - 110 mg/dL 90   BUN      8 - 23 mg/dL 23   Creatinine      0.5 - 1.4 mg/dL 0.9   Calcium      8.7 - 10.5 mg/dL 9.7   PROTEIN TOTAL      6.0 - 8.4 g/dL 8.2   Albumin      3.5 - 5.2 g/dL 4.5   BILIRUBIN TOTAL      0.1 - 1.0 mg/dL 1.6 (H)   Alkaline Phosphatase      55 - 135 U/L 60   AST      10 - 40 U/L 19   ALT      10 - 44 U/L 16   Anion Gap      8 - 16 mmol/L 8   eGFR      >60 mL/min/1.73 m:2 >60.0   WBC      3.90 - 12.70 K/uL 7.62   RBC      4.60 - 6.20 M/uL 3.88 (L)   Hemoglobin      14.0 - 18.0 g/dL 12.5 (L)   Hematocrit      40.0 - 54.0 % 36.5 (L)   MCV      82 - 98 fL 94   MCH      27.0 - 31.0 pg 32.2 (H)   MCHC      32.0 - 36.0 g/dL 34.2   RDW      11.5 - 14.5 % 13.5   Platelets      150 - 450 K/uL 186   MPV      9.2 - 12.9 fL 10.4      Assessment:     1. Chronic anemia  2. Gallstones  3. H/o hepatitis C  4. Smoldering myeloma  5. B cell lymphoproliferative disorder  6. Essential HTN        Plan:     1. He had multiple gall stones, low haptoglobin, mild conjugated+ unconjugated hyperbilirubinemia, splenomegaly, reticulocytosis in 2013--all suggestive for a hemolytic process. He has never been transfused. No clear family h/o anemia. He denies pruritus, discolored urine, abdominal pain, fatigue now. He had normal hemoglobin electrophoresis in 2013. [facility] screen was normal. JUDD was negative. LDH was normal. Haptoglobin was low. G6PD was normal.   B12, folate were normal. Haptoglobin was low, but LDH normal in Dec 2019. Serum iron saturation was normal. Cold agglutinin titer was normal/ negative. Reticulocyte was slightly elevated at 3.4%.  He has increased osmotic fragility-possibly suggestive of HS.   Bilirubin 1.6 mg/dl on 2/27/23. He has mild normocytic anemia (Hgb 12.5 g/dl) . He will  continue folic acid daily.           3. He has been treated successfully.      4. He has IgG kappa monoclonal protein on SPEP. Most recent M spike ( 6/15/20) is 1.3 g/dl. sFLC ratio 2.15. He has normal Cr, calcium and mild anemia. Bone marrow biopsy on 6/30/20 showed 10% kappa restricted plasma cells. He had normal male karyotype. He has low risk smoldering myeloma . He has new onset right low back pain. No h/o trauma. No headaches/ s/s of neuropathy. I discussed the findings with him and explained that the current standard of care is observation. Risk of progression to myeloma is variable, can be upto 15% within the first three years since diagnosis, but decreases to 3% between years three and 10, and drops to 1% at 10 years post-diagnosis.  PET CT negative for lytic lesions or other abnormalities on 10/20/20.He has mild anemia as of 2/27/23. No renal dysfunction or hypercalcemia. He will have SPEP, SHAWN, serum free light chains, serum quantitative immunoglobulins checked today.      5. Identified on BM biopsy on 6/30/20. It appears that he has a SLL/CLL clone in addition to clonal population of plasma cells.        5. On Losartan, follows with .                BMT Chart Routing      Follow up with physician 6 months.   Follow up with VALERIE    Provider visit type    Infusion scheduling note    Injection scheduling note    Labs SPEP, CBC, CMP, immunofixation, free light chains, immunoglobulins, reticulocytes and LDH   Lab interval:  spep, immunofixation, igg, igm, iga, light chains today; cbc, cmp, spep, light chains, igg, igm, iga, ldh, retic count in 6 months   Imaging    Pharmacy appointment    Other referrals

## 2023-03-08 LAB
ALBUMIN SERPL ELPH-MCNC: 4.56 G/DL (ref 3.35–5.55)
ALPHA1 GLOB SERPL ELPH-MCNC: 0.3 G/DL (ref 0.17–0.41)
ALPHA2 GLOB SERPL ELPH-MCNC: 0.58 G/DL (ref 0.43–0.99)
B-GLOBULIN SERPL ELPH-MCNC: 0.52 G/DL (ref 0.5–1.1)
GAMMA GLOB SERPL ELPH-MCNC: 1.64 G/DL (ref 0.67–1.58)
INTERPRETATION SERPL IFE-IMP: NORMAL
KAPPA LC SER QL IA: 3.22 MG/DL (ref 0.33–1.94)
KAPPA LC/LAMBDA SER IA: 3.1 (ref 0.26–1.65)
LAMBDA LC SER QL IA: 1.04 MG/DL (ref 0.57–2.63)
PROT SERPL-MCNC: 7.6 G/DL (ref 6–8.4)

## 2023-03-09 LAB
PATHOLOGIST INTERPRETATION IFE: NORMAL
PATHOLOGIST INTERPRETATION SPE: NORMAL

## 2023-03-28 ENCOUNTER — TELEPHONE (OUTPATIENT)
Dept: CARDIOLOGY | Facility: CLINIC | Age: 68
End: 2023-03-28
Payer: MEDICARE

## 2023-03-28 ENCOUNTER — OFFICE VISIT (OUTPATIENT)
Dept: CARDIOLOGY | Facility: CLINIC | Age: 68
End: 2023-03-28
Attending: FAMILY MEDICINE
Payer: MEDICARE

## 2023-03-28 VITALS
BODY MASS INDEX: 25.33 KG/M2 | WEIGHT: 167.13 LBS | HEART RATE: 76 BPM | HEIGHT: 68 IN | SYSTOLIC BLOOD PRESSURE: 122 MMHG | DIASTOLIC BLOOD PRESSURE: 78 MMHG | OXYGEN SATURATION: 99 %

## 2023-03-28 DIAGNOSIS — D59.8 OTHER ACQUIRED HEMOLYTIC ANEMIAS: ICD-10-CM

## 2023-03-28 DIAGNOSIS — Q23.1 BICUSPID AORTIC VALVE: Primary | ICD-10-CM

## 2023-03-28 DIAGNOSIS — E78.2 MIXED HYPERLIPIDEMIA: ICD-10-CM

## 2023-03-28 DIAGNOSIS — E78.5 HYPERLIPIDEMIA, UNSPECIFIED HYPERLIPIDEMIA TYPE: ICD-10-CM

## 2023-03-28 DIAGNOSIS — Z86.19 HISTORY OF HEPATITIS C: ICD-10-CM

## 2023-03-28 DIAGNOSIS — I10 HYPERTENSION, ESSENTIAL: ICD-10-CM

## 2023-03-28 DIAGNOSIS — R00.2 PALPITATIONS: Primary | ICD-10-CM

## 2023-03-28 DIAGNOSIS — R16.1 SPLENOMEGALY: ICD-10-CM

## 2023-03-28 DIAGNOSIS — D47.2 SMOLDERING MYELOMA: ICD-10-CM

## 2023-03-28 PROCEDURE — 99213 OFFICE O/P EST LOW 20 MIN: CPT | Mod: S$PBB,,, | Performed by: INTERNAL MEDICINE

## 2023-03-28 PROCEDURE — 99999 PR PBB SHADOW E&M-EST. PATIENT-LVL IV: CPT | Mod: PBBFAC,,, | Performed by: INTERNAL MEDICINE

## 2023-03-28 PROCEDURE — 99214 OFFICE O/P EST MOD 30 MIN: CPT | Mod: PBBFAC | Performed by: INTERNAL MEDICINE

## 2023-03-28 PROCEDURE — 99213 PR OFFICE/OUTPT VISIT, EST, LEVL III, 20-29 MIN: ICD-10-PCS | Mod: S$PBB,,, | Performed by: INTERNAL MEDICINE

## 2023-03-28 PROCEDURE — 99999 PR PBB SHADOW E&M-EST. PATIENT-LVL IV: ICD-10-PCS | Mod: PBBFAC,,, | Performed by: INTERNAL MEDICINE

## 2023-03-28 NOTE — PROGRESS NOTES
Subjective:    Patient ID:  Jose Sahni is a 68 y.o. male who presents for follow-up of bicuspid aortic valve    HPI  The patient is a 68 year old male with congenital heart diease. At age 8 he had a VSD closure and and  repair  dextro-transposition ???. He continues to do well and denies chest pain or LEGGETT. He walks his dog daily    Summary 2/18/22    The left ventricle is normal in size with concentric remodeling and normal systolic function.  The estimated ejection fraction is 68%.  Normal left ventricular diastolic function.  Mild left atrial enlargement.  Normal right ventricular size with normal right ventricular systolic function.  Mild right atrial enlargement.  Mild to moderate pulmonic regurgitation.  Normal central venous pressure (3 mmHg).  Trivial pericardial effusion. Under the RA.     Lab Results   Component Value Date     03/07/2023    K 5.0 03/07/2023     03/07/2023    CO2 25 03/07/2023    BUN 18 03/07/2023    CREATININE 0.9 03/07/2023    GLU 88 03/07/2023    HGBA1C <4.0 (A) 02/27/2023    AST 15 03/07/2023    ALT 15 03/07/2023    ALBUMIN 4.2 03/07/2023    PROT 7.7 03/07/2023    BILITOT 1.5 (H) 03/07/2023    WBC 8.01 03/07/2023    HGB 12.5 (L) 03/07/2023    HCT 35.7 (L) 03/07/2023    MCV 92 03/07/2023     03/07/2023    INR 1.1 08/25/2017    PSA 2.5 02/03/2022    TSH 1.84 07/11/2011         Lab Results   Component Value Date    CHOL 140 02/27/2023    HDL 39 (L) 02/27/2023    TRIG 229 (H) 02/27/2023       Lab Results   Component Value Date    LDLCALC 55.2 (L) 02/27/2023       Past Medical History:   Diagnosis Date    Allergy     Anemia of unknown etiology 12/18/2019    -followed in hematology    Asymptomatic cholelithiasis     Heart murmur     History of hepatitis C,s/p successful Rx w/ SVR24 (cure) - 6/2018 10/18/2016    GT2, tx naive  2014 liver biopsy, grade 1 stage 1 9-2017 Fibroscan = F2   Completed Epclusa 12wks w/ SVR    Hypertension     Tuberculosis     Hx postive        Current Outpatient Medications:     ascorbic acid, vitamin C, (VITAMIN C) 1000 MG tablet, Take 1,000 mg by mouth once daily., Disp: , Rfl:     betamethasone dipropionate (DIPROLENE) 0.05 % cream, AAA ears and scalp bid prn, Disp: 60 g, Rfl: 3    folic acid (FOLVITE) 1 MG tablet, Take 1 mg by mouth once daily., Disp: , Rfl:     losartan (COZAAR) 100 MG tablet, Take 1 tablet (100 mg total) by mouth once daily., Disp: 90 tablet, Rfl: 3    sildenafil (VIAGRA) 100 MG tablet, Take 1 tablet (100 mg total) by mouth daily as needed for Erectile Dysfunction., Disp: 30 tablet, Rfl: 2          Review of Systems   Constitutional: Negative for decreased appetite, diaphoresis, fever, malaise/fatigue, weight gain and weight loss.   HENT:  Negative for congestion, ear discharge, ear pain and nosebleeds.    Eyes:  Negative for blurred vision, double vision and visual disturbance.   Cardiovascular:  Negative for chest pain, claudication, cyanosis, dyspnea on exertion, irregular heartbeat, leg swelling, near-syncope, orthopnea, palpitations, paroxysmal nocturnal dyspnea and syncope.   Respiratory:  Negative for cough, hemoptysis, shortness of breath, sleep disturbances due to breathing, snoring, sputum production and wheezing.    Endocrine: Negative for polydipsia, polyphagia and polyuria.   Hematologic/Lymphatic: Negative for adenopathy and bleeding problem. Does not bruise/bleed easily.   Skin:  Negative for color change, nail changes, poor wound healing and rash.   Musculoskeletal:  Negative for muscle cramps and muscle weakness.   Gastrointestinal:  Negative for abdominal pain, anorexia, change in bowel habit, hematochezia, nausea and vomiting.   Genitourinary:  Negative for dysuria, frequency and hematuria.   Neurological:  Negative for brief paralysis, difficulty with concentration, excessive daytime sleepiness, dizziness, focal weakness, headaches, light-headedness, seizures, vertigo and weakness.   Psychiatric/Behavioral:  " Negative for altered mental status and depression.    Allergic/Immunologic: Negative for persistent infections.      Objective:/78   Pulse 76   Ht 5' 8" (1.727 m)   Wt 75.8 kg (167 lb 1.7 oz)   SpO2 99%   BMI 25.41 kg/m²             Physical Exam  Constitutional:       Appearance: Normal appearance. He is well-developed and normal weight.   HENT:      Head: Normocephalic.      Right Ear: External ear normal.      Left Ear: External ear normal.      Nose: Nose normal.   Eyes:      General: No scleral icterus.     Pupils: Pupils are equal, round, and reactive to light.   Neck:      Thyroid: No thyromegaly.      Vascular: No JVD.      Trachea: No tracheal deviation.   Cardiovascular:      Rate and Rhythm: Normal rate and regular rhythm.      Pulses: Intact distal pulses.           Carotid pulses are 2+ on the right side and 2+ on the left side.       Dorsalis pedis pulses are 2+ on the right side and 2+ on the left side.        Posterior tibial pulses are 2+ on the right side and 2+ on the left side.      Heart sounds: No murmur heard.    No friction rub. No gallop.   Pulmonary:      Effort: Pulmonary effort is normal.      Breath sounds: Normal breath sounds.   Chest:          Comments: Surgical scar  Abdominal:      General: Bowel sounds are normal. There is no distension.      Tenderness: There is no abdominal tenderness. There is no guarding.   Musculoskeletal:         General: No tenderness. Normal range of motion.      Cervical back: Normal range of motion and neck supple.   Lymphadenopathy:      Comments: Palpation of neck and groin lymph nodes normal   Skin:     General: Skin is dry.      Comments: Palpation of skin normal   Neurological:      Mental Status: He is alert and oriented to person, place, and time.      Cranial Nerves: No cranial nerve deficit.      Motor: No abnormal muscle tone.      Coordination: Coordination normal.   Psychiatric:         Behavior: Behavior normal.         Thought " Content: Thought content normal.         Judgment: Judgment normal.       Assessment:       1. Bicuspid aortic valve    2. Mixed hyperlipidemia    3. Hypertension, essential    4. Other acquired hemolytic anemias    5. Smoldering myeloma    6. History of hepatitis C,s/p successful Rx w/ SVR24 (cure) - 6/2018    7. Splenomegaly    8. Hyperlipidemia, unspecified hyperlipidemia type         Plan:       Jose was seen today for valvular heart disease.    Diagnoses and all orders for this visit:    Bicuspid aortic valve  -     Ambulatory referral/consult to Cardiology    Mixed hyperlipidemia    Hypertension, essential  -     CBC Auto Differential; Future; Expected date: 03/27/2024  -     Comprehensive Metabolic Panel; Future; Expected date: 03/27/2024    Other acquired hemolytic anemias    Smoldering myeloma    History of hepatitis C,s/p successful Rx w/ SVR24 (cure) - 6/2018    Splenomegaly    Hyperlipidemia, unspecified hyperlipidemia type  -     Lipid Panel; Future; Expected date: 03/27/2024

## 2023-10-12 ENCOUNTER — LAB VISIT (OUTPATIENT)
Dept: LAB | Facility: HOSPITAL | Age: 68
End: 2023-10-12
Payer: MEDICARE

## 2023-10-12 ENCOUNTER — OFFICE VISIT (OUTPATIENT)
Dept: HEMATOLOGY/ONCOLOGY | Facility: CLINIC | Age: 68
End: 2023-10-12
Payer: MEDICARE

## 2023-10-12 VITALS
HEIGHT: 68 IN | DIASTOLIC BLOOD PRESSURE: 69 MMHG | HEART RATE: 77 BPM | BODY MASS INDEX: 25.33 KG/M2 | OXYGEN SATURATION: 99 % | TEMPERATURE: 99 F | RESPIRATION RATE: 16 BRPM | WEIGHT: 167.13 LBS | SYSTOLIC BLOOD PRESSURE: 154 MMHG

## 2023-10-12 DIAGNOSIS — I10 HYPERTENSION, ESSENTIAL: Primary | ICD-10-CM

## 2023-10-12 DIAGNOSIS — D59.8 OTHER ACQUIRED HEMOLYTIC ANEMIAS: ICD-10-CM

## 2023-10-12 DIAGNOSIS — D47.2 SMOLDERING MYELOMA: ICD-10-CM

## 2023-10-12 DIAGNOSIS — D58.0 HEREDITARY SPHEROCYTIC HEMOLYTIC ANEMIA: Chronic | ICD-10-CM

## 2023-10-12 DIAGNOSIS — D47.Z9 LOW GRADE B CELL LYMPHOPROLIFERATIVE DISORDER: ICD-10-CM

## 2023-10-12 DIAGNOSIS — E78.2 MIXED HYPERLIPIDEMIA: ICD-10-CM

## 2023-10-12 LAB
ALBUMIN SERPL BCP-MCNC: 4.4 G/DL (ref 3.5–5.2)
ALP SERPL-CCNC: 58 U/L (ref 55–135)
ALT SERPL W/O P-5'-P-CCNC: 16 U/L (ref 10–44)
ANION GAP SERPL CALC-SCNC: 6 MMOL/L (ref 8–16)
AST SERPL-CCNC: 17 U/L (ref 10–40)
BASOPHILS # BLD AUTO: 0.04 K/UL (ref 0–0.2)
BASOPHILS NFR BLD: 0.6 % (ref 0–1.9)
BILIRUB SERPL-MCNC: 2.1 MG/DL (ref 0.1–1)
BUN SERPL-MCNC: 21 MG/DL (ref 8–23)
CALCIUM SERPL-MCNC: 9.6 MG/DL (ref 8.7–10.5)
CHLORIDE SERPL-SCNC: 109 MMOL/L (ref 95–110)
CO2 SERPL-SCNC: 25 MMOL/L (ref 23–29)
CREAT SERPL-MCNC: 1.4 MG/DL (ref 0.5–1.4)
DIFFERENTIAL METHOD: ABNORMAL
EOSINOPHIL # BLD AUTO: 0.1 K/UL (ref 0–0.5)
EOSINOPHIL NFR BLD: 1 % (ref 0–8)
ERYTHROCYTE [DISTWIDTH] IN BLOOD BY AUTOMATED COUNT: 13.3 % (ref 11.5–14.5)
EST. GFR  (NO RACE VARIABLE): 55.1 ML/MIN/1.73 M^2
GLUCOSE SERPL-MCNC: 97 MG/DL (ref 70–110)
HCT VFR BLD AUTO: 34.7 % (ref 40–54)
HGB BLD-MCNC: 12.4 G/DL (ref 14–18)
IMM GRANULOCYTES # BLD AUTO: 0.02 K/UL (ref 0–0.04)
IMM GRANULOCYTES NFR BLD AUTO: 0.3 % (ref 0–0.5)
LDH SERPL L TO P-CCNC: 129 U/L (ref 110–260)
LYMPHOCYTES # BLD AUTO: 1.5 K/UL (ref 1–4.8)
LYMPHOCYTES NFR BLD: 21.2 % (ref 18–48)
MCH RBC QN AUTO: 33.2 PG (ref 27–31)
MCHC RBC AUTO-ENTMCNC: 35.7 G/DL (ref 32–36)
MCV RBC AUTO: 93 FL (ref 82–98)
MONOCYTES # BLD AUTO: 0.5 K/UL (ref 0.3–1)
MONOCYTES NFR BLD: 6.7 % (ref 4–15)
NEUTROPHILS # BLD AUTO: 5.1 K/UL (ref 1.8–7.7)
NEUTROPHILS NFR BLD: 70.2 % (ref 38–73)
NRBC BLD-RTO: 0 /100 WBC
PLATELET # BLD AUTO: 157 K/UL (ref 150–450)
PMV BLD AUTO: 10 FL (ref 9.2–12.9)
POTASSIUM SERPL-SCNC: 5 MMOL/L (ref 3.5–5.1)
PROT SERPL-MCNC: 8.1 G/DL (ref 6–8.4)
RBC # BLD AUTO: 3.74 M/UL (ref 4.6–6.2)
RETICS/RBC NFR AUTO: 4.2 % (ref 0.4–2)
SODIUM SERPL-SCNC: 140 MMOL/L (ref 136–145)
WBC # BLD AUTO: 7.27 K/UL (ref 3.9–12.7)

## 2023-10-12 PROCEDURE — 99213 OFFICE O/P EST LOW 20 MIN: CPT | Mod: PBBFAC | Performed by: INTERNAL MEDICINE

## 2023-10-12 PROCEDURE — 99214 OFFICE O/P EST MOD 30 MIN: CPT | Mod: S$PBB,,, | Performed by: INTERNAL MEDICINE

## 2023-10-12 PROCEDURE — 99214 PR OFFICE/OUTPT VISIT, EST, LEVL IV, 30-39 MIN: ICD-10-PCS | Mod: S$PBB,,, | Performed by: INTERNAL MEDICINE

## 2023-10-12 PROCEDURE — 80053 COMPREHEN METABOLIC PANEL: CPT | Performed by: INTERNAL MEDICINE

## 2023-10-12 PROCEDURE — 83615 LACTATE (LD) (LDH) ENZYME: CPT | Performed by: INTERNAL MEDICINE

## 2023-10-12 PROCEDURE — 99999 PR PBB SHADOW E&M-EST. PATIENT-LVL III: ICD-10-PCS | Mod: PBBFAC,,, | Performed by: INTERNAL MEDICINE

## 2023-10-12 PROCEDURE — 85025 COMPLETE CBC W/AUTO DIFF WBC: CPT | Performed by: INTERNAL MEDICINE

## 2023-10-12 PROCEDURE — 99999 PR PBB SHADOW E&M-EST. PATIENT-LVL III: CPT | Mod: PBBFAC,,, | Performed by: INTERNAL MEDICINE

## 2023-10-12 PROCEDURE — 85045 AUTOMATED RETICULOCYTE COUNT: CPT | Performed by: INTERNAL MEDICINE

## 2023-10-12 PROCEDURE — 36415 COLL VENOUS BLD VENIPUNCTURE: CPT | Performed by: INTERNAL MEDICINE

## 2023-10-12 NOTE — PROGRESS NOTES
CC: Hemolytic anemia, smoldering myeloma, B cell lymphoproliferative disorder, follow up     HPI: Mr. Sahni, 68, is here for a follow up visit for anemia. He has chronic hepatitis C, chronic anemia, gall stones, HTn.  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 He was being followed here previously by Dr.Archie Sahni, who last saw him in 2013. His CBC and other labs ( bilirubin, retic count) at that time suggested that he had hemolytic anemia or compensated hemolysis.  CBC from 11/2012 showed hemoglobin of 13.9 and  MCV was 89.  His indirect bilirubin has been elevated.  He had numerous gallstones and he has splenomegaly.  Hemoglobin electrophoresis was normal. G6PD was normal. JUDD was negative. Haptoglobin was low. Osmotic fragility was ordered, but was not done.   There is no family h/o anemia/ other hematologic condition.        He had a bone marrow biopsy on 6/30/20. There was a kappa light chain restricted plasma cell population (10% total cellularity) and a kappa light chain restricted B-cell population (<5% total cellularity).   The overall findings favor a plasma cell neoplasm and a concurrent B-cell lymphoproliferative disorder with chronic lymphocytic leukemia/small lymphocytic lymphoma immunophenotype.     Interval History: Denies yellowing of skin / eyes. No abdominal pain, fatigue, weight loss or change of appetite. No h/o discolored urine. He has back pain. No recent fever.       Review of Systems   Constitutional:  Positive for malaise/fatigue. Negative for fever and weight loss.   HENT:  Negative for ear pain, hearing loss and nosebleeds.    Eyes:  Negative for blurred vision, double vision, photophobia and pain.   Respiratory:  Negative for cough and shortness of breath.    Cardiovascular:  Negative for chest pain, palpitations and orthopnea.   Gastrointestinal:  Negative for abdominal pain, diarrhea, heartburn, melena, nausea and vomiting.   Genitourinary:  Negative for dysuria and  frequency.   Musculoskeletal:  Negative for back pain and joint pain.   Skin:  Negative for rash.   Neurological:  Negative for tingling, tremors, sensory change, speech change and headaches.   Endo/Heme/Allergies:  Does not bruise/bleed easily.   Psychiatric/Behavioral:  Negative for depression, hallucinations, substance abuse and suicidal ideas. The patient is not nervous/anxious.          Current Outpatient Medications   Medication Sig    ascorbic acid, vitamin C, (VITAMIN C) 1000 MG tablet Take 1,000 mg by mouth once daily.    betamethasone dipropionate (DIPROLENE) 0.05 % cream AAA ears and scalp bid prn    folic acid (FOLVITE) 1 MG tablet Take 1 mg by mouth once daily.    losartan (COZAAR) 100 MG tablet TAKE 1 TABLET BY MOUTH EVERY DAY    sildenafil (VIAGRA) 100 MG tablet Take 1 tablet (100 mg total) by mouth daily as needed for Erectile Dysfunction.     No current facility-administered medications for this visit.       Vitals:    10/12/23 1333   BP: (!) 154/69   Pulse: 77   Resp: 16   Temp: 98.5 °F (36.9 °C)             Physical Exam  Constitutional:       Appearance: Normal appearance. He is not ill-appearing.   HENT:      Mouth/Throat:      Pharynx: No oropharyngeal exudate.   Eyes:      General: No scleral icterus.  Cardiovascular:      Rate and Rhythm: Normal rate and regular rhythm.      Pulses: Normal pulses.      Heart sounds: Murmur heard.   Pulmonary:      Effort: Pulmonary effort is normal.      Breath sounds: Normal breath sounds. No stridor. No rhonchi.   Abdominal:      General: There is no distension.      Palpations: There is no mass.      Tenderness: There is no abdominal tenderness.   Neurological:      General: No focal deficit present.      Mental Status: He is alert and oriented to person, place, and time.           Ref Range & Units 6yr ago   Hgb A2 Quant 1.5 - 3.8 % 3.1    Hemoglobin Bands   Hb A and Hb A2    Hemoglobin Electrophoresis Interp   Normal       Component      Latest Ref Rng &  Units 9/3/2013   Direct Lety (JUDD)       NEG   G6PD Quant      7.0 - 20.5 U/G Hg 13.9   Haptoglobin      30 - 250 mg/dL <10 (L)      12/18/19 serum SHAWN: IgG kappa specific monoclonal band in gamma.       12/18/19 osmotic fragility: Increased erythrocyte osmotic fragility. These results may be due to spherocytosis of any cause, such as hereditary spherocytosis, immune hemolytic anemia, or may reflect the presence of spherocytes that commonly are observed following blood transfusion. Some cases of congenital nonspherocytic hemolytic anemia may also be associated with increased osmotic fragility in this test. These include congenital hemolytic anemia due to pyruvate kinase   deficiency or glucose-6-phosphate dehydrogenase deficiency or unstable hemoglobin hemolytic anemia. Some medications may cause an abnormal osmotic fragility. Results must be   interpreted in the context of other clinical and laboratory data, including examination of close relatives (parents, siblings, children) to determine mode of inheritance,   results of Lety test, and erythrocyte morphology. EMA binding (band 3 fluorescence staining) flow cytometry testing is available as a component of the RBC Membrane Evaluation (RBCME).             6/15/20 SPEP :Normal total protein. Normal gamma globulins are decreased. There is a monoclonal protein peak in gamma measuring 1.30 g/dL, previously 1.18 g/dL.         6/30/20 BONE MARROW, LEFT ILIAC CREST (ASPIRATE SMEAR, TOUCH PREPARATION, CLOT SECTION,AND CORE BIOPSY):     -- PLASMA CELL NEOPLASM.  -- B-CELL LYMPHOPROLIFERATIVE DISORDER WITH CHRONIC LYMPHOCYTIC LEUKEMIA/SMALL  LYMPHOCYTIC LYMPHOMA IMMUNOPHENOTYPE.  -- NORMOCELLULAR MARROW (40%) WITH TRILINEAGE HEMATOPOIESIS.  -- ADEQUATE IRON STORAGE.  -- SEE COMMENT.        Comments  Flow cytometric analysis of bone marrow detects a kappa light chain restricted plasma cell population that expresses , CD38, CD56 (dim), and CD45 (dim). It is negative  for CD19 and CD20. In addition, there  is a kappa light chain restricted B lymphocyte population showing expression of CD19, CD20 (dim), CD5 (dim), and CD23 (partial). It is negative for CD38 and FMC-7. T lymphocytes are immunophenotypically  unremarkable. The blasts gate is not increased. Flow differential: Lymphocytes 9.7%, Monocytes 3.5%,Granulocytes 78.8%, Blast 2.5%, Debris/nRBC 5.6%, Viability 94.7%.  Immunohistochemical stains are performed on the biopsy core for greater sensitivity and further architectural.   There is a paratrabecular lymphoid aggregate containing predominantly CD20  positive B cells. The B-cells are positive for CD5, CD23, and LEF1; negative for cyclin D1 and SOX11.  Scattered CD3-positive T-cells are seen.  Taken together, there is a kappa light chain restricted plasma cell population (10% total cellularity) and a kappa light chain restricted B-cell population (<5% total cellularity). Although rare CLL/SLL with plasmacytic  differentiation has been described in literature, the overall findings favor a plasma cell neoplasm and a concurrent B-cell lymphoproliferative disorder with chronic lymphocytic leukemia/small lymphocytic  lymphoma immunophenotype. Correlation with clinical presentation and other laboratory results is required.sessment with adequate controls. -positive plasma cells comprise approximately  10% of the total cellularity.      Component      Latest Ref Rng & Units 6/30/2020   Chromosome analysis BM Result Summary       Normal   Interpretation       SEE BELOW   Results       46,XY[20]   Reason for Referral       D47.2  Monoclonal gammopathy   Specimen       Bone Marrow   Source       Test Not Performed   Method       Culture without mitogens   Banding Methods, BM Chromosome       SEE BELOW   Chromosome analysis BM Additional Information       Test Not Performed         10/20/20 PET CT    Impression:     No evidence of abnormal uptake to suggest active osseous disease.   No evidence of extramedullary disease.     Cholelithiasis without evidence of acute cholecystitis.           3/7/23 SPEP: Normal total protein. Normal gamma globulins are decreased. Paraprotein peak in gamma = 1.28 g/dL (previously, 1.15 g/dL).   3/7/23 Sife : IgG kappa specific monoclonal protein band in gamma identified.    Component      Latest Ref Rng 3/7/2023   [place]a Free Light Chains      0.33 - 1.94 mg/dL 3.22 (H)    Lambda Free Light Chains      0.57 - 2.63 mg/dL 1.04    Kappa/Lambda FLC Ratio      0.26 - 1.65  3.10 (H)       Legend:  (H) High      Assessment:     1. Chronic anemia  2. Gallstones  3. H/o hepatitis C  4. Smoldering myeloma  5. B cell lymphoproliferative disorder  6. Essential HTN        Plan:     1. He had multiple gall stones, low haptoglobin, mild conjugated+ unconjugated hyperbilirubinemia, splenomegaly, reticulocytosis in 2013--all suggestive for a hemolytic process. He has never been transfused. No clear family h/o anemia. He denies pruritus, discolored urine, abdominal pain, fatigue now. He had normal hemoglobin electrophoresis in 2013. [facility] screen was normal. JUDD was negative. LDH was normal. Haptoglobin was low. G6PD was normal.   B12, folate were normal. Haptoglobin was low, but LDH normal in Dec 2019. Serum iron saturation was normal. Cold agglutinin titer was normal/ negative. Reticulocyte was slightly elevated at 3.4%.  He has increased osmotic fragility-possibly suggestive of HS.   Bilirubin 1.6 mg/dl on 2/27/23. He has mild normocytic anemia (Hgb 12.5 g/dl) . He will continue folic acid daily.           3. He has been treated successfully.      4. He has IgG kappa monoclonal protein on SPEP. M spike on  6/15/20  SPEP was 1.3 g/dl. sFLC ratio 2.15. He had normal Cr, calcium and mild anemia. Bone marrow biopsy on 6/30/20 showed 10% kappa restricted plasma cells. He had normal male karyotype. He has low risk smoldering myeloma . He has new onset right low back pain. No h/o trauma.  No headaches/ s/s of neuropathy. I discussed the findings with him and explained that the current standard of care is observation. Risk of progression to myeloma is variable, can be upto 15% within the first three years since diagnosis, but decreases to 3% between years three and 10, and drops to 1% at 10 years post-diagnosis.  PET CT negative for lytic lesions or other abnormalities on 10/20/20.He has mild anemia as of 2/27/23. No renal dysfunction or hypercalcemia.  SPEP on 3/7/23 showed M spike 1.28g/dl,  sFLC ratio was 3.10.     5. Identified on BM biopsy on 6/30/20. It appears that he has a SLL/CLL clone in addition to clonal population of plasma cells.        5. On Losartan, follows with .           BMT Chart Routing      Follow up with physician    Follow up with VALERIE    Provider visit type    Infusion scheduling note    Injection scheduling note    Labs CBC, CMP, LDH and reticulocytes   Scheduling:  Preferred lab:  Lab interval:  cbc, cmp, ldh, reti cocunt tofay; cbc, cmp, ldh, ret ccoumnt, spep, light chains in 6 months   Imaging    Pharmacy appointment    Other referrals

## 2024-01-11 DIAGNOSIS — Z00.00 ENCOUNTER FOR MEDICARE ANNUAL WELLNESS EXAM: ICD-10-CM

## 2024-01-23 ENCOUNTER — TELEPHONE (OUTPATIENT)
Dept: DERMATOLOGY | Facility: CLINIC | Age: 69
End: 2024-01-23
Payer: MEDICARE

## 2024-01-23 ENCOUNTER — TELEPHONE (OUTPATIENT)
Dept: UROLOGY | Facility: CLINIC | Age: 69
End: 2024-01-23
Payer: MEDICARE

## 2024-01-23 NOTE — TELEPHONE ENCOUNTER
Informed pt that eloenora segovia is no longer with [facility] to call [phone] to schedule appointment with another provider.  M manuela lpn

## 2024-01-23 NOTE — TELEPHONE ENCOUNTER
----- Message from Heena Colvin sent at 1/23/2024 10:54 AM CST -----  Regarding: pt called  Name of Who is Calling: BOO GROSS [[id number]]      What is the request in detail: pt is requesting to be seen for some suspicious spots and skin screening. Please advise       Can the clinic reply by [facility]: No      What Number to Call Back if not in [id number]: Telephone Information:  Mobile          [phone]

## 2024-03-01 ENCOUNTER — LAB VISIT (OUTPATIENT)
Dept: LAB | Facility: HOSPITAL | Age: 69
End: 2024-03-01
Payer: MEDICARE

## 2024-03-01 ENCOUNTER — OFFICE VISIT (OUTPATIENT)
Dept: UROLOGY | Facility: CLINIC | Age: 69
End: 2024-03-01
Payer: MEDICARE

## 2024-03-01 ENCOUNTER — OFFICE VISIT (OUTPATIENT)
Dept: INTERNAL MEDICINE | Facility: CLINIC | Age: 69
End: 2024-03-01
Payer: MEDICARE

## 2024-03-01 VITALS
HEART RATE: 75 BPM | BODY MASS INDEX: 24.99 KG/M2 | SYSTOLIC BLOOD PRESSURE: 152 MMHG | DIASTOLIC BLOOD PRESSURE: 59 MMHG | WEIGHT: 164.88 LBS | HEIGHT: 68 IN

## 2024-03-01 VITALS
BODY MASS INDEX: 24.99 KG/M2 | OXYGEN SATURATION: 99 % | HEART RATE: 86 BPM | SYSTOLIC BLOOD PRESSURE: 140 MMHG | DIASTOLIC BLOOD PRESSURE: 60 MMHG | WEIGHT: 164.88 LBS | HEIGHT: 68 IN

## 2024-03-01 DIAGNOSIS — D58.0 HEREDITARY SPHEROCYTIC HEMOLYTIC ANEMIA: Chronic | ICD-10-CM

## 2024-03-01 DIAGNOSIS — D59.8 OTHER ACQUIRED HEMOLYTIC ANEMIAS: ICD-10-CM

## 2024-03-01 DIAGNOSIS — D47.Z9 LOW GRADE B CELL LYMPHOPROLIFERATIVE DISORDER: ICD-10-CM

## 2024-03-01 DIAGNOSIS — E78.2 MIXED HYPERLIPIDEMIA: ICD-10-CM

## 2024-03-01 DIAGNOSIS — Q23.1 BICUSPID AORTIC VALVE: ICD-10-CM

## 2024-03-01 DIAGNOSIS — N40.1 BPH WITH URINARY OBSTRUCTION: Primary | ICD-10-CM

## 2024-03-01 DIAGNOSIS — Z29.11 NEED FOR RSV IMMUNIZATION: ICD-10-CM

## 2024-03-01 DIAGNOSIS — Z23 NEED FOR INFLUENZA VACCINATION: ICD-10-CM

## 2024-03-01 DIAGNOSIS — I10 HYPERTENSION, ESSENTIAL: ICD-10-CM

## 2024-03-01 DIAGNOSIS — Z12.5 ENCOUNTER FOR SCREENING FOR MALIGNANT NEOPLASM OF PROSTATE: ICD-10-CM

## 2024-03-01 DIAGNOSIS — N40.1 BPH WITH URINARY OBSTRUCTION: ICD-10-CM

## 2024-03-01 DIAGNOSIS — I10 HYPERTENSION, ESSENTIAL: Primary | ICD-10-CM

## 2024-03-01 DIAGNOSIS — N40.0 BENIGN PROSTATIC HYPERPLASIA, UNSPECIFIED WHETHER LOWER URINARY TRACT SYMPTOMS PRESENT: ICD-10-CM

## 2024-03-01 DIAGNOSIS — D47.2 SMOLDERING MYELOMA: ICD-10-CM

## 2024-03-01 DIAGNOSIS — R79.9 ABNORMAL FINDING OF BLOOD CHEMISTRY, UNSPECIFIED: ICD-10-CM

## 2024-03-01 DIAGNOSIS — N13.8 BPH WITH URINARY OBSTRUCTION: Primary | ICD-10-CM

## 2024-03-01 DIAGNOSIS — Z23 NEED FOR PNEUMOCOCCAL VACCINATION: ICD-10-CM

## 2024-03-01 DIAGNOSIS — N13.8 BPH WITH URINARY OBSTRUCTION: ICD-10-CM

## 2024-03-01 LAB
ALBUMIN SERPL BCP-MCNC: 4.5 G/DL (ref 3.5–5.2)
ALP SERPL-CCNC: 62 U/L (ref 55–135)
ALT SERPL W/O P-5'-P-CCNC: 29 U/L (ref 10–44)
ANION GAP SERPL CALC-SCNC: 10 MMOL/L (ref 8–16)
AST SERPL-CCNC: 24 U/L (ref 10–40)
BASOPHILS # BLD AUTO: 0.03 K/UL (ref 0–0.2)
BASOPHILS NFR BLD: 0.5 % (ref 0–1.9)
BILIRUB SERPL-MCNC: 2.1 MG/DL (ref 0.1–1)
BUN SERPL-MCNC: 22 MG/DL (ref 8–23)
CALCIUM SERPL-MCNC: 9.7 MG/DL (ref 8.7–10.5)
CHLORIDE SERPL-SCNC: 108 MMOL/L (ref 95–110)
CHOLEST SERPL-MCNC: 107 MG/DL (ref 120–199)
CHOLEST/HDLC SERPL: 4 {RATIO} (ref 2–5)
CO2 SERPL-SCNC: 19 MMOL/L (ref 23–29)
COMPLEXED PSA SERPL-MCNC: 3.3 NG/ML (ref 0–4)
CREAT SERPL-MCNC: 1 MG/DL (ref 0.5–1.4)
DIFFERENTIAL METHOD BLD: ABNORMAL
EOSINOPHIL # BLD AUTO: 0.1 K/UL (ref 0–0.5)
EOSINOPHIL NFR BLD: 1 % (ref 0–8)
ERYTHROCYTE [DISTWIDTH] IN BLOOD BY AUTOMATED COUNT: 14.1 % (ref 11.5–14.5)
EST. GFR  (NO RACE VARIABLE): >60 ML/MIN/1.73 M^2
ESTIMATED AVG GLUCOSE: 68 MG/DL (ref 68–131)
GLUCOSE SERPL-MCNC: 96 MG/DL (ref 70–110)
HBA1C MFR BLD: 4 % (ref 4–5.6)
HCT VFR BLD AUTO: 32 % (ref 40–54)
HDLC SERPL-MCNC: 27 MG/DL (ref 40–75)
HDLC SERPL: 25.2 % (ref 20–50)
HGB BLD-MCNC: 11.1 G/DL (ref 14–18)
IMM GRANULOCYTES # BLD AUTO: 0.02 K/UL (ref 0–0.04)
IMM GRANULOCYTES NFR BLD AUTO: 0.3 % (ref 0–0.5)
LDLC SERPL CALC-MCNC: 58.4 MG/DL (ref 63–159)
LYMPHOCYTES # BLD AUTO: 1.9 K/UL (ref 1–4.8)
LYMPHOCYTES NFR BLD: 32.1 % (ref 18–48)
MCH RBC QN AUTO: 32.7 PG (ref 27–31)
MCHC RBC AUTO-ENTMCNC: 34.7 G/DL (ref 32–36)
MCV RBC AUTO: 94 FL (ref 82–98)
MONOCYTES # BLD AUTO: 0.4 K/UL (ref 0.3–1)
MONOCYTES NFR BLD: 7.6 % (ref 4–15)
NEUTROPHILS # BLD AUTO: 3.4 K/UL (ref 1.8–7.7)
NEUTROPHILS NFR BLD: 58.5 % (ref 38–73)
NONHDLC SERPL-MCNC: 80 MG/DL
NRBC BLD-RTO: 0 /100 WBC
PLATELET # BLD AUTO: 146 K/UL (ref 150–450)
PMV BLD AUTO: 10.8 FL (ref 9.2–12.9)
POTASSIUM SERPL-SCNC: 4.8 MMOL/L (ref 3.5–5.1)
PROT SERPL-MCNC: 8.1 G/DL (ref 6–8.4)
RBC # BLD AUTO: 3.39 M/UL (ref 4.6–6.2)
SODIUM SERPL-SCNC: 137 MMOL/L (ref 136–145)
TRIGL SERPL-MCNC: 108 MG/DL (ref 30–150)
TSH SERPL DL<=0.005 MIU/L-ACNC: 2.15 UIU/ML (ref 0.4–4)
WBC # BLD AUTO: 5.8 K/UL (ref 3.9–12.7)

## 2024-03-01 PROCEDURE — 99999 PR PBB SHADOW E&M-EST. PATIENT-LVL III: CPT | Mod: PBBFAC,,,

## 2024-03-01 PROCEDURE — 99214 OFFICE O/P EST MOD 30 MIN: CPT | Mod: S$PBB,,, | Performed by: NURSE PRACTITIONER

## 2024-03-01 PROCEDURE — 99999 PR PBB SHADOW E&M-EST. PATIENT-LVL IV: CPT | Mod: PBBFAC,,, | Performed by: NURSE PRACTITIONER

## 2024-03-01 PROCEDURE — 84443 ASSAY THYROID STIM HORMONE: CPT | Performed by: NURSE PRACTITIONER

## 2024-03-01 PROCEDURE — 83036 HEMOGLOBIN GLYCOSYLATED A1C: CPT | Performed by: NURSE PRACTITIONER

## 2024-03-01 PROCEDURE — 80061 LIPID PANEL: CPT | Performed by: NURSE PRACTITIONER

## 2024-03-01 PROCEDURE — 85025 COMPLETE CBC W/AUTO DIFF WBC: CPT | Performed by: NURSE PRACTITIONER

## 2024-03-01 PROCEDURE — 99214 OFFICE O/P EST MOD 30 MIN: CPT | Mod: PBBFAC | Performed by: NURSE PRACTITIONER

## 2024-03-01 PROCEDURE — 84153 ASSAY OF PSA TOTAL: CPT

## 2024-03-01 PROCEDURE — 80053 COMPREHEN METABOLIC PANEL: CPT | Performed by: NURSE PRACTITIONER

## 2024-03-01 PROCEDURE — 99214 OFFICE O/P EST MOD 30 MIN: CPT | Mod: S$PBB,,,

## 2024-03-01 PROCEDURE — 36415 COLL VENOUS BLD VENIPUNCTURE: CPT

## 2024-03-01 PROCEDURE — 99213 OFFICE O/P EST LOW 20 MIN: CPT | Mod: PBBFAC,27

## 2024-03-01 NOTE — PROGRESS NOTES
CHIEF COMPLAINT:  Annual      HISTORY OF PRESENTING ILLINESS:  Jose Sahni is a 69 y.o. male with a PMH of htn, hx hep C (SVR24), smoldering myeloma who presents for annual visit. He is established with urology. No changes in urination. Due for PSA.     Previously evaluated for rising PSA. Had PSA drawn 2/2022 2.5 previous year 1.4.  Paternal cousin with prostate cancer. Recent PSA 2.1, which is stable. Recommend continued monitoring yearly.     He reports a good urinary stream and complete bladder emptying.  Reports having nocturia 2x.  Also having some hesitancy during the day. These symptoms are not bothersome to him.     Has history of erectile dysfunction.  Prescribed viagra, but does not use it.  Currently not an issue for him.      REVIEW OF SYSTEMS:  Review of Systems   Constitutional:  Negative for chills and fever.   HENT:  Negative for congestion and sore throat.    Respiratory:  Negative for cough and shortness of breath.    Cardiovascular:  Negative for chest pain and palpitations.   Gastrointestinal:  Negative for nausea and vomiting.   Genitourinary:  Negative for dysuria, flank pain, frequency, hematuria and urgency.   Neurological:  Negative for dizziness and headaches.         PATIENT HISTORY:    Past Medical History:   Diagnosis Date    Allergy     Anemia of unknown etiology 12/18/2019    -followed in hematology    Asymptomatic cholelithiasis     Heart murmur     History of hepatitis C,s/p successful Rx w/ SVR24 (cure) - 6/2018 10/18/2016    GT2, tx naive  2014 liver biopsy, grade 1 stage 1 9-2017 Fibroscan = F2   Completed Epclusa 12wks w/ SVR    Hypertension     Tuberculosis     Hx postive       Past Surgical History:   Procedure Laterality Date    CARDIAC SURGERY  01/01/1962    COLONOSCOPY N/A 09/27/2022    Procedure: COLONOSCOPY;  Surgeon: Antonio Russo MD;  Location: [facility] ([address] [place] [address]);  Service: Endoscopy;  Laterality: N/A;  Not vaccinated. will do home test and notify of results.EC     FRACTURE SURGERY  Jan 2009    Athrodesis    HAND SURGERY  01/01/2008    Left hand    HERNIA REPAIR  01/01/1967    orchiopexy, left  age 12       Family History   Problem Relation Age of Onset    Stroke Mother     Heart disease Father     Cancer Paternal Aunt     Cancer Paternal Uncle     Early death Paternal Cousin     Melanoma Neg Hx        Social History     Socioeconomic History    Marital status:    Tobacco Use    Smoking status: Never    Smokeless tobacco: Never   Substance and Sexual Activity    Alcohol use: Yes     Alcohol/week: 24.0 standard drinks of alcohol     Types: 24 Cans of beer per week    Drug use: No    Sexual activity: Not Currently     Partners: Female     Comment:  20 years     Social Determinants of Health     Financial Resource Strain: Low Risk  (2/28/2024)    Overall Financial Resource Strain (CARDIA)     Difficulty of Paying Living Expenses: Not hard at all   Food Insecurity: No Food Insecurity (2/28/2024)    Hunger Vital Sign     Worried About Running Out of Food in the Last Year: Never true     Ran Out of Food in the Last Year: Never true   Transportation Needs: No Transportation Needs (2/28/2024)    PRAPARE - Transportation     Lack of Transportation (Medical): No     Lack of Transportation (Non-Medical): No   Physical Activity: Sufficiently Active (2/28/2024)    Exercise Vital Sign     Days of Exercise per Week: 7 days     Minutes of Exercise per Session: 40 min   Stress: No Stress Concern Present (2/28/2024)    [employer] - Occupational Stress Questionnaire     Feeling of Stress : Not at all   Social Connections: Unknown (2/28/2024)    Social Connection and Isolation Panel [NHANES]     Frequency of Communication with Friends and Family: More than three times a week     Frequency of Social Gatherings with Friends and Family: Once a week     Active Member of Clubs or Organizations: No     Attends Club or Organization Meetings: Never     Marital  Status:    Housing Stability: Low Risk  (2/28/2024)    Housing Stability Vital Sign     Unable to Pay for Housing in the Last Year: No     Number of Places Lived in the Last Year: 1     Unstable Housing in the Last Year: No       Allergies:  Benazepril hcl    Medications:    Current Outpatient Medications:     ascorbic acid, vitamin C, (VITAMIN C) 1000 MG tablet, Take 1,000 mg by mouth once daily., Disp: , Rfl:     betamethasone dipropionate (DIPROLENE) 0.05 % cream, AAA ears and scalp bid prn, Disp: 60 g, Rfl: 3    folic acid (FOLVITE) 1 MG tablet, Take 1 mg by mouth once daily., Disp: , Rfl:     losartan (COZAAR) 100 MG tablet, TAKE 1 TABLET BY MOUTH EVERY DAY, Disp: 90 tablet, Rfl: 3    sildenafil (VIAGRA) 100 MG tablet, Take 1 tablet (100 mg total) by mouth daily as needed for Erectile Dysfunction., Disp: 30 tablet, Rfl: 2    PHYSICAL EXAMINATION:  Physical Exam  Constitutional:       Appearance: Normal appearance.   HENT:      Head: Normocephalic and atraumatic.      Right Ear: External ear normal.      Left Ear: External ear normal.      Nose: Nose normal.      Mouth/Throat:      Mouth: Mucous membranes are moist.   Pulmonary:      Effort: Pulmonary effort is normal. No respiratory distress.   Skin:     General: Skin is warm and dry.   Neurological:      General: No focal deficit present.      Mental Status: He is alert and oriented to person, place, and time.   Psychiatric:         Mood and Affect: Mood normal.         Behavior: Behavior normal.           Lab Results   Component Value Date    PSA 2.5 02/03/2022    PSA 1.4 12/04/2020    PSA 1.4 11/13/2019    PSADIAG 2.2 02/27/2023    PSADIAG 2.1 03/08/2022       Lab Results   Component Value Date    CREATININE 1.4 10/12/2023    EGFRNORACEVR 55.1 (A) 10/12/2023         IMPRESSION:  Encounter Diagnoses   Name Primary?    BPH with urinary obstruction Yes         Assessment:       1. BPH with urinary obstruction        Plan:   - Continue to monitor LUTS -  not bothersome.   - PSA today, to be linked with PCP labs. Will call with PSA result.     RTC dependent on PSA     I spent 30 minutes with the patient of which more than half was spent in direct consultation with the patient in regards to our treatment and plan.  We addressed the office findings and recent labs.   Education and recommendations of today's plan of care including home remedies and needed follow up with PCP.   We discussed the chief complaint; reviewed the LUTS and the possible contributory factors.

## 2024-03-01 NOTE — PATIENT INSTRUCTIONS
Check labs today, will message with results, if stable repeat in one year with annual    Declines vaccines    BP elevated today    Continue meds    Take medications as prescribed.    Monitor BP at home, goal BP < or = 140/80, call office if consistently above this range.    Follow low salt DASH diet and exercise.    BMI reviewed.    Go to ED if Headaches, blurred vision, chest pain, or SOB occurs along with elevated readings > or = 160/90.

## 2024-03-01 NOTE — PROGRESS NOTES
Subjective     Patient ID: Jose Sahni is a 69 y.o. male.    Chief Complaint: Annual Exam    Pt of Dr. Cordero, here for annual    Seen by urology for BPH this morning, would like PSA they ordered link to my labs ordered today    I have reviewed the HPI/ROS info pt entered in portal prior to visit today below       Review of Systems   Constitutional:  Negative for activity change, appetite change and unexpected weight change.   HENT:  Positive for rhinorrhea. Negative for hearing loss, trouble swallowing and voice change.    Eyes:  Negative for discharge and visual disturbance.   Respiratory:  Negative for apnea, cough, chest tightness, shortness of breath and wheezing.    Cardiovascular:  Negative for chest pain, palpitations and leg swelling.   Gastrointestinal:  Negative for abdominal distention, abdominal pain, blood in stool, constipation, diarrhea, nausea and vomiting.   Endocrine: Negative for cold intolerance, heat intolerance, polydipsia, polyphagia and polyuria.   Genitourinary:  Negative for difficulty urinating, dysuria, hematuria, penile pain and urgency.   Musculoskeletal:  Negative for arthralgias, joint swelling, myalgias and neck pain.   Integumentary:  Negative for color change, pallor, rash and wound.   Allergic/Immunologic: Negative for environmental allergies, food allergies and frequent infections.   Neurological:  Negative for dizziness, weakness, light-headedness, numbness and headaches.   Hematological:  Negative for adenopathy. Does not bruise/bleed easily.   Psychiatric/Behavioral:  Negative for agitation, behavioral problems, confusion, dysphoric mood, sleep disturbance and suicidal ideas.             Review of patient's allergies indicates:   Allergen Reactions    Benazepril hcl Other (See Comments)     Coughing         Current Outpatient Medications:     ascorbic acid, vitamin C, (VITAMIN C) 1000 MG tablet, Take 1,000 mg by mouth once daily., Disp: , Rfl:     betamethasone dipropionate  (DIPROLENE) 0.05 % cream, AAA ears and scalp bid prn, Disp: 60 g, Rfl: 3    folic acid (FOLVITE) 1 MG tablet, Take 1 mg by mouth once daily., Disp: , Rfl:     losartan (COZAAR) 100 MG tablet, TAKE 1 TABLET BY MOUTH EVERY DAY, Disp: 90 tablet, Rfl: 3    sildenafil (VIAGRA) 100 MG tablet, Take 1 tablet (100 mg total) by mouth daily as needed for Erectile Dysfunction., Disp: 30 tablet, Rfl: 2    Patient Active Problem List   Diagnosis    Other acquired hemolytic anemias    Splenomegaly    Hypertension, essential    Hereditary spherocytic hemolytic anemia    History of hepatitis C,s/p successful Rx w/ SVR24 (cure) - 6/2018    AK (actinic keratosis)    Hepatic steatosis    Congenital heart anomaly    Bicuspid aortic valve    Smoldering myeloma    Low grade B cell lymphoproliferative disorder    Colon polyps    Rising PSA level    Hyperlipidemia       Past Medical History:   Diagnosis Date    Allergy     Anemia of unknown etiology 12/18/2019    -followed in hematology    Asymptomatic cholelithiasis     Heart murmur     History of hepatitis C,s/p successful Rx w/ SVR24 (cure) - 6/2018 10/18/2016    GT2, tx naive  2014 liver biopsy, grade 1 stage 1 9-2017 Fibroscan = F2   Completed Epclusa 12wks w/ SVR    Hypertension     Tuberculosis     Hx postive       Past Surgical History:   Procedure Laterality Date    CARDIAC SURGERY  01/01/1962    COLONOSCOPY N/A 09/27/2022    Procedure: COLONOSCOPY;  Surgeon: Antonio Russo MD;  Location: [address]);  Service: Endoscopy;  Laterality: N/A;  Not vaccinated. will do home test and notify of results.EC    FRACTURE SURGERY  Jan 2009    Athrodesis    HAND SURGERY  01/01/2008    Left hand    HERNIA REPAIR  01/01/1967    orchiopexy, left  age 12       Social History     Socioeconomic History    Marital status:    Tobacco Use    Smoking status: Never    Smokeless tobacco: Never   Substance and Sexual Activity    Alcohol use: Yes     Alcohol/week: 24.0 standard drinks of  alcohol     Types: 24 Cans of beer per week    Drug use: No    Sexual activity: Not Currently     Partners: Female     Comment:  20 years     Social Determinants of Health     Financial Resource Strain: Low Risk  (2/28/2024)    Overall Financial Resource Strain (CARDIA)     Difficulty of Paying Living Expenses: Not hard at all   Food Insecurity: No Food Insecurity (2/28/2024)    Hunger Vital Sign     Worried About Running Out of Food in the Last Year: Never true     Ran Out of Food in the Last Year: Never true   Transportation Needs: No Transportation Needs (2/28/2024)    PRAPARE - Transportation     Lack of Transportation (Medical): No     Lack of Transportation (Non-Medical): No   Physical Activity: Sufficiently Active (2/28/2024)    Exercise Vital Sign     Days of Exercise per Week: 7 days     Minutes of Exercise per Session: 40 min   Stress: No Stress Concern Present (2/28/2024)    [employer] - Occupational Stress Questionnaire     Feeling of Stress : Not at all   Social Connections: Unknown (2/28/2024)    Social Connection and Isolation Panel [NHANES]     Frequency of Communication with Friends and Family: More than three times a week     Frequency of Social Gatherings with Friends and Family: Once a week     Active Member of Clubs or Organizations: No     Attends Club or Organization Meetings: Never     Marital Status:    Housing Stability: Low Risk  (2/28/2024)    Housing Stability Vital Sign     Unable to Pay for Housing in the Last Year: No     Number of Places Lived in the Last Year: 1     Unstable Housing in the Last Year: No       Family History   Problem Relation Age of Onset    Stroke Mother     Heart disease Father     Cancer Paternal Aunt     Cancer Paternal Uncle     Early death Paternal Cousin     Melanoma Neg Hx        Objective     Vitals:    03/01/24 1133 03/01/24 1201   BP: (!) 150/60 (!) 140/60   Pulse: 86    SpO2: 99%    Weight: 74.8 kg (164 lb 14.5  "oz)    Height: 5' 8" (1.727 m)    PainSc: 0-No pain        Body mass index is 25.07 kg/m².    Physical Exam  Vitals reviewed.   Constitutional:       Appearance: Normal appearance. He is well-developed.   HENT:      Head: Normocephalic.      Right Ear: Tympanic membrane, ear canal and external ear normal. There is no impacted cerumen.      Left Ear: Tympanic membrane, ear canal and external ear normal. There is no impacted cerumen.      Nose: Nose normal.      Mouth/Throat:      Mouth: Mucous membranes are moist.      Pharynx: Oropharynx is clear.   Eyes:      General: Lids are normal. Lids are everted, no foreign bodies appreciated.      Conjunctiva/sclera: Conjunctivae normal.      Pupils: Pupils are equal, round, and reactive to light.   Neck:      Vascular: No carotid bruit or JVD.      Trachea: Trachea normal.   Cardiovascular:      Rate and Rhythm: Normal rate and regular rhythm.      Pulses: Normal pulses.      Heart sounds: Normal heart sounds.   Pulmonary:      Effort: Pulmonary effort is normal.      Breath sounds: Normal breath sounds.   Abdominal:      General: Abdomen is flat. Bowel sounds are normal.      Palpations: Abdomen is soft.   Musculoskeletal:         General: Normal range of motion.      Cervical back: Full passive range of motion without pain, normal range of motion and neck supple.   Skin:     General: Skin is warm and dry.      Capillary Refill: Capillary refill takes less than 2 seconds.   Neurological:      General: No focal deficit present.      Mental Status: He is alert and oriented to person, place, and time.   Psychiatric:         Mood and Affect: Mood normal.         Speech: Speech normal.         Behavior: Behavior normal.         Thought Content: Thought content normal.         Judgment: Judgment normal.            Assessment and Plan     1. Hypertension, essential  -     Comprehensive Metabolic Panel; Future; Expected date: 03/01/2024  -     TSH; Future; Expected date: " 03/01/2024    2. Mixed hyperlipidemia  Overview:  -naive LDL <60    Orders:  -     Lipid Panel; Future; Expected date: 03/01/2024  -     Hemoglobin A1C; Future; Expected date: 03/01/2024    3. Bicuspid aortic valve  Overview:  -followed in cardiology      4. Low grade B cell lymphoproliferative disorder  Overview:  -followed in hematology    Orders:  -     CBC Auto Differential; Future; Expected date: 03/01/2024    5. Other acquired hemolytic anemias  Overview:  -followed in hematology    Orders:  -     CBC Auto Differential; Future; Expected date: 03/01/2024    6. Smoldering myeloma  Overview:  -followed in hematology    Orders:  -     CBC Auto Differential; Future; Expected date: 03/01/2024    7. Hereditary spherocytic hemolytic anemia  Overview:  see Dr. Sahni's 9/7/2013 phone notes    Orders:  -     CBC Auto Differential; Future; Expected date: 03/01/2024    8. Need for influenza vaccination  9. Need for RSV immunization  10. Need for pneumococcal vaccination  Declined all vaccines    11. Benign prostatic hyperplasia, unspecified whether lower urinary tract symptoms present  -     PSA, Screening; Future; Expected date: 03/01/2024    12. Abnormal finding of blood chemistry, unspecified  -     Hemoglobin A1C; Future; Expected date: 03/01/2024    13. Encounter for screening for malignant neoplasm of prostate  -     PSA, Screening; Future; Expected date: 03/01/2024    Check labs today, will message with results, if stable repeat in one year with annual    Declines vaccines    BP elevated today    Continue meds    Take medications as prescribed.    Monitor BP at home, goal BP < or = 140/80, call office if consistently above this range.    Follow low salt DASH diet and exercise.    BMI reviewed.    Go to ED if Headaches, blurred vision, chest pain, or SOB occurs along with elevated readings > or = 160/90.    Self care instructions provided in AVS           Follow up in about 1 year (around 3/1/2025) for annual or  sooner as needed with PCP Dr. Cordero.

## 2024-03-04 ENCOUNTER — PATIENT MESSAGE (OUTPATIENT)
Dept: UROLOGY | Facility: CLINIC | Age: 69
End: 2024-03-04
Payer: MEDICARE

## 2024-03-04 DIAGNOSIS — R97.20 RISING PSA LEVEL: Primary | ICD-10-CM

## 2024-04-05 ENCOUNTER — OFFICE VISIT (OUTPATIENT)
Dept: CARDIOLOGY | Facility: CLINIC | Age: 69
End: 2024-04-05
Payer: MEDICARE

## 2024-04-05 VITALS
OXYGEN SATURATION: 98 % | WEIGHT: 164.44 LBS | HEART RATE: 80 BPM | HEIGHT: 68 IN | SYSTOLIC BLOOD PRESSURE: 135 MMHG | DIASTOLIC BLOOD PRESSURE: 63 MMHG | BODY MASS INDEX: 24.92 KG/M2

## 2024-04-05 DIAGNOSIS — Q23.1 BICUSPID AORTIC VALVE: Primary | ICD-10-CM

## 2024-04-05 DIAGNOSIS — E78.2 MIXED HYPERLIPIDEMIA: ICD-10-CM

## 2024-04-05 DIAGNOSIS — I10 HYPERTENSION, ESSENTIAL: ICD-10-CM

## 2024-04-05 PROCEDURE — 99213 OFFICE O/P EST LOW 20 MIN: CPT | Mod: S$PBB,GC,, | Performed by: STUDENT IN AN ORGANIZED HEALTH CARE EDUCATION/TRAINING PROGRAM

## 2024-04-05 PROCEDURE — 99213 OFFICE O/P EST LOW 20 MIN: CPT | Mod: PBBFAC | Performed by: STUDENT IN AN ORGANIZED HEALTH CARE EDUCATION/TRAINING PROGRAM

## 2024-04-05 PROCEDURE — 99999 PR PBB SHADOW E&M-EST. PATIENT-LVL III: CPT | Mod: PBBFAC,GC,, | Performed by: STUDENT IN AN ORGANIZED HEALTH CARE EDUCATION/TRAINING PROGRAM

## 2024-04-05 NOTE — PROGRESS NOTES
PCP - Doug Cordero MD  Cardiologist: Dr. Paredes    Subjective:   Patient ID:  Jose Sahni is a 69 y.o. y.o. male who presents for his yearly evaluation and treatment of hypertension.     PMH  Bicuspid aortic valve/congenital cardiac anomaly:  He was born with congenital heart disease, which he had corrective surgery for at age 7, he does not know the name of the hospital where he had his surgery. There was apparently an attempt to obtain his records (from a few hospitals that he thought may have had his records) but we do not have them.   HTN  Hyperlipidemia  Hepatitis C s/p treatment (2016)  Smoldering myeloma, low grade B cell lymphoproliferative disorder- followed by Heme-Onc     Mr. Sahni was seen a little over a year ago by Dr. Paredes and has been doing very well. Denies any complaints such as chest pain, SOB, orthopnea, palpitations, PND, edema, or any other complaints. He has a dermatology appt scheduled for next week for skin scaling on both hands. He continues to enjoy his walks with his [place] land[place]. No medications have been changed or added since he was last seen. Routine lab work was also reviewed and WNL except from some chronic anemia.     History:     Social History     Tobacco Use    Smoking status: Never    Smokeless tobacco: Never   Substance Use Topics    Alcohol use: Yes     Alcohol/week: 24.0 standard drinks of alcohol     Types: 24 Cans of beer per week     Family History   Problem Relation Age of Onset    Stroke Mother     Heart disease Father     Cancer Paternal Aunt     Cancer Paternal Uncle     Early death Paternal Cousin     Melanoma Neg Hx        Meds:     Review of patient's allergies indicates:   Allergen Reactions    Benazepril hcl Other (See Comments)     Coughing       Current Outpatient Medications:     ascorbic acid, vitamin C, (VITAMIN C) 1000 MG tablet, Take 1,000 mg by mouth once daily., Disp: , Rfl:     betamethasone dipropionate (DIPROLENE) 0.05 % cream, AAA  "ears and scalp bid prn, Disp: 60 g, Rfl: 3    folic acid (FOLVITE) 1 MG tablet, Take 1 mg by mouth once daily., Disp: , Rfl:     losartan (COZAAR) 100 MG tablet, TAKE 1 TABLET BY MOUTH EVERY DAY, Disp: 90 tablet, Rfl: 3    sildenafil (VIAGRA) 100 MG tablet, Take 1 tablet (100 mg total) by mouth daily as needed for Erectile Dysfunction., Disp: 30 tablet, Rfl: 2    Review of Systems   All other systems reviewed and are negative.      Objective:   /63   Pulse 80   Ht 5' 8" (1.727 m)   Wt 74.6 kg (164 lb 7.4 oz)   SpO2 98%   BMI 25.01 kg/m²   Physical Exam  Vitals and nursing note reviewed.   Constitutional:       General: He is not in acute distress.     Appearance: Normal appearance. He is normal weight.   HENT:      Head: Normocephalic and atraumatic.      Nose: Nose normal. No congestion.      Mouth/Throat:      Mouth: Mucous membranes are moist.      Pharynx: Oropharynx is clear.   Eyes:      Extraocular Movements: Extraocular movements intact.      Conjunctiva/sclera: Conjunctivae normal.   Neck:      Vascular: No carotid bruit.   Cardiovascular:      Rate and Rhythm: Normal rate and regular rhythm.      Pulses:           Carotid pulses are 2+ on the right side and 2+ on the left side.       Radial pulses are 2+ on the right side and 2+ on the left side.        Femoral pulses are 2+ on the right side and 2+ on the left side.       Popliteal pulses are 2+ on the right side and 2+ on the left side.        Dorsalis pedis pulses are 2+ on the right side and 2+ on the left side.        Posterior tibial pulses are 2+ on the right side and 2+ on the left side.      Heart sounds: Murmur heard.      Crescendo decrescendo systolic murmur is present with a grade of 4/6.   Pulmonary:      Effort: Pulmonary effort is normal. No respiratory distress.      Breath sounds: Normal breath sounds.   Abdominal:      General: Abdomen is flat.      Palpations: Abdomen is soft.   Musculoskeletal:         General: No swelling " "or tenderness. Normal range of motion.      Cervical back: Normal range of motion.      Right lower leg: No edema.      Left lower leg: No edema.   Skin:     General: Skin is warm and dry.      Capillary Refill: Capillary refill takes less than 2 seconds.   Neurological:      General: No focal deficit present.      Mental Status: He is alert and oriented to person, place, and time.      Motor: No weakness.   Psychiatric:         Mood and Affect: Mood normal.         Behavior: Behavior normal.         Thought Content: Thought content normal.       Labs:     Lab Results   Component Value Date     03/01/2024    K 4.8 03/01/2024     03/01/2024    CO2 19 (L) 03/01/2024    BUN 22 03/01/2024    CREATININE 1.0 03/01/2024    ANIONGAP 10 03/01/2024     Lab Results   Component Value Date    HGBA1C 4.0 03/01/2024     No results found for: "BNP", "BNPTRIAGEBLO"    Lab Results   Component Value Date    WBC 5.80 03/01/2024    HGB 11.1 (L) 03/01/2024    HCT 32.0 (L) 03/01/2024     (L) 03/01/2024    GRAN 3.4 03/01/2024    GRAN 58.5 03/01/2024     Lab Results   Component Value Date    CHOL 107 (L) 03/01/2024    HDL 27 (L) 03/01/2024    LDLCALC 58.4 (L) 03/01/2024    TRIG 108 03/01/2024       Lab Results   Component Value Date     03/01/2024    K 4.8 03/01/2024     03/01/2024    CO2 19 (L) 03/01/2024    BUN 22 03/01/2024    CREATININE 1.0 03/01/2024    ANIONGAP 10 03/01/2024     Lab Results   Component Value Date    HGBA1C 4.0 03/01/2024     No results found for: "BNP", "BNPTRIAGEBLO" Lab Results   Component Value Date    WBC 5.80 03/01/2024    HGB 11.1 (L) 03/01/2024    HCT 32.0 (L) 03/01/2024     (L) 03/01/2024    GRAN 3.4 03/01/2024    GRAN 58.5 03/01/2024     Lab Results   Component Value Date    CHOL 107 (L) 03/01/2024    HDL 27 (L) 03/01/2024    LDLCALC 58.4 (L) 03/01/2024    TRIG 108 03/01/2024                Cardiovascular Imaging:     Echo:   EF   Date Value Ref Range Status   02/18/2022 " 68 % Final         Assessment & Plan:     1. Bicuspid aortic valve    2. Mixed hyperlipidemia    3. Hypertension, essential      Plan:     Biscuspid aortic valve: doing well, consider a repeat TTE on next visit.     HLD: LDL 58.4, well controlled. Continue current regimen.     HTN: well control, no medication changes needed.     Discussed with Dr. Gan. Patient can follow up with me in 12 months.     Signed:  Umesh Mon M.D.  Cardiovascular Fellow  [facility]

## 2024-04-08 ENCOUNTER — OFFICE VISIT (OUTPATIENT)
Dept: DERMATOLOGY | Facility: CLINIC | Age: 69
End: 2024-04-08
Payer: MEDICARE

## 2024-04-08 DIAGNOSIS — L40.8 SEBOPSORIASIS: ICD-10-CM

## 2024-04-08 DIAGNOSIS — D22.9 MULTIPLE BENIGN NEVI: ICD-10-CM

## 2024-04-08 DIAGNOSIS — D48.5 NEOPLASM OF UNCERTAIN BEHAVIOR OF SKIN: Primary | ICD-10-CM

## 2024-04-08 DIAGNOSIS — L81.4 LENTIGO: ICD-10-CM

## 2024-04-08 DIAGNOSIS — Z12.83 SCREENING EXAM FOR SKIN CANCER: ICD-10-CM

## 2024-04-08 DIAGNOSIS — L57.0 AK (ACTINIC KERATOSIS): ICD-10-CM

## 2024-04-08 PROCEDURE — 99999 PR PBB SHADOW E&M-EST. PATIENT-LVL III: CPT | Mod: PBBFAC,,, | Performed by: DERMATOLOGY

## 2024-04-08 PROCEDURE — 17004 DESTROY PREMAL LESIONS 15/>: CPT | Mod: 59,PBBFAC | Performed by: DERMATOLOGY

## 2024-04-08 PROCEDURE — 99204 OFFICE O/P NEW MOD 45 MIN: CPT | Mod: 25,S$PBB,, | Performed by: DERMATOLOGY

## 2024-04-08 PROCEDURE — 88305 TISSUE EXAM BY PATHOLOGIST: CPT | Performed by: DERMATOLOGY

## 2024-04-08 PROCEDURE — 17004 DESTROY PREMAL LESIONS 15/>: CPT | Mod: S$PBB,,, | Performed by: DERMATOLOGY

## 2024-04-08 PROCEDURE — 88305 TISSUE EXAM BY PATHOLOGIST: CPT | Mod: 26,,, | Performed by: DERMATOLOGY

## 2024-04-08 PROCEDURE — 11102 TANGNTL BX SKIN SINGLE LES: CPT | Mod: S$PBB,XS,, | Performed by: DERMATOLOGY

## 2024-04-08 PROCEDURE — 11102 TANGNTL BX SKIN SINGLE LES: CPT | Mod: PBBFAC,XS | Performed by: DERMATOLOGY

## 2024-04-08 PROCEDURE — 99213 OFFICE O/P EST LOW 20 MIN: CPT | Mod: PBBFAC,25 | Performed by: DERMATOLOGY

## 2024-04-08 RX ORDER — BETAMETHASONE DIPROPIONATE 0.5 MG/G
CREAM TOPICAL
Qty: 60 G | Refills: 3 | Status: SHIPPED | OUTPATIENT
Start: 2024-04-08

## 2024-04-08 RX ORDER — TRIAMCINOLONE ACETONIDE 0.25 MG/G
CREAM TOPICAL
Qty: 30 G | Refills: 3 | Status: SHIPPED | OUTPATIENT
Start: 2024-04-08

## 2024-04-08 NOTE — PROGRESS NOTES
Subjective:      Patient ID:  Jose Sahni is a 69 y.o. male who presents for   Chief Complaint   Patient presents with    Skin Check     UBSE     History of Present Illness: The patient presents for follow up of skin check.    The patient was last seen on: 6/30/2020 for alopecia areata - left post scalp - better.  No nmsc or mm.    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 Flares q 2 months. Controlled with diprolene cream prn approx q month.    New flare to buttock x 1 year - recurs in same spot q 2 months. Pt uses diprolene cream prn.    Other skin complaints:   Patient with new complaint of lesion(s)  Location: L cheek  Duration: 1 year  Symptoms: rough  Relieving factors/Previous treatments: none      Review of Systems   Skin:  Positive for activity-related sunscreen use and wears hat (sometimes). Negative for itching, rash (ears - currently clear), daily sunscreen use and recent sunburn.   Hematologic/Lymphatic: Bruises/bleeds easily.       Objective:   Physical Exam   Constitutional: He appears well-developed and well-nourished. No distress.   Neurological: He is alert and oriented to person, place, and time. He is not disoriented.   Psychiatric: He has a normal mood and affect.   Skin:   Areas Examined (abnormalities noted in diagram):   Scalp / Hair Palpated and Inspected  Head / Face Inspection Performed  Neck Inspection Performed  Chest / Axilla Inspection Performed  Back Inspection Performed  RUE Inspected  LUE Inspection Performed  Nails and Digits Inspection Performed                 Diagram Legend     Erythematous scaling macule/papule c/w actinic keratosis       Vascular papule c/w angioma      Pigmented verrucoid papule/plaque c/w seborrheic keratosis      Yellow umbilicated papule c/w sebaceous hyperplasia      Irregularly shaped tan macule c/w lentigo     1-2 mm smooth white papules consistent with Milia      Movable subcutaneous cyst with punctum c/w epidermal inclusion cyst      Subcutaneous movable  cyst c/w pilar cyst      Firm pink to brown papule c/w dermatofibroma      Pedunculated fleshy papule(s) c/w skin tag(s)      Evenly pigmented macule c/w junctional nevus     Mildly variegated pigmented, slightly irregular-bordered macule c/w mildly atypical nevus      Flesh colored to evenly pigmented papule c/w intradermal nevus       Pink pearly papule/plaque c/w basal cell carcinoma      Erythematous hyperkeratotic cursted plaque c/w SCC      Surgical scar with no sign of skin cancer recurrence      Open and closed comedones      Inflammatory papules and pustules      Verrucoid papule consistent consistent with wart     Erythematous eczematous patches and plaques     Dystrophic onycholytic nail with subungual debris c/w onychomycosis     Umbilicated papule    Erythematous-base heme-crusted tan verrucoid plaque consistent with inflamed seborrheic keratosis     Erythematous Silvery Scaling Plaque c/w Psoriasis     See annotation            Assessment / Plan:      Pathology Orders:       Normal Orders This Visit    Specimen to Pathology, Dermatology     Questions:    Procedure Type: Dermatology and skin neoplasms    Number of Specimens: 1    ------------------------: -------------------------    Spec 1 Procedure: Biopsy    Spec 1 Clinical Impression: r/o bcc    Spec 1 Source: right lateral eyebrow    Release to patient: Immediate    Release to patient:           Neoplasm of uncertain behavior of skin  -     Specimen to Pathology, Dermatology    Shave biopsy procedure note:    Shave biopsy performed after verbal consent including risk of infection, scar, recurrence, need for additional treatment of site. Area prepped with alcohol, anesthetized with approximately 1.0cc of 1% lidocaine with epinephrine. Lesional tissue shaved with razor blade. Hemostasis achieved with application of aluminum chloride followed by hyfrecation. No complications. Dressing applied. Wound care explained.    If biopsy positive for  malignancy, refer to Dr. Owens for Mohs surgery consultation.        AK (actinic keratosis)  Cryosurgery Procedure Note    Verbal consent from the patient is obtained including, but not limited to, risk of hypopigmentation/hyperpigmentation, scar, recurrence of lesion. The patient is aware of the precancerous quality and need for treatment of these lesions. Liquid nitrogen cryosurgery is applied to the 17 actinic keratoses, as detailed in the physical exam, to produce a freeze injury. The patient is aware that blisters may form and is instructed on wound care with gentle cleansing and use of vaseline ointment to keep moist until healed. The patient is supplied a handout on cryosurgery and is instructed to call if lesions do not completely resolve.    Wear hat always      Sebopsoriasis  -     triamcinolone acetonide 0.025% (KENALOG) 0.025 % cream; AAA gluteal cleft bid prn flare  Dispense: 30 g; Refill: 3  -     betamethasone dipropionate 0.05 % cream; AAA ears bid prn  Dispense: 60 g; Refill: 3    Multiple benign nevi   - minor problem and chronic.   Reassurance given to patient. No treatment necessary.       Lentigo   - minor problem and chronic.   Reassurance given to patient. No treatment necessary.   Discussed long sleeve shirts    Screening exam for skin cancer  Upper body skin examination performed today including at least 6 points as noted in physical examination. Suspicious lesions noted.    Recommend daily sun protection/avoidance and use of at least SPF 30, broad spectrum sunscreen (OTC drug).                Follow up in about 3 months (around 7/8/2024), or if symptoms worsen or fail to improve.

## 2024-04-08 NOTE — PATIENT INSTRUCTIONS
CRYOSURGERY      Your doctor has used a method called cryosurgery to treat your skin condition. Cryosurgery refers to the use of very cold substances to treat a variety of skin conditions such as warts, pre-skin cancers, molluscum contagiosum, sun spots, and several benign growths. The substance we use in cryosurgery is liquid nitrogen and is so cold (-195 degrees Celsius) that is burns when administered.     Following treatment in the office, the skin may immediately burn and become red. You may find the area around the lesion is affected as well. It is sometimes necessary to treat not only the lesion, but a small area of the surrounding normal skin to achieve a good response.     A blister, and even a blood filled blister, may form after treatment.   This is a normal response. If the blister is painful, it is acceptable to sterilize a needle and with rubbing alcohol and gently pop the blister. It is important that you gently wash the area with soap and warm water as the blister fluid may contain wart virus if a wart was treated. Do no remove the roof of the blister.     The area treated can take anywhere from 1-3 weeks to heal. Healing time depends on the kind skin lesion treated, the location, and how aggressively the lesion was treated. It is recommended that the areas treated are covered with Vaseline or bacitracin ointment and a band-aid. If a band-aid is not practical, just ointment applied several times per day will do. Keeping these areas moist will speed the healing time.    Treatment with liquid nitrogen can leave a scar. In dark skin, it may be a light or dark scar, in light skin it may be a white or pink scar. These will generally fade with time, but may never go away completely.     If you have any concerns after your treatment, please feel free to call the office.       [address] [place] [address]/ [phone] FAX/ [email or web address]      Shave Biopsy Wound Care    Your  doctor has performed a shave biopsy today.  A band aid and vaseline ointment has been placed over the site.  This should remain in place for NO LONGER THAN 48 hours.  It is fine to remove the bandaid after 24 hours, if the area is no longer bleeding. It is recommended that you keep the area dry (do not wet)) for the first 24 hours.  After 24 hours, wash the area with warm soap and water and apply Vaseline jelly.  Many patients prefer to use Neosporin or Bacitracin ointment.  This is acceptable; however, know that you can develop an allergy to this medication even if you have used it safely for years.  It is important to keep the area moist.  Letting it dry out and get air slows healing time, and will worsen the scar.        If you notice increasing redness, tenderness, pain, or yellow drainage at the biopsy site, please notify your doctor.  These are signs of an infection.    If your biopsy site is bleeding, apply firm pressure for 15 minutes straight.  Repeat for another 15 minutes, if it is still bleeding.   If the surgical site continues to bleed, then please contact your doctor.      For [facility] users:   You will receive your biopsy results in [facility] as soon as they are available. Please be assured that your physician/provider will review your results and will then determine what further treatment, evaluation, or planning is required. You should be contacted by your physician's/provider's office within 5 business days of receiving your results; If not, please reach out to directly. This is one more way [facility] is putting you first.     [facility] [place] [address]/ [phone] FAX/ [email or web address]

## 2024-04-10 LAB
FINAL PATHOLOGIC DIAGNOSIS: NORMAL
GROSS: NORMAL
Lab: NORMAL
MICROSCOPIC EXAM: NORMAL

## 2024-04-11 DIAGNOSIS — I10 HYPERTENSION, ESSENTIAL: Primary | ICD-10-CM

## 2024-04-11 NOTE — PROGRESS NOTES
1. Skin,  Right lateral eyebrow,  shave biopsy:   - BASAL CELL CARCINOMA, NODULAR TYPE   - The  tumor involves the deep tissue edge.  The peripheral tissue edge is uninvolved by tumor.        Recommend patient to Dr. Owens for Mohs surgery consultation. Picture in chart.   Items required for patient referral to Geo Owens MD L.  Copy of Pathology Report  2.  Demographic Sheet with patient phone number and insurance information  3.  Preferably a COLOR photo of the area of the biopsy and any progress notes from the biopsy visit  4.  A point of contact at the [facility] facility referring the patient that understands the referral and could assist if 1-3 above is not received.    All of this information can be faxed in a standard fashion to [phone].    Geo Owens M.D. [address] [address] [phone] fax      F/u with me in 6 months.

## 2024-04-12 ENCOUNTER — LAB VISIT (OUTPATIENT)
Dept: LAB | Facility: HOSPITAL | Age: 69
End: 2024-04-12
Payer: MEDICARE

## 2024-04-12 DIAGNOSIS — I10 HYPERTENSION, ESSENTIAL: ICD-10-CM

## 2024-04-12 LAB
ALBUMIN SERPL BCP-MCNC: 4.5 G/DL (ref 3.5–5.2)
ALP SERPL-CCNC: 61 U/L (ref 55–135)
ALT SERPL W/O P-5'-P-CCNC: 13 U/L (ref 10–44)
ANION GAP SERPL CALC-SCNC: 5 MMOL/L (ref 8–16)
AST SERPL-CCNC: 16 U/L (ref 10–40)
BASOPHILS # BLD AUTO: 0.04 K/UL (ref 0–0.2)
BASOPHILS NFR BLD: 0.7 % (ref 0–1.9)
BILIRUB SERPL-MCNC: 2 MG/DL (ref 0.1–1)
BUN SERPL-MCNC: 19 MG/DL (ref 8–23)
CALCIUM SERPL-MCNC: 9.6 MG/DL (ref 8.7–10.5)
CHLORIDE SERPL-SCNC: 112 MMOL/L (ref 95–110)
CO2 SERPL-SCNC: 22 MMOL/L (ref 23–29)
CREAT SERPL-MCNC: 1.1 MG/DL (ref 0.5–1.4)
DIFFERENTIAL METHOD BLD: ABNORMAL
EOSINOPHIL # BLD AUTO: 0.1 K/UL (ref 0–0.5)
EOSINOPHIL NFR BLD: 1.9 % (ref 0–8)
ERYTHROCYTE [DISTWIDTH] IN BLOOD BY AUTOMATED COUNT: 14.1 % (ref 11.5–14.5)
EST. GFR  (NO RACE VARIABLE): >60 ML/MIN/1.73 M^2
GLUCOSE SERPL-MCNC: 98 MG/DL (ref 70–110)
HCT VFR BLD AUTO: 35.8 % (ref 40–54)
HGB BLD-MCNC: 12.5 G/DL (ref 14–18)
IMM GRANULOCYTES # BLD AUTO: 0.03 K/UL (ref 0–0.04)
IMM GRANULOCYTES NFR BLD AUTO: 0.5 % (ref 0–0.5)
LDH SERPL L TO P-CCNC: 116 U/L (ref 110–260)
LYMPHOCYTES # BLD AUTO: 1.6 K/UL (ref 1–4.8)
LYMPHOCYTES NFR BLD: 26.3 % (ref 18–48)
MCH RBC QN AUTO: 32.4 PG (ref 27–31)
MCHC RBC AUTO-ENTMCNC: 34.9 G/DL (ref 32–36)
MCV RBC AUTO: 93 FL (ref 82–98)
MONOCYTES # BLD AUTO: 0.4 K/UL (ref 0.3–1)
MONOCYTES NFR BLD: 7.2 % (ref 4–15)
NEUTROPHILS # BLD AUTO: 3.8 K/UL (ref 1.8–7.7)
NEUTROPHILS NFR BLD: 63.4 % (ref 38–73)
NRBC BLD-RTO: 0 /100 WBC
PLATELET # BLD AUTO: 157 K/UL (ref 150–450)
PMV BLD AUTO: 10.1 FL (ref 9.2–12.9)
POTASSIUM SERPL-SCNC: 4.8 MMOL/L (ref 3.5–5.1)
PROT SERPL-MCNC: 8 G/DL (ref 6–8.4)
RBC # BLD AUTO: 3.86 M/UL (ref 4.6–6.2)
RETICS/RBC NFR AUTO: 4.5 % (ref 0.4–2)
SODIUM SERPL-SCNC: 139 MMOL/L (ref 136–145)
WBC # BLD AUTO: 5.94 K/UL (ref 3.9–12.7)

## 2024-04-12 PROCEDURE — 83615 LACTATE (LD) (LDH) ENZYME: CPT | Performed by: INTERNAL MEDICINE

## 2024-04-12 PROCEDURE — 84165 PROTEIN E-PHORESIS SERUM: CPT | Mod: 26,,, | Performed by: PATHOLOGY

## 2024-04-12 PROCEDURE — 36415 COLL VENOUS BLD VENIPUNCTURE: CPT | Performed by: INTERNAL MEDICINE

## 2024-04-12 PROCEDURE — 84165 PROTEIN E-PHORESIS SERUM: CPT | Performed by: INTERNAL MEDICINE

## 2024-04-12 PROCEDURE — 85025 COMPLETE CBC W/AUTO DIFF WBC: CPT | Performed by: INTERNAL MEDICINE

## 2024-04-12 PROCEDURE — 85045 AUTOMATED RETICULOCYTE COUNT: CPT | Performed by: INTERNAL MEDICINE

## 2024-04-12 PROCEDURE — 83521 IG LIGHT CHAINS FREE EACH: CPT | Mod: 59 | Performed by: INTERNAL MEDICINE

## 2024-04-12 PROCEDURE — 80053 COMPREHEN METABOLIC PANEL: CPT | Performed by: INTERNAL MEDICINE

## 2024-04-15 LAB
ALBUMIN SERPL ELPH-MCNC: 4.77 G/DL (ref 3.35–5.55)
ALPHA1 GLOB SERPL ELPH-MCNC: 0.29 G/DL (ref 0.17–0.41)
ALPHA2 GLOB SERPL ELPH-MCNC: 0.58 G/DL (ref 0.43–0.99)
B-GLOBULIN SERPL ELPH-MCNC: 0.55 G/DL (ref 0.5–1.1)
GAMMA GLOB SERPL ELPH-MCNC: 1.71 G/DL (ref 0.67–1.58)
KAPPA LC SER QL IA: 3.75 MG/DL (ref 0.33–1.94)
KAPPA LC/LAMBDA SER IA: 3.75 (ref 0.26–1.65)
LAMBDA LC SER QL IA: 1 MG/DL (ref 0.57–2.63)
PROT SERPL-MCNC: 7.9 G/DL (ref 6–8.4)

## 2024-04-16 LAB — PATHOLOGIST INTERPRETATION SPE: NORMAL

## 2024-06-10 ENCOUNTER — PATIENT MESSAGE (OUTPATIENT)
Dept: INTERNAL MEDICINE | Facility: CLINIC | Age: 69
End: 2024-06-10
Payer: MEDICARE

## 2024-07-02 ENCOUNTER — OFFICE VISIT (OUTPATIENT)
Dept: DERMATOLOGY | Facility: CLINIC | Age: 69
End: 2024-07-02
Payer: MEDICARE

## 2024-07-02 DIAGNOSIS — L81.4 LENTIGO: ICD-10-CM

## 2024-07-02 DIAGNOSIS — R23.8 VENOUS LAKE: ICD-10-CM

## 2024-07-02 DIAGNOSIS — L82.1 SK (SEBORRHEIC KERATOSIS): ICD-10-CM

## 2024-07-02 DIAGNOSIS — D22.9 MULTIPLE BENIGN NEVI: ICD-10-CM

## 2024-07-02 DIAGNOSIS — Z85.828 HISTORY OF NONMELANOMA SKIN CANCER: ICD-10-CM

## 2024-07-02 DIAGNOSIS — L57.0 AK (ACTINIC KERATOSIS): Primary | ICD-10-CM

## 2024-07-02 PROCEDURE — 17003 DESTRUCT PREMALG LES 2-14: CPT | Mod: PBBFAC | Performed by: DERMATOLOGY

## 2024-07-02 PROCEDURE — 17000 DESTRUCT PREMALG LESION: CPT | Mod: S$PBB,,, | Performed by: DERMATOLOGY

## 2024-07-02 PROCEDURE — 99213 OFFICE O/P EST LOW 20 MIN: CPT | Mod: 25,S$PBB,, | Performed by: DERMATOLOGY

## 2024-07-02 PROCEDURE — 99212 OFFICE O/P EST SF 10 MIN: CPT | Mod: PBBFAC | Performed by: DERMATOLOGY

## 2024-07-02 PROCEDURE — 99999 PR PBB SHADOW E&M-EST. PATIENT-LVL II: CPT | Mod: PBBFAC,,, | Performed by: DERMATOLOGY

## 2024-07-02 PROCEDURE — 17000 DESTRUCT PREMALG LESION: CPT | Mod: PBBFAC | Performed by: DERMATOLOGY

## 2024-07-02 PROCEDURE — 17003 DESTRUCT PREMALG LES 2-14: CPT | Mod: S$PBB,,, | Performed by: DERMATOLOGY

## 2024-07-02 NOTE — PATIENT INSTRUCTIONS

## 2024-07-02 NOTE — PROGRESS NOTES
Subjective:      Patient ID:  Jose Sahni is a 69 y.o. male who presents for   Chief Complaint   Patient presents with    Skin Check     Ubse     History of Present Illness: The patient presents for follow up of skin check.    The patient was last seen on: 4/8/2024 for cryosurgery to actinic keratoses which have resolved and bx R lateral eyebrow-BCC excised by Dr. Owens.  This is a high risk patient here to check for the development of new lesions.    Other skin complaints: None    H/o sebopsoriasis on ears and buttock- stable. Controlled with TAC 0.025% cream and diprolene cream prn flares.                 Review of Systems   Skin:  Positive for activity-related sunscreen use (Wears UV shirts) and wears hat (80%). Negative for itching, rash (ears and buttock - currently clear), daily sunscreen use and recent sunburn.   Hematologic/Lymphatic: Bruises/bleeds easily.       Objective:   Physical Exam   Constitutional: He appears well-developed and well-nourished. No distress.   Neurological: He is alert and oriented to person, place, and time. He is not disoriented.   Psychiatric: He has a normal mood and affect.   Skin:   Areas Examined (abnormalities noted in diagram):   Scalp / Hair Palpated and Inspected  Head / Face Inspection Performed  Neck Inspection Performed  Chest / Axilla Inspection Performed  Back Inspection Performed  RUE Inspected  LUE Inspection Performed                 Diagram Legend     Erythematous scaling macule/papule c/w actinic keratosis       Vascular papule c/w angioma      Pigmented verrucoid papule/plaque c/w seborrheic keratosis      Yellow umbilicated papule c/w sebaceous hyperplasia      Irregularly shaped tan macule c/w lentigo     1-2 mm smooth white papules consistent with Milia      Movable subcutaneous cyst with punctum c/w epidermal inclusion cyst      Subcutaneous movable cyst c/w pilar cyst      Firm pink to brown papule c/w dermatofibroma      Pedunculated fleshy papule(s)  c/w skin tag(s)      Evenly pigmented macule c/w junctional nevus     Mildly variegated pigmented, slightly irregular-bordered macule c/w mildly atypical nevus      Flesh colored to evenly pigmented papule c/w intradermal nevus       Pink pearly papule/plaque c/w basal cell carcinoma      Erythematous hyperkeratotic cursted plaque c/w SCC      Surgical scar with no sign of skin cancer recurrence      Open and closed comedones      Inflammatory papules and pustules      Verrucoid papule consistent consistent with wart     Erythematous eczematous patches and plaques     Dystrophic onycholytic nail with subungual debris c/w onychomycosis     Umbilicated papule    Erythematous-base heme-crusted tan verrucoid plaque consistent with inflamed seborrheic keratosis     Erythematous Silvery Scaling Plaque c/w Psoriasis     See annotation      Assessment / Plan:        AK (actinic keratosis)  Cryosurgery Procedure Note    Verbal consent from the patient is obtained including, but not limited to, risk of hypopigmentation/hyperpigmentation, scar, recurrence of lesion. The patient is aware of the precancerous quality and need for treatment of these lesions. Liquid nitrogen cryosurgery is applied to the 3 actinic keratoses, as detailed in the physical exam, to produce a freeze injury. The patient is aware that blisters may form and is instructed on wound care with gentle cleansing and use of vaseline ointment to keep moist until healed. The patient is supplied a handout on cryosurgery and is instructed to call if lesions do not completely resolve.    Wear hat always    Lentigo   - minor problem and chronic.   Reassurance given to patient. No treatment necessary.     Multiple benign nevi   - minor problem and chronic.   Reassurance given to patient. No treatment necessary.     SK (seborrheic keratosis)  These are benign inherited growths without a malignant potential. Reassurance given to patient. No treatment is necessary.      Venous lake   - minor problem and chronic.   Reassurance given to patient. No treatment necessary.     History of nonmelanoma skin cancer  Area(s) of previous NMSC evaluated with no signs of recurrence.    Upper body skin examination performed today including at least 6 points as noted in physical examination. No lesions suspicious for malignancy noted.    Recommend daily sun protection/avoidance and use of at least SPF 30, broad spectrum sunscreen (OTC drug).              Follow up in about 1 year (around 7/2/2025) for UBSE.

## 2024-08-05 ENCOUNTER — LAB VISIT (OUTPATIENT)
Dept: LAB | Facility: HOSPITAL | Age: 69
End: 2024-08-05
Payer: MEDICARE

## 2024-08-05 DIAGNOSIS — R97.20 RISING PSA LEVEL: ICD-10-CM

## 2024-08-05 LAB — COMPLEXED PSA SERPL-MCNC: 3 NG/ML (ref 0–4)

## 2024-08-05 PROCEDURE — 84153 ASSAY OF PSA TOTAL: CPT

## 2024-08-05 PROCEDURE — 36415 COLL VENOUS BLD VENIPUNCTURE: CPT

## 2024-08-15 ENCOUNTER — OFFICE VISIT (OUTPATIENT)
Dept: INTERNAL MEDICINE | Facility: CLINIC | Age: 69
End: 2024-08-15
Attending: FAMILY MEDICINE
Payer: MEDICARE

## 2024-08-15 VITALS — SYSTOLIC BLOOD PRESSURE: 122 MMHG | DIASTOLIC BLOOD PRESSURE: 66 MMHG

## 2024-08-15 DIAGNOSIS — Q23.1 BICUSPID AORTIC VALVE: ICD-10-CM

## 2024-08-15 DIAGNOSIS — D58.0 HEREDITARY SPHEROCYTIC HEMOLYTIC ANEMIA: Chronic | ICD-10-CM

## 2024-08-15 DIAGNOSIS — Q24.9 CONGENITAL HEART ANOMALY: ICD-10-CM

## 2024-08-15 DIAGNOSIS — R97.20 RISING PSA LEVEL: ICD-10-CM

## 2024-08-15 DIAGNOSIS — I25.84 CORONARY ARTERY CALCIFICATION: ICD-10-CM

## 2024-08-15 DIAGNOSIS — D47.Z9 LOW GRADE B CELL LYMPHOPROLIFERATIVE DISORDER: ICD-10-CM

## 2024-08-15 DIAGNOSIS — I10 HYPERTENSION, ESSENTIAL: Primary | ICD-10-CM

## 2024-08-15 DIAGNOSIS — D47.2 SMOLDERING MYELOMA: ICD-10-CM

## 2024-08-15 DIAGNOSIS — E78.5 HYPERLIPIDEMIA, UNSPECIFIED HYPERLIPIDEMIA TYPE: ICD-10-CM

## 2024-08-15 DIAGNOSIS — I25.10 CORONARY ARTERY CALCIFICATION: ICD-10-CM

## 2024-08-15 PROCEDURE — 99212 OFFICE O/P EST SF 10 MIN: CPT | Mod: PBBFAC | Performed by: FAMILY MEDICINE

## 2024-08-15 PROCEDURE — 99999 PR PBB SHADOW E&M-EST. PATIENT-LVL II: CPT | Mod: PBBFAC,,, | Performed by: FAMILY MEDICINE

## 2024-08-15 RX ORDER — PRAVASTATIN SODIUM 20 MG/1
20 TABLET ORAL DAILY
Qty: 90 TABLET | Refills: 3 | Status: SHIPPED | OUTPATIENT
Start: 2024-08-15

## 2024-08-15 RX ORDER — LOSARTAN POTASSIUM 100 MG/1
100 TABLET ORAL DAILY
Qty: 90 TABLET | Refills: 3 | Status: SHIPPED | OUTPATIENT
Start: 2024-08-15

## 2024-08-15 NOTE — PROGRESS NOTES
Subjective:       Patient ID: Jose Sahni is a 69 y.o. male.    Chief Complaint: No chief complaint on file.    Established patient follows up for management of chronic medical illnesses with complaints today. Please see dictation and ROS for interval problems, specific complaints and disease management discussion.    Past, Surgical, Family, Social, Histories; Medications, allergies reviewed and reconciled.  Health maintenance file reviewed and addressed items due. Recent applicable lab, imaging and cardiovascular results reviewed.  Problem list items reviewed and modified or added entries (in the overview section) may not be transcribed into this encounter note due to note writer format.      Was to have a AWV today, APRN is out.  Will see him for a follow-up today, has been quite sometime since seen last  Went over home BP readings, 130's - 150/60's - 70's.  He had a nice comprehensive list.  No particular new complaints at this time.    No sx of CVS. Followed in cardiology for congenital ?? Bicuspid AV.    Cardiology and hemonc UTD. PSA last week.        Review of Systems   Constitutional:  Negative for appetite change, chills, diaphoresis, fatigue and fever.   HENT:  Negative for congestion, postnasal drip, rhinorrhea, sore throat and trouble swallowing.    Eyes:  Negative for visual disturbance.   Respiratory:  Negative for cough, choking, chest tightness, shortness of breath and wheezing.    Cardiovascular:  Negative for chest pain and leg swelling.   Gastrointestinal:  Negative for abdominal distention, abdominal pain, diarrhea, nausea and vomiting.   Genitourinary:  Negative for difficulty urinating and hematuria.   Musculoskeletal:  Negative for arthralgias and myalgias.   Skin:  Negative for rash.   Neurological:  Negative for weakness, light-headedness and headaches.   Psychiatric/Behavioral:  Negative for confusion and dysphoric mood.        Objective:      Physical Exam  Vitals and nursing note  reviewed.   Constitutional:       General: He is not in acute distress.     Appearance: He is well-developed. He is not diaphoretic.   HENT:      Head: Normocephalic and atraumatic.   Eyes:      General: No scleral icterus.     Conjunctiva/sclera: Conjunctivae normal.   Cardiovascular:      Rate and Rhythm: Normal rate and regular rhythm.      Heart sounds: Murmur heard.      No friction rub. No gallop.   Pulmonary:      Effort: Pulmonary effort is normal. No respiratory distress.      Breath sounds: Normal breath sounds. No wheezing or rales.   Abdominal:      General: There is no distension.      Tenderness: There is no abdominal tenderness.   Musculoskeletal:         General: No deformity.      Cervical back: Normal range of motion and neck supple.      Right lower leg: No edema.      Left lower leg: No edema.   Skin:     General: Skin is warm and dry.      Findings: No erythema or rash.   Neurological:      Mental Status: He is alert and oriented to person, place, and time.      Cranial Nerves: No cranial nerve deficit.      Motor: No tremor.      Coordination: Coordination normal.      Gait: Gait normal.   Psychiatric:         Behavior: Behavior normal.         Thought Content: Thought content normal.         Judgment: Judgment normal.         Assessment:       1. Hypertension, essential    2. Rising PSA level    3. 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 Hereditary spherocytic hemolytic anemia    6. Smoldering myeloma    7. Hyperlipidemia, unspecified hyperlipidemia type    8. Coronary artery calcification    9. Bicuspid aortic valve        Plan:     Medication List with Changes/Refills   New Medications    PRAVASTATIN (PRAVACHOL) 20 MG TABLET    Take 1 tablet (20 mg total) by mouth once daily.   Current Medications    ASCORBIC ACID, VITAMIN C, (VITAMIN C) 1000 MG TABLET    Take 1,000 mg by mouth once daily.    BETAMETHASONE DIPROPIONATE 0.05 % CREAM    AAA ears bid  prn    FOLIC ACID (FOLVITE) 1 MG TABLET    Take 1 mg by mouth once daily.    SILDENAFIL (VIAGRA) 100 MG TABLET    Take 1 tablet (100 mg total) by mouth daily as needed for Erectile Dysfunction.    TRIAMCINOLONE ACETONIDE 0.025% (KENALOG) 0.025 % CREAM    AAA gluteal cleft bid prn flare   Changed and/or Refilled Medications    Modified Medication Previous Medication    LOSARTAN (COZAAR) 100 MG TABLET losartan (COZAAR) 100 MG tablet       Take 1 tablet (100 mg total) by mouth once daily.    TAKE 1 TABLET BY MOUTH EVERY DAY     1. Hypertension, essential  -     Hypertension Digital Medicine (HDMP) Enrollment Order  -     losartan (COZAAR) 100 MG tablet; Take 1 tablet (100 mg total) by mouth once daily.  Dispense: 90 tablet; Refill: 3    2. Rising PSA level  Assessment & Plan:  -followed in urology, aug psa stable      3. Congenital heart anomaly  Overview:  -surgery 1963  -followed in cardiology      4. Low grade B cell lymphoproliferative disorder  Overview:  -followed in hematology    Assessment & Plan:  -10/12/2023 sumary      5. Hereditary spherocytic hemolytic anemia  Overview:  -see Dr. Sahni's 9/7/2013 phone notes  -followed in hematology      6. Smoldering myeloma  Overview:  -followed in hematology    Assessment & Plan:  -10/12/2023 sumary         7. Hyperlipidemia, unspecified hyperlipidemia type  Overview:  -naive LDL <60    Assessment & Plan:  -cor calc noted on 2020 PET CT (hem onc)    Orders:  -     pravastatin (PRAVACHOL) 20 MG tablet; Take 1 tablet (20 mg total) by mouth once daily.  Dispense: 90 tablet; Refill: 3    8. Coronary artery calcification  Overview:  -or calc noted on 2020 PET CT (hem onc)    Assessment & Plan:  -I recommend a low dose statin in light of low naive LDL, d/w cardiology @ next visit    Orders:  -     pravastatin (PRAVACHOL) 20 MG tablet; Take 1 tablet (20 mg total) by mouth once daily.  Dispense: 90 tablet; Refill: 3    9. Bicuspid aortic valve  Overview:  -1/2018 echo  suggests  -followed in cardiology        See meds, orders, follow up, routing and instructions sections of encounter and AVS. Discussed with patient and provided on AVS.    Discussed diet and exercise as therapeutic modalities for metabolic and other conditions. Provided patient information, which are included as links on the AVS for detailed information.    Lab Results   Component Value Date     04/12/2024    K 4.8 04/12/2024     (H) 04/12/2024    BUN 19 04/12/2024    CREATININE 1.1 04/12/2024    GLU 98 04/12/2024    HGBA1C 4.0 03/01/2024    AST 16 04/12/2024    ALT 13 04/12/2024    ALBUMIN 4.5 04/12/2024    PROT 8.0 04/12/2024    BILITOT 2.0 (H) 04/12/2024    CHOL 107 (L) 03/01/2024    HDL 27 (L) 03/01/2024    LDLCALC 58.4 (L) 03/01/2024    TRIG 108 03/01/2024    WBC 5.94 04/12/2024    HGB 12.5 (L) 04/12/2024    HCT 35.8 (L) 04/12/2024     04/12/2024    PSA 2.5 02/03/2022    PSADIAG 3.0 08/05/2024    TSH 2.150 03/01/2024

## 2024-08-21 ENCOUNTER — OFFICE VISIT (OUTPATIENT)
Dept: INTERNAL MEDICINE | Facility: CLINIC | Age: 69
End: 2024-08-21
Payer: MEDICARE

## 2024-08-21 VITALS
SYSTOLIC BLOOD PRESSURE: 128 MMHG | BODY MASS INDEX: 25.09 KG/M2 | OXYGEN SATURATION: 98 % | HEART RATE: 88 BPM | DIASTOLIC BLOOD PRESSURE: 58 MMHG | WEIGHT: 165.56 LBS | HEIGHT: 68 IN

## 2024-08-21 DIAGNOSIS — Q24.9 CONGENITAL HEART ANOMALY: ICD-10-CM

## 2024-08-21 DIAGNOSIS — Z86.19 HISTORY OF HEPATITIS C: ICD-10-CM

## 2024-08-21 DIAGNOSIS — I10 HYPERTENSION, ESSENTIAL: ICD-10-CM

## 2024-08-21 DIAGNOSIS — K63.5 POLYP OF COLON, UNSPECIFIED PART OF COLON, UNSPECIFIED TYPE: ICD-10-CM

## 2024-08-21 DIAGNOSIS — I25.10 CORONARY ARTERY CALCIFICATION: ICD-10-CM

## 2024-08-21 DIAGNOSIS — D59.8 OTHER ACQUIRED HEMOLYTIC ANEMIAS: ICD-10-CM

## 2024-08-21 DIAGNOSIS — R16.1 SPLENOMEGALY: ICD-10-CM

## 2024-08-21 DIAGNOSIS — E78.5 HYPERLIPIDEMIA, UNSPECIFIED HYPERLIPIDEMIA TYPE: ICD-10-CM

## 2024-08-21 DIAGNOSIS — I25.84 CORONARY ARTERY CALCIFICATION: ICD-10-CM

## 2024-08-21 DIAGNOSIS — D47.Z9 LOW GRADE B CELL LYMPHOPROLIFERATIVE DISORDER: ICD-10-CM

## 2024-08-21 DIAGNOSIS — L57.0 AK (ACTINIC KERATOSIS): Primary | ICD-10-CM

## 2024-08-21 DIAGNOSIS — D58.0 HEREDITARY SPHEROCYTIC HEMOLYTIC ANEMIA: Chronic | ICD-10-CM

## 2024-08-21 DIAGNOSIS — Q23.1 BICUSPID AORTIC VALVE: ICD-10-CM

## 2024-08-21 DIAGNOSIS — Z00.00 ENCOUNTER FOR PREVENTIVE HEALTH EXAMINATION: ICD-10-CM

## 2024-08-21 DIAGNOSIS — K76.0 HEPATIC STEATOSIS: ICD-10-CM

## 2024-08-21 DIAGNOSIS — Z00.00 ENCOUNTER FOR MEDICARE ANNUAL WELLNESS EXAM: ICD-10-CM

## 2024-08-21 DIAGNOSIS — D47.2 SMOLDERING MYELOMA: ICD-10-CM

## 2024-08-21 PROCEDURE — 99999 PR PBB SHADOW E&M-EST. PATIENT-LVL III: CPT | Mod: PBBFAC,,, | Performed by: PHYSICIAN ASSISTANT

## 2024-08-21 PROCEDURE — 99213 OFFICE O/P EST LOW 20 MIN: CPT | Mod: PBBFAC | Performed by: PHYSICIAN ASSISTANT

## 2024-08-21 PROCEDURE — G0439 PPPS, SUBSEQ VISIT: HCPCS | Mod: ,,, | Performed by: PHYSICIAN ASSISTANT

## 2024-08-21 NOTE — PROGRESS NOTES
"  Jose Sahni presented for a follow-up Medicare AWV today. The following components were reviewed and updated:    Medical history  Family History  Social history  Allergies and Current Medications  Health Risk Assessment  Health Maintenance  Care Team    **See Completed Assessments for Annual Wellness visit with in the encounter summary    The following assessments were completed:  Depression Screening  Cognitive function Screening    Timed Get Up Test  Whisper Test      Opioid documentation:      Patient does not have a current opioid prescription.          Vitals:    08/21/24 1313   BP: (!) 128/58   BP Location: Left arm   Patient Position: Sitting   BP Method: Large (Manual)   Pulse: 88   SpO2: 98%   Weight: 75.1 kg (165 lb 9.1 oz)   Height: 5' 8" (1.727 m)     Body mass index is 25.17 kg/m².       Physical Exam  Vitals and nursing note reviewed.   Constitutional:       General: He is not in acute distress.     Appearance: Normal appearance. He is not ill-appearing, toxic-appearing or diaphoretic.      Comments: Elderly male in NAD or apparent pain. He makes good eye contact, speaks in clear full sentences and ambulates with ease.    HENT:      Head: Normocephalic and atraumatic.      Nose: Nose normal.      Mouth/Throat:      Mouth: Mucous membranes are moist.   Eyes:      Extraocular Movements: Extraocular movements intact.      Conjunctiva/sclera: Conjunctivae normal.      Pupils: Pupils are equal, round, and reactive to light.   Cardiovascular:      Rate and Rhythm: Normal rate and regular rhythm.      Pulses: Normal pulses.      Heart sounds: Normal heart sounds.   Pulmonary:      Effort: Pulmonary effort is normal. No respiratory distress.      Breath sounds: Normal breath sounds.   Abdominal:      General: Bowel sounds are normal.   Musculoskeletal:         General: Normal range of motion.      Cervical back: Normal range of motion.   Skin:     General: Skin is warm and dry.      Capillary Refill: " Capillary refill takes less than 2 seconds.   Neurological:      General: No focal deficit present.      Mental Status: He is alert.   Psychiatric:         Mood and Affect: Mood normal.       Diagnoses and health risks identified today and associated recommendations/orders:  1. Encounter for Medicare annual wellness exam  - Ambulatory Referral/Consult to Enhanced Annual Wellness Visit (eAWV)  - annualy     2. AK (actinic keratosis)  Stable. Continue to follow with PCP.     3. Bicuspid aortic valve  Stable. Continue to follow with PCP.     4. Congenital heart anomaly  Stable. Continue to follow with PCP.     5. Coronary artery calcification  Stable. Continue to follow with PCP.     6. Hyperlipidemia, unspecified hyperlipidemia type  Stable. Continue statin. Continue to follow with PCP.     7. Hypertension, essential  Stable and well controlled. Continue to follow with PCP.     8. Hereditary spherocytic hemolytic anemia  Stable. Continue to follow with PCP.     9. Low grade B cell lymphoproliferative disorder  Stable. Continue to follow with PCP.     10. Other acquired hemolytic anemias  Stable. Continue to follow with PCP.     11. Smoldering myeloma  Stable. Continue to follow with PCP.     12. Polyp of colon, unspecified part of colon, unspecified type  Stable. Continue to follow with PCP.     13. Hepatic steatosis  Stable. Continue to follow with PCP.     14. History of hepatitis C,s/p successful Rx w/ SVR24 (cure) - 6/2018  Stable. Continue to follow with PCP.     15. Splenomegaly  Stable. Continue to follow with PCP.       Provided Jose with a 5-10 year written screening schedule and personal prevention plan. Recommendations were developed using the USPSTF age appropriate recommendations. Education, counseling, and referrals were provided as needed.  After Visit Summary printed and given to patient which includes a list of additional screenings\tests needed.    No follow-ups on file.      Wendy Simental,  VANESSA  I offered to discuss advanced care planning, including how to pick a person who would make decisions for you if you were unable to make them for yourself, called a health care power of , and what kind of decisions you might make such as use of life sustaining treatments such as ventilators and tube feeding when faced with a life limiting illness recorded on a living will that they will need to know. (How you want to be cared for as you near the end of your natural life)     X Patient is interested in learning more about how to make advanced directives.  I provided them paperwork and offered to discuss this with them.

## 2024-08-21 NOTE — PATIENT INSTRUCTIONS
Counseling and Referral of Other Preventative  (Italic type indicates deductible and co-insurance are waived)    Patient Name: Jose Sahni  Today's Date: 8/21/2024    Health Maintenance       Date Due Completion Date    RSV Vaccine (Age 60+ and Pregnant patients) (1 - 1-dose 60+ series) Never done ---    COVID-19 Vaccine (1) 03/01/2025 (Originally 2/3/1960) ---    Pneumococcal Vaccines (Age 65+) (2 of 2 - PPSV23 or PCV20) 03/01/2025 (Originally 3/31/2022) 2/3/2022    Influenza Vaccine (1) 09/01/2024 1/25/2018    Colorectal Cancer Screening 09/27/2025 9/27/2022    Hemoglobin A1c (Diabetic Prevention Screening) 03/01/2027 3/1/2024    Lipid Panel 03/01/2029 3/1/2024    TETANUS VACCINE 12/08/2030 12/8/2020        No orders of the defined types were placed in this encounter.      The following information is provided to all patients.  This information is to help you find resources for any of the problems found today that may be affecting your health:                  Living healthy guide: www.[facility].[place].gov      Understanding Diabetes: www.diabetes.org      Eating healthy: www.cdc.gov/healthyweight      CDC home safety checklist: www.cdc.gov/steadi/patient.html      Agency on Aging: www.goea.[place].[facility]      Alcoholics anonymous (AA): www.aa.org      Physical Activity: www.vicky.nih.gov/tn8urbq      Tobacco use: www.quitwithusla.org

## 2024-11-22 ENCOUNTER — PATIENT MESSAGE (OUTPATIENT)
Dept: ADMINISTRATIVE | Facility: HOSPITAL | Age: 69
End: 2024-11-22
Payer: MEDICARE

## 2025-02-03 ENCOUNTER — TELEPHONE (OUTPATIENT)
Dept: UROLOGY | Facility: CLINIC | Age: 70
End: 2025-02-03
Payer: MEDICARE

## 2025-02-03 NOTE — TELEPHONE ENCOUNTER
HYSTEROSCOPY WITH DILATATION AND CURETTAGE    Lencho Patton  [id number]    DATE OF PROCEDURE: 8/29/2017     PREOPERATIVE DIAGNOSIS: Post-menopausal bleeding [N95.0]     POSTOPERATWE DIAGNOSIS: Post-menopausal bleeding [N95.0]    PROCEDURE: Hysteroscopy with dilatation & curettage. SURGEON: Ousmane Carnes MD    ANESTHESIA: General.    DRAINS: Sterile in and out catheterization of the bladder. BLOOD LOSS ESTIMATED: Minimal    SPECIMENS:   Endometrial curettings      FINDINGS: Normal endocervical canal. Endometrial cavity was symmetrical, homogenous with palpation and curettage. Tubal ostia was readily visible, symmetrical and in appropriate locations. No visible evidence of any surface irregularities of the endometrial surface and no visible asymmetry to suggest a point of penetration or an embedded foreign object    PROCEDURE IN DETAIL: The patient was taken to the Operating Room. The patient was anesthetized using general. After adequate anesthesia was established she was properly positioned, scrubbed, prepped and draped. Time out was done to confirm the operating procedure, surgeon, patient and site. Once confirmed by the team, procedure was started. The weighted speculum was placed in the vault to expose the cervix, was grasped at 12 oclock with the single-tooth tenaculum and sounded to 8 cm. It was sequentially dilated prior to the hysteroscope being inserted with the above noted findings. A very gentle but homogenous curetting was done starting in the midline and working in a clockwise manner. A small amount of normal appearing endometrial tissue was reirieved. Janusz-Stone forceps were used to remove the clots and tissue. The hysteroscope was reinserted again. Pictures were taken pre-procedure and post-procedure to document the endometrial findings. There was no evidence of any surface irregularity to suggest recent penetration.  With this complete and thorough visual review, we terminated the This call is to remind you of your upcoming appointment on 2/5/2024 at 10:00 am located on the 4th floor clinic [facility] on [facility].  Thanks VS     procedure. There was no bleeding at the tenaculum site. With the sponge, instrument and needle count correct, the patient was awakened and taken to the Recovery Room in satisfactory condition. She will be discharged home to follow-up in the office in two weeks. She is to call for increased bleeding,  increased pain, fever, or any other concerns. Cheryl Estrada

## 2025-02-05 ENCOUNTER — PATIENT MESSAGE (OUTPATIENT)
Dept: ADMINISTRATIVE | Facility: OTHER | Age: 70
End: 2025-02-05
Payer: MEDICARE

## 2025-02-05 ENCOUNTER — LAB VISIT (OUTPATIENT)
Dept: LAB | Facility: HOSPITAL | Age: 70
End: 2025-02-05
Payer: MEDICARE

## 2025-02-05 ENCOUNTER — OFFICE VISIT (OUTPATIENT)
Dept: UROLOGY | Facility: CLINIC | Age: 70
End: 2025-02-05
Payer: MEDICARE

## 2025-02-05 ENCOUNTER — TELEPHONE (OUTPATIENT)
Dept: UROLOGY | Facility: CLINIC | Age: 70
End: 2025-02-05

## 2025-02-05 VITALS
BODY MASS INDEX: 25.46 KG/M2 | HEIGHT: 68 IN | SYSTOLIC BLOOD PRESSURE: 154 MMHG | HEART RATE: 90 BPM | DIASTOLIC BLOOD PRESSURE: 78 MMHG | WEIGHT: 168 LBS

## 2025-02-05 DIAGNOSIS — N40.1 BPH WITH URINARY OBSTRUCTION: Primary | ICD-10-CM

## 2025-02-05 DIAGNOSIS — R97.20 RISING PSA LEVEL: ICD-10-CM

## 2025-02-05 DIAGNOSIS — R97.20 RISING PSA LEVEL: Primary | ICD-10-CM

## 2025-02-05 DIAGNOSIS — N13.8 BPH WITH URINARY OBSTRUCTION: Primary | ICD-10-CM

## 2025-02-05 LAB — COMPLEXED PSA SERPL-MCNC: 4.1 NG/ML (ref 0–4)

## 2025-02-05 PROCEDURE — 36415 COLL VENOUS BLD VENIPUNCTURE: CPT

## 2025-02-05 PROCEDURE — 99213 OFFICE O/P EST LOW 20 MIN: CPT | Mod: PBBFAC

## 2025-02-05 PROCEDURE — 99999 PR PBB SHADOW E&M-EST. PATIENT-LVL III: CPT | Mod: PBBFAC,,,

## 2025-02-05 PROCEDURE — 84153 ASSAY OF PSA TOTAL: CPT

## 2025-02-05 PROCEDURE — 99214 OFFICE O/P EST MOD 30 MIN: CPT | Mod: S$PBB,,,

## 2025-02-05 NOTE — PROGRESS NOTES
CHIEF COMPLAINT:  PSA       HISTORY OF PRESENTING ILLINESS:  Jose Sahni is a 70 y.o. male is an established pt with the urology dept. He is here for monitoring of his rising PSA. His PSA a year ago was 2.2, 6 months ago it was 3.0. He denies any recent illness or ejaculation. He reports an occasional weak stream denies urinary frequency and burning. His LUTS isn't bothersome to him. States he doesn't need medication at this time.    Patient denies a FH of prostate cancer today but per Caitlyn note from 3/1/24 his paternal cousin had prostate cancer.         REVIEW OF SYSTEMS:  Review of Systems   Constitutional:  Negative for chills and fever.   Respiratory:  Negative for cough and shortness of breath.    Gastrointestinal:  Negative for abdominal pain, nausea and vomiting.   Genitourinary:  Positive for dysuria. Negative for flank pain, frequency, hematuria and urgency.         PATIENT HISTORY:    Past Medical History:   Diagnosis Date    Allergy     Anemia of unknown etiology 12/18/2019    -followed in hematology    Asymptomatic cholelithiasis     Basal cell carcinoma 04/2024    right lateral eyebrow-excised by Dr. Owens    Heart murmur     History of hepatitis C,s/p successful Rx w/ SVR24 (cure) - 6/2018 10/18/2016    GT2, tx naive  2014 liver biopsy, grade 1 stage 1 9-2017 Fibroscan = F2   Completed Epclusa 12wks w/ SVR    Hypertension     Tuberculosis     Hx postive       Past Surgical History:   Procedure Laterality Date    CARDIAC SURGERY  01/01/1962    COLONOSCOPY N/A 09/27/2022    Procedure: COLONOSCOPY;  Surgeon: Antonio Russo MD;  Location: [address]);  Service: Endoscopy;  Laterality: N/A;  Not vaccinated. will do home test and notify of results.EC    FRACTURE SURGERY  Jan 2009    Athrodesis    HAND SURGERY  01/01/2008    Left hand    HERNIA REPAIR  01/01/1967    orchiopexy, left  age 12       Family History   Problem Relation Name Age of Onset    Stroke Mother Julieta     Heart disease  Father Charles Sahni     Cancer Paternal Aunt Raina Sahni     Cancer Paternal Uncle Karan Sahni     Early death Paternal Cousin Adonis Sahni     Melanoma Neg Hx         Social History     Socioeconomic History    Marital status:    Tobacco Use    Smoking status: Never    Smokeless tobacco: Never   Substance and Sexual Activity    Alcohol use: Yes     Alcohol/week: 20.0 standard drinks of alcohol     Types: 20 Cans of beer per week    Drug use: No    Sexual activity: Not Currently     Partners: Female     Comment:  20 years     Social Drivers of Health     Financial Resource Strain: Low Risk  (8/21/2024)    Overall Financial Resource Strain (CARDIA)     Difficulty of Paying Living Expenses: Not hard at all   Food Insecurity: No Food Insecurity (8/21/2024)    Hunger Vital Sign     Worried About Running Out of Food in the Last Year: Never true     Ran Out of Food in the Last Year: Never true   Transportation Needs: No Transportation Needs (8/21/2024)    PRAPARE - Transportation     Lack of Transportation (Medical): No     Lack of Transportation (Non-Medical): No   Physical Activity: Insufficiently Active (8/21/2024)    Exercise Vital Sign     Days of Exercise per Week: 2 days     Minutes of Exercise per Session: 30 min   Stress: No Stress Concern Present (8/21/2024)    [employer] - Occupational Stress Questionnaire     Feeling of Stress : Not at all   Housing Stability: Low Risk  (8/21/2024)    Housing Stability Vital Sign     Unable to Pay for Housing in the Last Year: No     Homeless in the Last Year: No       Allergies:  Benazepril hcl    Medications:    Current Outpatient Medications:     ascorbic acid, vitamin C, (VITAMIN C) 1000 MG tablet, Take 1,000 mg by mouth once daily., Disp: , Rfl:     betamethasone dipropionate 0.05 % cream, AAA ears bid prn, Disp: 60 g, Rfl: 3    folic acid (FOLVITE) 1 MG tablet, Take 1 mg by mouth once daily., Disp: , Rfl:     losartan (COZAAR)  100 MG tablet, Take 1 tablet (100 mg total) by mouth once daily., Disp: 90 tablet, Rfl: 3    pravastatin (PRAVACHOL) 20 MG tablet, Take 1 tablet (20 mg total) by mouth once daily., Disp: 90 tablet, Rfl: 3    sildenafil (VIAGRA) 100 MG tablet, Take 1 tablet (100 mg total) by mouth daily as needed for Erectile Dysfunction., Disp: 30 tablet, Rfl: 2    triamcinolone acetonide 0.025% (KENALOG) 0.025 % cream, AAA gluteal cleft bid prn flare, Disp: 30 g, Rfl: 3    PHYSICAL EXAMINATION:  Physical Exam  Vitals reviewed.   Constitutional:       Appearance: Normal appearance.   HENT:      Head: Normocephalic and atraumatic.   Pulmonary:      Effort: Pulmonary effort is normal. No respiratory distress.   Genitourinary:     Comments: FERNANDEZ complete, smooth non nodular   Musculoskeletal:         General: Normal range of motion.   Skin:     General: Skin is warm and dry.      Capillary Refill: Capillary refill takes less than 2 seconds.   Neurological:      General: No focal deficit present.      Mental Status: He is alert.   Psychiatric:         Mood and Affect: Mood normal.         Behavior: Behavior normal. Behavior is cooperative.           LABS:      No UA.      Lab Results   Component Value Date    PSA 2.5 02/03/2022    PSA 1.4 12/04/2020    PSA 1.4 11/13/2019    PSADIAG 3.0 08/05/2024    PSADIAG 3.3 03/01/2024    PSADIAG 2.2 02/27/2023       Lab Results   Component Value Date    CREATININE 1.1 04/12/2024    EGFRNORACEVR >60.0 04/12/2024               IMPRESSION:    Encounter Diagnoses   Name Primary?    BPH with urinary obstruction Yes    Rising PSA level          Assessment:       1. BPH with urinary obstruction    2. Rising PSA level        Plan:     PSA today  Explained to pt PSA is done annually or more frequently if there is a sudden rise that needs to be monitored. Testing is done up until age 75.    I spent a total of 30 minutes on the day of the visit. This includes face to face time and non-face to face time  preparing to see the patient (eg, review of tests), obtaining and/or reviewing separately obtained history, documenting clinical information in the electronic or other health record, independently interpreting results and communicating results to the patient/family/caregiver, or care coordinator  Reviewed the possible contributory factors.

## 2025-02-05 NOTE — TELEPHONE ENCOUNTER
Informed pt of PSA result of 4.1 Next step will be to obtain a prostate MRI. Pt verbalized understanding.

## 2025-02-13 ENCOUNTER — TELEPHONE (OUTPATIENT)
Dept: UROLOGY | Facility: CLINIC | Age: 70
End: 2025-02-13
Payer: MEDICARE

## 2025-02-13 ENCOUNTER — HOSPITAL ENCOUNTER (OUTPATIENT)
Dept: RADIOLOGY | Facility: HOSPITAL | Age: 70
Discharge: HOME | End: 2025-02-13
Payer: MEDICARE

## 2025-02-13 DIAGNOSIS — R97.20 RISING PSA LEVEL: Primary | ICD-10-CM

## 2025-02-13 DIAGNOSIS — R97.20 RISING PSA LEVEL: ICD-10-CM

## 2025-02-13 DIAGNOSIS — R93.89 ABNORMAL MRI: ICD-10-CM

## 2025-02-13 DIAGNOSIS — R93.89 ABNORMAL MRI: Primary | ICD-10-CM

## 2025-02-13 LAB
CREAT SERPL-MCNC: 1.1 MG/DL (ref 0.5–1.4)
SAMPLE: NORMAL

## 2025-02-13 PROCEDURE — 72197 MRI PELVIS W/O & W/DYE: CPT | Mod: 26,,, | Performed by: RADIOLOGY

## 2025-02-13 PROCEDURE — A9585 GADOBUTROL INJECTION: HCPCS

## 2025-02-13 PROCEDURE — 72197 MRI PELVIS W/O & W/DYE: CPT | Mod: TC

## 2025-02-13 PROCEDURE — 25500020 PHARM REV CODE 255

## 2025-02-13 RX ORDER — CIPROFLOXACIN 500 MG/1
500 TABLET ORAL ONCE
Qty: 1 TABLET | Refills: 0 | Status: SHIPPED | OUTPATIENT
Start: 2025-02-13 | End: 2025-02-13

## 2025-02-13 RX ORDER — LIDOCAINE HYDROCHLORIDE 20 MG/ML
JELLY TOPICAL ONCE
OUTPATIENT
Start: 2025-02-13 | End: 2025-02-13

## 2025-02-13 RX ORDER — CEFTRIAXONE 1 G/1
1 INJECTION, POWDER, FOR SOLUTION INTRAMUSCULAR; INTRAVENOUS
OUTPATIENT
Start: 2025-02-13 | End: 2025-02-13

## 2025-02-13 RX ORDER — GADOBUTROL 604.72 MG/ML
10 INJECTION INTRAVENOUS
Status: COMPLETED | OUTPATIENT
Start: 2025-02-13 | End: 2025-02-13

## 2025-02-13 RX ADMIN — GADOBUTROL 10 ML: 604.72 INJECTION INTRAVENOUS at 08:02

## 2025-02-13 NOTE — TELEPHONE ENCOUNTER
Spoke with pt regarding MRI results. Would like to obtain a prostate biopsy next, pt would like to proceed.

## 2025-02-19 ENCOUNTER — TELEPHONE (OUTPATIENT)
Dept: UROLOGY | Facility: CLINIC | Age: 70
End: 2025-02-19
Payer: MEDICARE

## 2025-02-19 NOTE — TELEPHONE ENCOUNTER
LMVM. Instructions reviewed for ajay and cipro, hold off any blood thinners. Parking available on [address] open parking lot, check in on the 2nd floor of the [facility].

## 2025-02-20 ENCOUNTER — PROCEDURE VISIT (OUTPATIENT)
Dept: UROLOGY | Facility: CLINIC | Age: 70
End: 2025-02-20
Payer: MEDICARE

## 2025-02-20 VITALS
DIASTOLIC BLOOD PRESSURE: 70 MMHG | HEART RATE: 80 BPM | RESPIRATION RATE: 17 BRPM | BODY MASS INDEX: 25.51 KG/M2 | WEIGHT: 167.75 LBS | TEMPERATURE: 98 F | SYSTOLIC BLOOD PRESSURE: 148 MMHG

## 2025-02-20 DIAGNOSIS — R97.20 ELEVATED PSA: Primary | ICD-10-CM

## 2025-02-20 DIAGNOSIS — R93.89 ABNORMAL MRI: ICD-10-CM

## 2025-02-20 DIAGNOSIS — R97.20 RISING PSA LEVEL: ICD-10-CM

## 2025-02-20 PROCEDURE — 76942 ECHO GUIDE FOR BIOPSY: CPT | Mod: PBBFAC | Performed by: STUDENT IN AN ORGANIZED HEALTH CARE EDUCATION/TRAINING PROGRAM

## 2025-02-20 PROCEDURE — 55700 PR BIOPSY OF PROSTATE,NEEDLE/PUNCH: CPT | Mod: PBBFAC | Performed by: STUDENT IN AN ORGANIZED HEALTH CARE EDUCATION/TRAINING PROGRAM

## 2025-02-20 PROCEDURE — 96372 THER/PROPH/DIAG INJ SC/IM: CPT | Mod: PBBFAC

## 2025-02-20 PROCEDURE — 99999PBSHW PR PBB SHADOW TECHNICAL ONLY FILED TO HB: Mod: PBBFAC,,,

## 2025-02-20 PROCEDURE — 76872 US TRANSRECTAL: CPT | Mod: PBBFAC | Performed by: STUDENT IN AN ORGANIZED HEALTH CARE EDUCATION/TRAINING PROGRAM

## 2025-02-20 RX ORDER — LIDOCAINE HYDROCHLORIDE 10 MG/ML
10 INJECTION, SOLUTION INFILTRATION; PERINEURAL
Status: COMPLETED | OUTPATIENT
Start: 2025-02-20 | End: 2025-02-20

## 2025-02-20 RX ORDER — CEFTRIAXONE 1 G/1
1 INJECTION, POWDER, FOR SOLUTION INTRAMUSCULAR; INTRAVENOUS
Status: COMPLETED | OUTPATIENT
Start: 2025-02-20 | End: 2025-02-20

## 2025-02-20 RX ORDER — LIDOCAINE HYDROCHLORIDE 20 MG/ML
JELLY TOPICAL ONCE
Status: COMPLETED | OUTPATIENT
Start: 2025-02-20 | End: 2025-02-20

## 2025-02-20 RX ADMIN — LIDOCAINE HYDROCHLORIDE: 20 JELLY TOPICAL at 01:02

## 2025-02-20 RX ADMIN — CEFTRIAXONE SODIUM 1 G: 1 INJECTION, POWDER, FOR SOLUTION INTRAMUSCULAR; INTRAVENOUS at 01:02

## 2025-02-20 RX ADMIN — LIDOCAINE HYDROCHLORIDE 10 ML: 10 INJECTION, SOLUTION INFILTRATION; PERINEURAL at 01:02

## 2025-02-20 NOTE — PATIENT INSTRUCTIONS
What to Expect After a Prostate Biopsy    You may have mild bleeding from the rectum or urine for about 1 week to 1 month, or in your ejaculate for several months. This bleeding is normal and expected, and it will stop. You may have mild discomfort in your rectal or urethral area for 24-48 hours.    You cannot do any strenuous lifting, straining, or exercising for 24 hours. You may return to full activity the day after the biopsy.    You may continue to take all your regular medications after the procedure except for the blood thinners.    You may resume all blood-thinning medications once you no longer see any bleeding or whenever your physician prescribing the medication says it is all right to do so. You may take Tylenol if you have a fever and your temperature is less than 100° F or if you have some discomfort.    You will receive a call from the Urology Department at [facility] with the results of your prostate biopsy within one week.    Signs and Symptoms to Report    Call your [facility] urologist at [phone] if you develop any of the following:  Temperature greater than 101°  F  Inability to urinate  A large amount of bleeding from the rectum or in the urine  Persistent or severe pain    After hours or on weekends, you may reach a urology resident on call at this number: [phone].

## 2025-02-20 NOTE — PROCEDURES
"DATE OF PROCEDURE:  2/20/2025          PROCEDURE: 3-DIMENSIONAL TRANSRECTAL ULTRASONOGRAPHY OF THE PROSTATE AND MRI/TRUS FUSION-GUIDED TARGETED AND SYSTEMATIC PROSTATE NEEDLE BIOPSY     Stereotactic template-guided biopsy with targeted and systematic sampling       INDICATION:Prostate neoplasm NOS/elevated PSA with radiologic area of interest(s) on mpMRI suspicious for neoplasm. Procedure includes complexity due to either >1 negative prior biopsies, under grading of cancer and/or persistent elevated PSA presenting unusual challenges in diagnosing and characterizing suspected cancer, enabling the fusion-targeted biopsy of exceptional benefit to detect or rule-out cancer.  MRI-TRUS fusion biopsy is not widely available and is being utilized for the aforementioned reasons.  It takes extraordinary training and commitment to targeted procedures and has a several -fold increased likelihood of detecting clinically significant cancer compared to conventional biopsy.  It consumes at least 3-5 fold the amount of time/work effort and requires 2 assistants along with the fusion device (Modifier -22).           NARRATIVE:  Upon entering the surgical suite, a "time out" was called and the patient, body site and surgical procedure was verified with both the nursing staff and patient according to [facility]. I and the nursing staff verified that the patient had taken his prescribed antibiotics as directed: Ciprofloxacin 500 mg po once and Ceftriaxone one gram intramuscularly approximately at least 30 minutes before the procedure.            After informed consent, the patient was placed in the left lateral, knee-chest decubitus position with the prostate positioned within the magnetic field.  A digital rectal exam was performed and the vault was empty. The transrectal ultrasound probe equipped with a magnetic tracking device was inserted per rectum in standard fashion.  15-20cc of plain Lidocaine was infused at the level " of the SV-prostatic junction and the apices, bilaterally.  Realtime ultrasonography and hard copies of the prostate, seminal vesicles, bladder, and posterior urethra were obtained in both transverse and sagittal dimensions.  Gain, depth and other parameters were adjusted to optimize image quality. The images were acquired by routine transrectal ultrasound (CPT 30415).  2D ultrasound cannot be used to plan in 3D space, so I gathered a series of images using specialized software and equipment.  I then reviewed the images in reconstructed 3D space (CPT 04765), and proceeded to fuse the TRUS images with the mpMRI. I  looked for target lesions and confirmed the planned locations for biopsy specimens (CPT 84933) as determined by the fusion software and my input.  The reconstruction shows a fusion of the ultrasound with multiparametric MRI and optimal biopsy distribution, and then directs me to the locations.  After incorporating my observations into the biopsy plan, I proceeded to take the biopsies (CPT 45327 and 78398) shown in this report.  The 3D enhanced procedure with fusion technology allows me to biopsy precisely the abnormality that is present, resulting in a faster and more accurate diagnosis for the patient.  Ultrasound findings include:          SEMINAL VESICLES/VASA:  Normal in size, shape, and echotexture.  No mass or cyst seen.  Vasa appear unremarkable.          BLADDER:  Partially full of urine. No intravesical mass was appreciated; no significant wall thickening noted.  A median lobe was not present.       FERNANDEZ: negative    PROSTATE:  The dimensions were 4.6 cm width x 3.0 cm height x 4.2 cm length (last dimension is sagittal).  The estimated prostate volume is 31 cc.  PSAD: 0.13.  The prostate displayed normal echogenicity, minimal punctate calcifications, and hypoechoic areas.  The MRI lesion was visible and hypoechoic on ultrasound.  Prostate appears symmetrical with distinct margins.           PERIPROSTATIC FAT/PERIRECTAL SPACE: Periprostatic fat appear unremarkable and perirectal space is within normal limits.          URETHRA AND GENITOURINARY SPHINCTER:  Normal echotexture.          MRI-TRUS FUSION-GUIDED PROSTATE BIOPSY: Biopsies were taken using an end-fire approach. 3-Dimensional MRI-TRUS Fusion-guided prostate biopsies were taken from each lesion as described:        Index target epicenter:  Three (3) cores        LUZMARIA #1 [index lesion]:  AFMS , Four (4)biopsies         LUZMARIA #2 [satellite lesion]: right, transition zone, medial, base.  Three (3)biopsies           Comment: good targeting -- biopsies were performed in the endfire approach  - Would benefit from TP biopsy in future if target 1 is non-diagnostic      HIFU Candidacy:  not good, due to calcifications and anterior lesion above urethra      Additionally the patient did elect to have random non-targeted biopsies taken as conventional 12 cores (the left and right base, mid and apical sections, medial and lateral sampling). The biopsy specimens were sent to [facility] for histological diagnosis.      Post-procedure instructions were provided. He was advised to complete any remaining antibiotics as prescribed. He was given information regarding the signs and symptoms of prostatitis and urosepsis, and told to report to the nearest health care facility should this occur.  The patient was told that he needs to make an appointment to discuss the results of the biopsy.       RTC in 2 weeks to discuss biopsy results.    COMPLEXITY STATEMENT (22-modifier): The targeted biopsy procedure involves MRI/US fusion, which substantially increases the technical difficulty of the procedure, the intensity of concentration required to perform the procedure, lengthy extra training to master the procedure, and by a factor of at least 3-5x, the time required to perform the procedure, when compared to the standard systematic prostate biopsy.          PQRS:      [id number] - Documentation of order for prophylactic antibiotic given within 1 or 2 hrs, see drug listing     [id number] - Documentation that prophylactic antibiotic has been given within 1 or 2 hrs, see drug listing

## 2025-03-10 ENCOUNTER — LAB VISIT (OUTPATIENT)
Dept: LAB | Facility: HOSPITAL | Age: 70
End: 2025-03-10
Payer: MEDICARE

## 2025-03-10 ENCOUNTER — RESULTS FOLLOW-UP (OUTPATIENT)
Dept: INTERNAL MEDICINE | Facility: CLINIC | Age: 70
End: 2025-03-10
Payer: MEDICARE

## 2025-03-10 ENCOUNTER — OFFICE VISIT (OUTPATIENT)
Dept: UROLOGY | Facility: CLINIC | Age: 70
End: 2025-03-10
Payer: MEDICARE

## 2025-03-10 VITALS
DIASTOLIC BLOOD PRESSURE: 60 MMHG | HEART RATE: 87 BPM | HEIGHT: 68 IN | SYSTOLIC BLOOD PRESSURE: 140 MMHG | WEIGHT: 169.75 LBS | BODY MASS INDEX: 25.73 KG/M2

## 2025-03-10 DIAGNOSIS — I10 HYPERTENSION, ESSENTIAL: ICD-10-CM

## 2025-03-10 DIAGNOSIS — C61 PROSTATE CANCER: Primary | ICD-10-CM

## 2025-03-10 LAB
ANION GAP SERPL CALC-SCNC: 7 MMOL/L (ref 8–16)
BUN SERPL-MCNC: 26 MG/DL (ref 8–23)
CALCIUM SERPL-MCNC: 9 MG/DL (ref 8.7–10.5)
CHLORIDE SERPL-SCNC: 107 MMOL/L (ref 95–110)
CO2 SERPL-SCNC: 22 MMOL/L (ref 23–29)
CREAT SERPL-MCNC: 1.3 MG/DL (ref 0.5–1.4)
EST. GFR  (NO RACE VARIABLE): 59.5 ML/MIN/1.73 M^2
GLUCOSE SERPL-MCNC: 97 MG/DL (ref 70–110)
POTASSIUM SERPL-SCNC: 5 MMOL/L (ref 3.5–5.1)
SODIUM SERPL-SCNC: 136 MMOL/L (ref 136–145)

## 2025-03-10 PROCEDURE — 36415 COLL VENOUS BLD VENIPUNCTURE: CPT | Performed by: FAMILY MEDICINE

## 2025-03-10 PROCEDURE — 99213 OFFICE O/P EST LOW 20 MIN: CPT | Mod: PBBFAC | Performed by: STUDENT IN AN ORGANIZED HEALTH CARE EDUCATION/TRAINING PROGRAM

## 2025-03-10 PROCEDURE — 80048 BASIC METABOLIC PNL TOTAL CA: CPT | Performed by: FAMILY MEDICINE

## 2025-03-10 PROCEDURE — 99215 OFFICE O/P EST HI 40 MIN: CPT | Mod: S$PBB,,, | Performed by: STUDENT IN AN ORGANIZED HEALTH CARE EDUCATION/TRAINING PROGRAM

## 2025-03-10 PROCEDURE — 99999 PR PBB SHADOW E&M-EST. PATIENT-LVL III: CPT | Mod: PBBFAC,,, | Performed by: STUDENT IN AN ORGANIZED HEALTH CARE EDUCATION/TRAINING PROGRAM

## 2025-03-10 NOTE — PROGRESS NOTES
[facility]  Urologic Oncology Clinic Note        Date of Service: 3/10/2025    Chief Complaint/Reason for Consultation: Prostate Cancer, Favorable Intermediate Risk    Requesting Provider:   No referring provider defined for this encounter.      History of Present Illness:   Patient 70 y.o. male presents with favorable intermediate prostate cancer to discuss treatment options. Presents with wife.      Blood thinners: None  No Hx of TIA, MI, and CVA   Abdominal surgeries:  Undescended testis, open heart surgery at 7 years old      Here today to discuss prostate biopsy results.    Urologic History:     2/4/2025 -- Seen by Letty ARMENTA for elevated PSA who referred for biopsy  2/5/2025 -- PSA 4.1 (from 3.0 year prior)  2/13/2025 -- MRI Prostate showed PV 33 cc with pirads 5 in RM anterior TZ and pirads 4 in RM posterior TZ  2/20/2025 -- [facility]ronav Biopsy: Target 2 RB TZ with 3+4, RA 3+4, GARAY, RML and RAL with 3+3    Patient Active Problem List    Diagnosis Date Noted    Coronary artery calcification 08/15/2024    Rising PSA level 02/27/2023    Hyperlipidemia 02/27/2023    Colon polyps 09/27/2022    Smoldering myeloma 07/10/2020    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    Bicuspid aortic valve 03/14/2018    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    Hepatic steatosis 10/27/2017    AK (actinic keratosis) 01/25/2017    History of hepatitis C,s/p successful Rx w/ SVR24 (cure) - 6/2018 10/18/2016    Hypertension, essential 09/23/2015    Hereditary spherocytic hemolytic anemia 09/23/2015    Splenomegaly 09/03/2013    Other acquired hemolytic anemias         Prescriptions Prior to Admission[1]  Review of patient's allergies indicates:   Allergen Reactions    Benazepril hcl Other (See Comments)     Coughing        Past Medical History:   Diagnosis Date    Allergy     Anemia of unknown etiology 12/18/2019    -followed in hematology    Asymptomatic cholelithiasis     Basal cell carcinoma 04/2024    right lateral  "eyebrow-excised by Dr. Owens    Heart murmur     History of hepatitis C,s/p successful Rx w/ SVR24 (cure) - 6/2018 10/18/2016    GT2, tx naive  2014 liver biopsy, grade 1 stage 1 9-2017 Fibroscan = F2   Completed Epclusa 12wks w/ SVR    Hypertension     Tuberculosis     Hx postive      Past Surgical History:   Procedure Laterality Date    CARDIAC SURGERY  01/01/1962    COLONOSCOPY N/A 09/27/2022    Procedure: COLONOSCOPY;  Surgeon: Antonio Russo MD;  Location: [facility] ([address] [place] [address]);  Service: Endoscopy;  Laterality: N/A;  Not vaccinated. will do home test and notify of results.EC    FRACTURE SURGERY  Jan 2009    Athrodesis    HAND SURGERY  01/01/2008    Left hand    HERNIA REPAIR  01/01/1967    orchiopexy, left  age 12      Family History   Problem Relation Name Age of Onset    Stroke Mother Julieta     Heart disease Father Charles Sahni     Cancer Paternal Aunt Raina Sahni     Cancer Paternal Uncle Karan Sahni     Early death Paternal Cousin Adonis Sahni     Melanoma Neg Hx        Social History     Tobacco Use    Smoking status: Never    Smokeless tobacco: Never   Substance Use Topics    Alcohol use: Yes     Alcohol/week: 20.0 standard drinks of alcohol     Types: 20 Cans of beer per week        Review of Systems   Constitutional:  Negative for activity change and unexpected weight change.   HENT:  Negative for congestion.    Respiratory:  Negative for cough.    Genitourinary:  Negative for hematuria.             OBJECTIVE:     Vitals:    03/10/25 1447   BP: (!) 140/60   BP Location: Right arm   Patient Position: Sitting   Pulse: 87   Weight: 77 kg (169 lb 12.1 oz)   Height: 5' 8" (1.727 m)          General Appearance: Alert, cooperative, no distress  Head: Normocephalic  Eyes: Clear conjunctiva  Neck: No obvious LND or JVD  Lungs: Normal chest excursion, no accessory muscle use  Chest: Regular rate rhythm by palpation, no pedal edema  Abdomen: Soft, non-tender, non-distended,   Male Genitalia: FERNANDEZ " neg  Extremities: Atraumatic   Lymph Nodes: No appreciable lymph adenopathy  Neurologic: No gross gait, motor or sensory deficits            LAB:      All laboratory values listed below was/were independently reviewed with patient at this clinic visit.    Lab Results   Component Value Date    PSA 2.5 02/03/2022    PSA 1.4 12/04/2020    PSA 1.4 11/13/2019    PSA 1.1 05/01/2018    PSA 1.5 01/25/2018    PSA 0.98 01/26/2017    PSA 0.89 09/23/2015    PSA 0.69 08/15/2013    PSA 0.61 07/11/2011    PSADIAG 4.1 (H) 02/05/2025    PSADIAG 3.0 08/05/2024    PSADIAG 3.3 03/01/2024    PSADIAG 2.2 02/27/2023    PSADIAG 2.1 03/08/2022   -    IMAGING:      All imaging listed below was/were independently reviewed with patient at this clinic visit.    2/13/2025  MRI PROSTATE W W/O CONTRAST     CLINICAL HISTORY:  Prostate cancer suspected;  Elevated prostate specific antigen (PSA)     Additional history: None provided.     TECHNIQUE:  Multiparametric MRI of the prostate/pelvis performed on a 3T scanner with phase pelvic coil. Multiplanar, multisequence images including high resolution, small field-of-view T2-WI; axial diffusion weighted images with multiple B-values and creation of ADC-maps; and dynamic contrast enhanced T1-weighted images through the prostate were obtained before, during, and after the administration of 10 cc intravenous gadolinium.     COMPARISON:  PET-CT 10/20/2020     FINDINGS:  Previous biopsy: None.     PSA: 4.1 ng/mL 02/05/2025     Prior therapy: None     Prostate: 4.7 x 4.0 x 3.4 cm corresponding to a computed volume of 33.02 cc.     Peripheral zone: No suspicious lesions identified.     Transitional zone:     Lesion (LUZMARIA) #T-1     Location: Side:right; Region: mid; Zone: anterior     Greatest dimension: 1.7 cm     T2-WI: Lenticular or non-circumscribed, homogeneous,moderately hypointense, and greater than 1.5 cm in greatest dimension.     DWI/ADC: Same as 4 but ?1.5 cm in greatest dimension or definite  extraprostateic extension/invasive behavior, score 5.     DCE: Positive     Prostate Margin: Tumor contact with capsule, without evidence of extraprostatic extension     PI-RADS assessment category: 5     Lesion (LUZMARIA) #T-2     Location: Side:right; Region: mid; Zone: posterior     Greatest dimension: 0.8 cm     T2-WI: Lenticular or non-circumscribed, homogeneous,moderately hypointense, and <1.5 cm in greatest dimension, score 4.     DWI/ADC: Focal markedly hypointense on ADC and markedly hyperintense on high b-value DWI; <1.5 cm in greatest dimension, score 4.     DCE: Positive     Prostate Margin: No involvement-clearly confined tumor     PI-RADS assessment category: 4     Neurovascular bundle: Normal     Seminal vesicles: Normal appearance.     Adjacent Organ Involvement: No evidence for urinary bladder or rectal invasion.     Lymphadenopathy: None.     Other Findings: Right rectus femoris intramuscular lipoma.  Small left fat containing inguinal hernia.  Diverticulosis coli.     Impression:     1. Two suspicious lesions are identified in the transitional zone at the mid prostate, as above.  Overall Assessment: 5     Extraprostatic extension: Negative     Number of targets created for potential MR/US fusion biopsy     Peripheral zone: 0     Transition zone: 2     Electronically signed by resident: Chanell Lewis  Date:                                            02/13/2025  Time:                                           10:15     Electronically signed by:Flash Cox MD  Date:                                            02/13/2025  Time:                                           11:11      ASSESSMENT/PLAN:     71 yo male with favorable intermediate prostate cancer    Plan:    Prostate cancer, favorable intermediate risk  We discussed the nature and clinical staging of his prostate cancer.  The treatment options for prostate cancer include but are not limited to expectant management, active surveillance, hormonal  therapy (medical or surgical castration), interstitial radioactive seeds (brachytherapy), external beam radiotherapy, cryosurgery, HIFU, chemotherapy or radical prostatectomy.  The risks, benefits, possible complications and alternatives of each were discussed, compared and contrasted with one another.  Long-term obstructive or irritative urinary problems occur in a subset of patients following observation, active surveillance or radiation.  Temporary proctitis following radiation persists in some patients long-term in a small but significant subset and is rare during observation, active surveillance or after prostatectomy.  Regarding radiation therapy, brachytherapy has similar effects as external beam radiotherapy with regard to erectile dysfunction and proctitis but can also exacerbate urinary obstructive symptoms.  Proton beam therapy offers no clinical advantage over other forms of definitive treatment. Erectile dysfunction occurs in many patients after radical prostatectomy or radiation, and that ejaculation will be lacking despite preserved ability to attain orgasm, whereas observation does not cause such sexual dysfunction, however sexual dysfunction can still decline over time in some men without prostate cancer treatment.  Whole gland cryotherapy is associated with worse sexual side effects and similar urinary side effects as those after radiotherapy. Temporary urinary incontinence occurs in most patients after prostatectomy and persists long-term in a small but significant subset, more than during observation, active surveillance or after radiation.       I told the patient that according to NCCN criteria, he has intermediate risk prostate cancer.  In particular he has favorable intermediate risk disease due to PSA <10 and PGG 2. The clinical implications of this were discussed.  Radical prostatectomy is associated with improved overall survival when compared to watchful waiting or observation strategies.  In this risk group, patients are at higher risk for positive surgical margins or incomplete treatment, prostate cancer recurrence, progression, extraprostatic disease and the need for further treatment when compared to a low risk group.  The standard treatment options for this disease category include surgery, and radiation therapy plus androgen deprivation therapy (ADT). The use of ADT with radiation increases the likelihood and severity of adverse treatment-related events on sexual function in most men and can cause other systemic side effects. For patients with favorable intermediate risk disease, prostate cancer can be treated with radiation alone, but the evidence basis is less robust than for combining radiotherapy with ADT.  Cryotherapy may also be considered as a treatment option for select patients with intermediate risk disease.  The patient understands that he may need multimodal therapy or other salvage therapies after primary treatment for his prostate cancer.  For example, if the patient elected radical prostatectomy, subsequent radiation therapy may be recommended if adverse pathologic features were identified. Additionally, for patients with unfavorable intermediate risk disease, staging studies are recommended, comprising either a MRI or CT and a bone scan.  Thus far the patient has had a mpMRI. The patient had the opportunity to ask questions regarding the implications of intermediate risk prostate cancer and these were answered.            Today I discussed with the patient that based on is his NCCN  favorable intermediate risk  status that a PSMA PET scan is warranted to determine risk of metastatic disease. I informed him that if metastatic disease is identified treatment options would change. Thus we have placed the order for scan.         Patients can become proactively involved with their care via modifiable health-related behaviors and/or risk factors.  Smoking and obesity may adversely  impact treatment outcomes and increase the risk of general surgical complications in those men electing therapy for prostate cancer.  Smoking in general is associated with poor erectile function and urinary problems.  Thus, exercise, a balanced wholesome diet and abstinence from these and other negative modifiable health-related behaviors is recommended.      We then discussed the risks and benefits of radical prostatectomy and pelvic lymphadenectomy. Prostatectomy can relieve preoperative urinary obstructive symptoms.  Pelvic lymph node dissection is recommended for those with unfavorable intermediate risk or high risk disease. We discussed both immediate and long-term side effects of surgery.  The potential risks include but are not limited to bleeding/transfusion, infection, reaction to anesthesia, heart attack, deep venous thrombosis/pulmonary embolus, death or other adverse medical outcome, anastomotic stricture, incontinence, impotence, rectal injury or fistula with need for colostomy/additional surgery, injury to surrounding structures (nerves, vessels, ureter, bowel/bowel obstruction, etc.), failure to completely eradicate the tumor/positive margins or the need for further therapy.  I told him that after surgery, his libido should be unchanged, he may or may not have normal spontaneous erections but that he would likely have a dry orgasm.  I told him that additional procedures may be necessary to address strictures, incontinence, impotence, rectal fistula, drainage of a lymphocele, etc. Younger (<65 years of age) or healthier (>10 year life expectancy) men are more likely to experience cancer control benefits from prostatectomy than older men.  Older men experience higher rates of permanent erectile dysfunction and urinary incontinence after prostatectomy compared to younger men. For men who are found to have locally extensive cancer with prostatectomy, additional radiation therapy may be recommended.  I  gave him the option of discussing radiation treatment options with our radiation oncologist or cancer therapies with our genitourinary oncologist.  He had the opportunity to ask questions and his questions were answered to his satisfaction.  I thought he would be a GOOD candidate for RALP      We also spoke about the usual preoperative care, the bowel prep, the usual course of hospitalization and postoperative care.  If he decides to undergo surgery, most men will need to abstain from NSAIDs and blood thinners usually two weeks prior to surgery but that also depends on the medication and the particular clinical situation.  I told him that if he has any concerns about temporarily discontinuing his anticoagulants, he will need to take the initiative to discuss this with his internist, cardiologist or prescriber of those medications/anticoagulants.       We did encourage the patient to obtain an independent opinion from each of medical oncology and radiation oncology, and did offer to place a referral.      Ultimately, we did inform the patient that selection of a management strategy for localized prostate cancer should incorporate shared decision-making along with cancer severity (NCCN risk category), patient values and preferences, life expectancy [also encouraged to seek advice from internist/primary care], pre-treatment general functional and genitourinary symptoms [documented above], expected post-treatment functional status and potential for salvage treatment.      In addition to traditional management options for prostate cancer we discussed ablative techniques for prostate cancer. We discussed that in this scenario the prostate remains in the body and different energy sources such as high-intensity focused ultrasound (heat) or cryoablation (freeze) are used to destroy/kill prostate tissue and as result treat prostate cancer. The goal is to use a limited amount of energy in a focused approach to treat only the  cancer present and not the whole gland limiting overall side-effects associated with whole gland treatment such as urinary incontinence, erectile dysfunction, or trouble voiding.        We talked about ablation using cryoablation. I told him that with this procedure his rate of metastasis and cancer survival would likely be unchanged in the long-term with likely better preservation of his urinary and sexual function. Lastly, we spoke about the fact that if he would like to proceed with cryoablation that he would need to continue surveillance of his gland by prostate biopsy and prostate MRI in the future, without continued surveillance there is no way to ensure cancer control and provide cure. To this end, the patient understood counseling, was allowed to ask questions which were answered, and committed to surveillance follow up.    He is in agreement with surveillance.     With respect to ablation I discussed potential side-effects. Discussed that the major risks of the operation include damage to the urethra resulting in urethral stricture and damage to the rectum resulting in recto-urethral fistula. Discussed that in the modern era with new technology including better ultrasound active monitoring and urethral warming catheters these adverse major risks are very rare and only seen in 1-3% of cases. Discussed that more frequent and minimal side-effects include short term urgency, frequency, urinary retention requiring fowler, UTI requiring antibiotics, epididymal-orchitis requiring antibiotics, and dysuria. Discussed that also loss of erectile function may be common though generally less when compared with radical prostatectomy    Lastly, we discussed that while ablation of prostate tissue is for curative intent treatment of prostate cancer, there is always a risk of local recurrence which increases with prostate cancer NCCN risk category. Discussed that metastatic recurrence is rare in the short term with close  monitoring following treatment.    Patient aware that he will require catheter for 7 days following procedure.    Patient understood all counseling well, in agreement with above, and all questions were answered.     Will see back in 2 weeks with PET scan and to discuss definitive management of his prostate cancer.        Chepe Hand MD  Urologic Oncology  P: [phone]           [1] (Not in a hospital admission)

## 2025-03-11 ENCOUNTER — OFFICE VISIT (OUTPATIENT)
Dept: INTERNAL MEDICINE | Facility: CLINIC | Age: 70
End: 2025-03-11
Attending: FAMILY MEDICINE
Payer: MEDICARE

## 2025-03-11 VITALS
WEIGHT: 170 LBS | OXYGEN SATURATION: 98 % | SYSTOLIC BLOOD PRESSURE: 122 MMHG | RESPIRATION RATE: 16 BRPM | HEART RATE: 89 BPM | TEMPERATURE: 98 F | DIASTOLIC BLOOD PRESSURE: 62 MMHG | HEIGHT: 68 IN | BODY MASS INDEX: 25.76 KG/M2

## 2025-03-11 DIAGNOSIS — D47.Z9 LOW GRADE B CELL LYMPHOPROLIFERATIVE DISORDER: ICD-10-CM

## 2025-03-11 DIAGNOSIS — D47.2 SMOLDERING MYELOMA: ICD-10-CM

## 2025-03-11 DIAGNOSIS — R79.9 ABNORMAL BLOOD CHEMISTRY: ICD-10-CM

## 2025-03-11 DIAGNOSIS — I10 HYPERTENSION, ESSENTIAL: Primary | ICD-10-CM

## 2025-03-11 DIAGNOSIS — D58.0 HEREDITARY SPHEROCYTIC HEMOLYTIC ANEMIA: Chronic | ICD-10-CM

## 2025-03-11 DIAGNOSIS — I25.10 CORONARY ARTERY CALCIFICATION: ICD-10-CM

## 2025-03-11 DIAGNOSIS — Q23.81 BICUSPID AORTIC VALVE: ICD-10-CM

## 2025-03-11 DIAGNOSIS — E78.5 HYPERLIPIDEMIA, UNSPECIFIED HYPERLIPIDEMIA TYPE: ICD-10-CM

## 2025-03-11 DIAGNOSIS — C61 PROSTATE CANCER: ICD-10-CM

## 2025-03-11 PROCEDURE — 99215 OFFICE O/P EST HI 40 MIN: CPT | Mod: PBBFAC | Performed by: FAMILY MEDICINE

## 2025-03-11 PROCEDURE — 99999 PR PBB SHADOW E&M-EST. PATIENT-LVL V: CPT | Mod: PBBFAC,,, | Performed by: FAMILY MEDICINE

## 2025-03-11 NOTE — ASSESSMENT & PLAN NOTE
-controlled, Benicar recent sub for losartan  -home numbers hi that do not correlate with nml clinic numbers

## 2025-03-11 NOTE — ASSESSMENT & PLAN NOTE
-had been followed by  for rising PSA  -MRI 2/13/2025 (2) lesions, no EPE  -Path 2/20/2025 Shaun 3+3=6 (2)    -procedure under contemplation

## 2025-03-11 NOTE — PROGRESS NOTES
Subjective:       Patient ID: Jose Sahni is a 70 y.o. male.    Chief Complaint: Back Pain    Established patient follows up for management of chronic medical illnesses with complaints today. Please see dictation and ROS for interval problems, specific complaints and disease management discussion.    Past, Surgical, Family, Social, Histories; Medications, allergies reviewed and reconciled.  Health maintenance file reviewed and addressed items due. Recent applicable lab, imaging and cardiovascular results reviewed.  Problem list items reviewed and modified or added entries (in the overview section) may not be transcribed into this encounter note due to note writer format.      Blood pressure and laboratory follow-up.  Since seeing me last was diagnosed with prostate cancer.  Consideration for procedure pending.    Exercises by walking dog.  No exertional complaints.    Blood pressure today 130/50 right and 110/50 left.    Slight rise in creatinine.  No recheck in 6 weeks.    Blood pressure medication change by digital hypertension service.  Blood pressure under good control today.    Appears to be overdue for follow-ups with cardiology and also Hematology-Oncology.  Multiple diagnoses concerning blood abnormalities. MGUS type picture.  No symptoms of bruising, constitutional complaints at this time.        Review of Systems   Constitutional:  Negative for appetite change, chills, diaphoresis, fatigue and fever.   HENT:  Negative for congestion, postnasal drip, rhinorrhea, sore throat and trouble swallowing.    Eyes:  Negative for visual disturbance.   Respiratory:  Negative for cough, choking, chest tightness, shortness of breath and wheezing.    Cardiovascular:  Negative for chest pain and leg swelling.   Gastrointestinal:  Negative for abdominal distention, abdominal pain, diarrhea, nausea and vomiting.   Genitourinary:  Negative for difficulty urinating and hematuria.   Musculoskeletal:  Positive for back pain.  Negative for arthralgias and myalgias.   Skin:  Negative for rash.   Neurological:  Negative for weakness, light-headedness and headaches.   Psychiatric/Behavioral:  Negative for confusion and dysphoric mood.        Objective:      Physical Exam  Vitals and nursing note reviewed.   Constitutional:       Appearance: He is well-developed. He is not diaphoretic.   HENT:      Head: Normocephalic and atraumatic.   Eyes:      General: No scleral icterus.     Conjunctiva/sclera: Conjunctivae normal.   Cardiovascular:      Rate and Rhythm: Normal rate and regular rhythm.      Heart sounds: Normal heart sounds. No murmur heard.     No friction rub. No gallop.   Pulmonary:      Effort: Pulmonary effort is normal. No respiratory distress.      Breath sounds: Normal breath sounds. No wheezing or rales.   Abdominal:      General: There is no distension.      Tenderness: There is no abdominal tenderness.   Musculoskeletal:         General: No deformity.      Cervical back: Normal range of motion and neck supple.   Skin:     General: Skin is warm and dry.      Findings: No erythema or rash.   Neurological:      Mental Status: He is alert and oriented to person, place, and time.      Cranial Nerves: No cranial nerve deficit.      Motor: No tremor.      Coordination: Coordination normal.      Gait: Gait normal.   Psychiatric:         Behavior: Behavior normal.         Thought Content: Thought content normal.         Judgment: Judgment normal.         Assessment:       1. Hypertension, essential    2. Hyperlipidemia, unspecified hyperlipidemia type    3. Hereditary spherocytic hemolytic anemia    4. Smoldering myeloma    5. Low grade B cell lymphoproliferative disorder    6. Coronary artery calcification    7. Bicuspid aortic valve    8. Prostate cancer    9. Abnormal blood chemistry        Plan:     Medication List with Changes/Refills   Current Medications    ASCORBIC ACID, VITAMIN C, (VITAMIN C) 1000 MG TABLET    Take 1,000 mg by  mouth once daily.    BETAMETHASONE DIPROPIONATE 0.05 % CREAM    AAA ears bid prn    FOLIC ACID (FOLVITE) 1 MG TABLET    Take 1 mg by mouth once daily.    OLMESARTAN (BENICAR) 40 MG TABLET    Take 1 tablet (40 mg total) by mouth once daily.    PRAVASTATIN (PRAVACHOL) 20 MG TABLET    Take 1 tablet (20 mg total) by mouth once daily.    SILDENAFIL (VIAGRA) 100 MG TABLET    Take 1 tablet (100 mg total) by mouth daily as needed for Erectile Dysfunction.    TRIAMCINOLONE ACETONIDE 0.025% (KENALOG) 0.025 % CREAM    AAA gluteal cleft bid prn flare     1. Hypertension, essential  Overview:  -digital HTN    Assessment & Plan:  -controlled, Benicar recent sub for losartan  -home numbers hi that do not correlate with nml clinic numbers      2. Hyperlipidemia, unspecified hyperlipidemia type  Overview:  -naive LDL <60      3. Hereditary spherocytic hemolytic anemia  Overview:  -see Dr. Sahni's 9/7/2013 phone notes  -followed in hematology    >>OVERVIEW FOR OTHER ACQUIRED HEMOLYTIC ANEMIAS WRITTEN ON 2/27/2023 11:01 AM BY LENARD MCKINNEY MD    -followed in hematology    Orders:  -     Ambulatory referral/consult to Hematology / Oncology; Future; Expected date: 03/18/2025    4. Smoldering myeloma  Overview:  -followed in hematology    Orders:  -     Ambulatory referral/consult to Hematology / Oncology; Future; Expected date: 03/18/2025    5. Low grade B cell lymphoproliferative disorder  Overview:  -followed in hematology    Orders:  -     Ambulatory referral/consult to Hematology / Oncology; Future; Expected date: 03/18/2025    6. Coronary artery calcification  Overview:  -or calc noted on 2020 PET CT (hem onc)  -on statin with LDL <70      7. Bicuspid aortic valve  Overview:  -1/2018 echo suggests  -followed in cardiology    Assessment & Plan:  -no decompensated sx.      8. Prostate cancer  Assessment & Plan:  -had been followed by  for rising PSA  -MRI 2/13/2025 (2) lesions, no EPE  -Path 2/20/2025 Shaun 3+3=6  (2)    -procedure under contemplation        9. Abnormal blood chemistry  -     Renal Function Panel; Future; Expected date: 04/22/2025      See meds, orders, follow up, routing and instructions sections of encounter and AVS. Discussed with patient and provided on AVS.    Surgical Risk Assessment     Active cardiac issues:  Active decompensated heart failure? No   Unstable angina?  No   Significant uncontrolled arrhythmias? No   Severe valvular heart disease-Aortic or Mitral Stenosis? No   Recent MI or coronary revascularization < 30 days? No     Clinical risk factors predicting perioperative major adverse cardiac events per RCRI  High risk surgery (suprainguinal vascular, intraperitoneal, or intrathoracic surgery)? No   History of CAD/ischemic heart disease? No   History of cerebrovascular disease (CVA or TIA)? No   History of compensated heart failure? No   Type 2 diabetes requiring insulin? No   Serum Creatinine > 2? No   Total cardiac risk factors 1     0 predictors = 3.9%, 1 predictor = 6.0%, 2 predictors = 10.1%, >=3 predictors = >15%    According to the revised cardiac risk index, the risk of dedrick-procedural major cardiac complications (cardiac death, nonfatal MI, nonfatal cardiac arrest, postoperative cardiogenic pulmonary edema, complete heart block) is: 3.9 - 6 .     From [place]ppe 2017, based on pooled data from 5 high quality external validations (4 prospective). These numbers are higher than those often quoted from the now-outdated original study (Remi 1999).    If patient has a low risk of MACE (<1%), proceed to surgery. If the patient is at elevated risk of MACE, then determine functional capacity (pt reported activity or DASI model).     If the patient has moderate, good, or excellent functional capacity (>=4 METs), then proceed to surgery without further evaluation. If patient has poor or unknown functional capacity, will further testing impact decision making or perioperative care? If yes, then  pharmacological stress testing is appropriate. In those patients with unknown functional capacity, exercise stress testing may be reasonable to perform.     Patient's functional mets: 4-6+    < 4 METs -unable to walk > 2 blocks on level ground without stopping due to symptoms  - eating, dressing, toileting, walking indoors, light housework. POOR   > 4 METs -climbing > 1 flight of stairs without stopping  -walking up hill > 1-2 blocks  -scrubbing floors  -moving furniture  - golf, bowling, dancing or tennis  -running short distance MODERATE to EXCELLENT     OR   [email or web address]    May have upcoming prostate surgery.  Exercises with no difficulties at lipase at this time.  Overdue for cardiology follow up for valvular heart disease and history of?  Congenital heart surgery.    Continue current blood pressure management.    As this patient's primary care physician, I am actively managing and/or treating his/her chronic medical conditions now and in the future. This includes, but is not limited to, medication management, coordination of care, documentation review from his/her specialists, and labs/imaging review that I have performed to prepare for this visit as well as will do so in the future as part of my care continuity for this patient.

## 2025-03-13 ENCOUNTER — PATIENT MESSAGE (OUTPATIENT)
Dept: HEMATOLOGY/ONCOLOGY | Facility: CLINIC | Age: 70
End: 2025-03-13
Payer: MEDICARE

## 2025-03-14 ENCOUNTER — TELEPHONE (OUTPATIENT)
Dept: UROLOGY | Facility: CLINIC | Age: 70
End: 2025-03-14
Payer: MEDICARE

## 2025-03-31 ENCOUNTER — TELEPHONE (OUTPATIENT)
Dept: UROLOGY | Facility: CLINIC | Age: 70
End: 2025-03-31
Payer: MEDICARE

## 2025-04-02 ENCOUNTER — OFFICE VISIT (OUTPATIENT)
Dept: HEMATOLOGY/ONCOLOGY | Facility: CLINIC | Age: 70
End: 2025-04-02
Payer: MEDICARE

## 2025-04-02 ENCOUNTER — LAB VISIT (OUTPATIENT)
Dept: LAB | Facility: HOSPITAL | Age: 70
End: 2025-04-02
Payer: MEDICARE

## 2025-04-02 VITALS
WEIGHT: 165.38 LBS | RESPIRATION RATE: 16 BRPM | BODY MASS INDEX: 25.07 KG/M2 | HEART RATE: 89 BPM | HEIGHT: 68 IN | OXYGEN SATURATION: 98 % | SYSTOLIC BLOOD PRESSURE: 121 MMHG | TEMPERATURE: 99 F | DIASTOLIC BLOOD PRESSURE: 59 MMHG

## 2025-04-02 DIAGNOSIS — D47.2 SMOLDERING MYELOMA: ICD-10-CM

## 2025-04-02 DIAGNOSIS — D58.0 HEREDITARY SPHEROCYTIC HEMOLYTIC ANEMIA: ICD-10-CM

## 2025-04-02 DIAGNOSIS — D47.Z9 LOW GRADE B CELL LYMPHOPROLIFERATIVE DISORDER: Primary | ICD-10-CM

## 2025-04-02 DIAGNOSIS — D58.0 HEREDITARY SPHEROCYTOSIS: ICD-10-CM

## 2025-04-02 DIAGNOSIS — D47.Z9 LOW GRADE B CELL LYMPHOPROLIFERATIVE DISORDER: ICD-10-CM

## 2025-04-02 LAB
ABSOLUTE EOSINOPHIL (OHS): 0.05 K/UL
ABSOLUTE MONOCYTE (OHS): 0.47 K/UL (ref 0.3–1)
ABSOLUTE NEUTROPHIL COUNT (OHS): 5.09 K/UL (ref 1.8–7.7)
ALBUMIN SERPL BCP-MCNC: 4.2 G/DL (ref 3.5–5.2)
ALP SERPL-CCNC: 56 UNIT/L (ref 40–150)
ALT SERPL W/O P-5'-P-CCNC: 16 UNIT/L (ref 10–44)
ANION GAP (OHS): 8 MMOL/L (ref 8–16)
AST SERPL-CCNC: 17 UNIT/L (ref 11–45)
BASOPHILS # BLD AUTO: 0.04 K/UL
BASOPHILS NFR BLD AUTO: 0.6 %
BILIRUB SERPL-MCNC: 1.3 MG/DL (ref 0.1–1)
BUN SERPL-MCNC: 22 MG/DL (ref 8–23)
CALCIUM SERPL-MCNC: 9 MG/DL (ref 8.7–10.5)
CHLORIDE SERPL-SCNC: 111 MMOL/L (ref 95–110)
CO2 SERPL-SCNC: 22 MMOL/L (ref 23–29)
CREAT SERPL-MCNC: 1.6 MG/DL (ref 0.5–1.4)
ERYTHROCYTE [DISTWIDTH] IN BLOOD BY AUTOMATED COUNT: 13.7 % (ref 11.5–14.5)
GFR SERPLBLD CREATININE-BSD FMLA CKD-EPI: 46 ML/MIN/1.73/M2
GLUCOSE SERPL-MCNC: 80 MG/DL (ref 70–110)
HCT VFR BLD AUTO: 31.7 % (ref 40–54)
HGB BLD-MCNC: 10.9 GM/DL (ref 14–18)
IGA SERPL-MCNC: 54 MG/DL (ref 40–350)
IGG SERPL-MCNC: 2095 MG/DL (ref 650–1600)
IGM SERPL-MCNC: 63 MG/DL (ref 50–300)
IMM GRANULOCYTES # BLD AUTO: 0.04 K/UL (ref 0–0.04)
IMM GRANULOCYTES NFR BLD AUTO: 0.6 % (ref 0–0.5)
LDH SERPL-CCNC: 125 U/L (ref 110–260)
LYMPHOCYTES # BLD AUTO: 1.43 K/UL (ref 1–4.8)
MCH RBC QN AUTO: 32 PG (ref 27–31)
MCHC RBC AUTO-ENTMCNC: 34.4 G/DL (ref 32–36)
MCV RBC AUTO: 93 FL (ref 82–98)
NUCLEATED RBC (/100WBC) (OHS): 0 /100 WBC
PLATELET # BLD AUTO: 171 K/UL (ref 150–450)
PMV BLD AUTO: 10.1 FL (ref 9.2–12.9)
POTASSIUM SERPL-SCNC: 4.7 MMOL/L (ref 3.5–5.1)
PROT SERPL-MCNC: 8 GM/DL (ref 6–8.4)
RBC # BLD AUTO: 3.41 M/UL (ref 4.6–6.2)
RELATIVE EOSINOPHIL (OHS): 0.7 %
RELATIVE LYMPHOCYTE (OHS): 20.1 % (ref 18–48)
RELATIVE MONOCYTE (OHS): 6.6 % (ref 4–15)
RELATIVE NEUTROPHIL (OHS): 71.4 % (ref 38–73)
RETICS/RBC NFR AUTO: 5.8 % (ref 0.4–2)
SODIUM SERPL-SCNC: 141 MMOL/L (ref 136–145)
WBC # BLD AUTO: 7.12 K/UL (ref 3.9–12.7)

## 2025-04-02 PROCEDURE — 99214 OFFICE O/P EST MOD 30 MIN: CPT | Mod: PBBFAC | Performed by: INTERNAL MEDICINE

## 2025-04-02 PROCEDURE — 85025 COMPLETE CBC W/AUTO DIFF WBC: CPT

## 2025-04-02 PROCEDURE — 99999 PR PBB SHADOW E&M-EST. PATIENT-LVL IV: CPT | Mod: PBBFAC,,, | Performed by: INTERNAL MEDICINE

## 2025-04-02 PROCEDURE — 85045 AUTOMATED RETICULOCYTE COUNT: CPT

## 2025-04-02 PROCEDURE — 84165 PROTEIN E-PHORESIS SERUM: CPT

## 2025-04-02 PROCEDURE — 83521 IG LIGHT CHAINS FREE EACH: CPT

## 2025-04-02 PROCEDURE — 84165 PROTEIN E-PHORESIS SERUM: CPT | Mod: ,,, | Performed by: PATHOLOGY

## 2025-04-02 PROCEDURE — 82784 ASSAY IGA/IGD/IGG/IGM EACH: CPT

## 2025-04-02 PROCEDURE — 83615 LACTATE (LD) (LDH) ENZYME: CPT

## 2025-04-02 PROCEDURE — 80053 COMPREHEN METABOLIC PANEL: CPT

## 2025-04-02 PROCEDURE — 99215 OFFICE O/P EST HI 40 MIN: CPT | Mod: S$PBB,,, | Performed by: INTERNAL MEDICINE

## 2025-04-02 PROCEDURE — 36415 COLL VENOUS BLD VENIPUNCTURE: CPT
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
## 2025-04-03 LAB
ALBUMIN, SPE (OHS): 4.6 G/DL (ref 3.35–5.55)
ALPHA 1 GLOB (OHS): 0.28 GM/DL (ref 0.17–0.41)
ALPHA 2 GLOB (OHS): 0.52 GM/DL (ref 0.43–0.99)
BETA GLOB (OHS): 0.52 GM/DL (ref 0.5–1.1)
GAMMA GLOBULIN (OHS): 1.77 GM/DL (ref 0.67–1.58)
KAPPA LC FREE SER-MCNC: 5.48 MG/L (ref 0.26–1.65)
KAPPA LC FREE/LAMBDA FREE SER: 5.37 MG/DL (ref 0.33–1.94)
LAMBDA LC FREE SERPL-MCNC: 0.98 MG/DL (ref 0.57–2.63)
PROT SERPL-MCNC: 7.7 GM/DL (ref 6–8.4)

## 2025-04-04 LAB — PATHOLOGIST REVIEW - SPE (OHS): NORMAL

## 2025-04-07 ENCOUNTER — HOSPITAL ENCOUNTER (OUTPATIENT)
Dept: RADIOLOGY | Facility: HOSPITAL | Age: 70
Discharge: HOME | End: 2025-04-07
Attending: STUDENT IN AN ORGANIZED HEALTH CARE EDUCATION/TRAINING PROGRAM
Payer: MEDICARE

## 2025-04-07 DIAGNOSIS — C61 PROSTATE CANCER: ICD-10-CM

## 2025-04-07 PROCEDURE — 78815 PET IMAGE W/CT SKULL-THIGH: CPT | Mod: TC,PI

## 2025-04-07 PROCEDURE — 78815 PET IMAGE W/CT SKULL-THIGH: CPT | Mod: 26,PS,, | Performed by: STUDENT IN AN ORGANIZED HEALTH CARE EDUCATION/TRAINING PROGRAM

## 2025-04-07 PROCEDURE — A9595 HC PIFLUFOLASTAT F-18, DX, PER 1 MCI: HCPCS | Mod: TB | Performed by: STUDENT IN AN ORGANIZED HEALTH CARE EDUCATION/TRAINING PROGRAM

## 2025-04-07 RX ADMIN — PIFLUFOLASTAT F-18 10.16 MILLICURIE: 80 INJECTION INTRAVENOUS at 02:04

## 2025-04-08 ENCOUNTER — OFFICE VISIT (OUTPATIENT)
Dept: UROLOGY | Facility: CLINIC | Age: 70
End: 2025-04-08
Payer: MEDICARE

## 2025-04-08 VITALS
SYSTOLIC BLOOD PRESSURE: 131 MMHG | BODY MASS INDEX: 25.13 KG/M2 | HEIGHT: 68 IN | WEIGHT: 165.81 LBS | DIASTOLIC BLOOD PRESSURE: 52 MMHG | HEART RATE: 76 BPM

## 2025-04-08 DIAGNOSIS — C61 PROSTATE CANCER: Primary | ICD-10-CM

## 2025-04-08 PROCEDURE — 99213 OFFICE O/P EST LOW 20 MIN: CPT | Mod: PBBFAC | Performed by: STUDENT IN AN ORGANIZED HEALTH CARE EDUCATION/TRAINING PROGRAM

## 2025-04-08 PROCEDURE — 99999 PR PBB SHADOW E&M-EST. PATIENT-LVL III: CPT | Mod: PBBFAC,,, | Performed by: STUDENT IN AN ORGANIZED HEALTH CARE EDUCATION/TRAINING PROGRAM

## 2025-04-08 PROCEDURE — 99215 OFFICE O/P EST HI 40 MIN: CPT | Mod: S$PBB,,, | Performed by: STUDENT IN AN ORGANIZED HEALTH CARE EDUCATION/TRAINING PROGRAM

## 2025-04-08 NOTE — PROGRESS NOTES
[facility]  Urologic Oncology Clinic Note        Date of Service: 4/8/2025    Chief Complaint/Reason for Consultation: Prostate Cancer, Favorable Intermediate Risk    Requesting Provider:   No referring provider defined for this encounter.      History of Present Illness:   Patient 70 y.o. male presents with favorable intermediate prostate cancer to discuss treatment options. Presents with wife.      Blood thinners: None  No Hx of TIA, MI, and CVA   Abdominal surgeries:  Undescended testis, open heart surgery at 7 years old      Here today to discuss prostate biopsy results and recent PSMA PET scan. Plan for definitive treatment of prostate cancer with cryoablation.    Urologic History:     2/4/2025 -- Seen by Letty ARMENTA for elevated PSA who referred for biopsy  2/5/2025 -- PSA 4.1 (from 3.0 year prior)  2/13/2025 -- MRI Prostate showed PV 33 cc with pirads 5 in RM anterior TZ and pirads 4 in RM posterior TZ  2/20/2025 -- Uronav Biopsy: Target 2 RB TZ with 3+4, RA 3+4, GARAY, RML and RAL with 3+3  4/7/2025 -- PSMA PET -- no obvious PET avid metastatic disease      Patient Active Problem List    Diagnosis Date Noted    Coronary artery calcification 08/15/2024    Prostate cancer 02/27/2023    Hyperlipidemia 02/27/2023    Colon polyps 09/27/2022    Smoldering myeloma 07/10/2020    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    Bicuspid aortic valve 03/14/2018    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    Hepatic steatosis 10/27/2017    AK (actinic keratosis) 01/25/2017    History of hepatitis C,s/p successful Rx w/ SVR24 (cure) - 6/2018 10/18/2016    Hypertension, essential 09/23/2015    Hereditary spherocytic hemolytic anemia 09/23/2015    Splenomegaly 09/03/2013        Prescriptions Prior to Admission[1]  Review of patient's allergies indicates:   Allergen Reactions    Benazepril hcl Other (See Comments)     Coughing        Past Medical History:   Diagnosis Date    Allergy     Anemia of unknown etiology  "12/18/2019    -followed in hematology    Asymptomatic cholelithiasis     Basal cell carcinoma 04/2024    right lateral eyebrow-excised by Dr. Owens    Heart murmur     History of hepatitis C,s/p successful Rx w/ SVR24 (cure) - 6/2018 10/18/2016    GT2, tx naive  2014 liver biopsy, grade 1 stage 1 9-2017 Fibroscan = F2   Completed Epclusa 12wks w/ SVR    Hypertension     Tuberculosis     Hx postive      Past Surgical History:   Procedure Laterality Date    CARDIAC SURGERY  01/01/1962    COLONOSCOPY N/A 09/27/2022    Procedure: COLONOSCOPY;  Surgeon: Antonio Russo MD;  Location: [facility] ENDO ([address] [place] [address]);  Service: Endoscopy;  Laterality: N/A;  Not vaccinated. will do home test and notify of results.EC    FRACTURE SURGERY  Jan 2009    Athrodesis    HAND SURGERY  01/01/2008    Left hand    HERNIA REPAIR  01/01/1967    orchiopexy, left  age 12      Family History   Problem Relation Name Age of Onset    Stroke Mother Julieta     Heart disease Father Charles Sahni     Cancer Paternal Aunt Raina Sahni     Cancer Paternal Uncle Karan Sahni     Early death Paternal Cousin Adonis Sahni     Melanoma Neg Hx        Social History     Tobacco Use    Smoking status: Never    Smokeless tobacco: Never   Substance Use Topics    Alcohol use: Yes     Alcohol/week: 20.0 standard drinks of alcohol     Types: 20 Cans of beer per week        Review of Systems   Constitutional:  Negative for activity change and unexpected weight change.   HENT:  Negative for congestion.    Respiratory:  Negative for cough.    Genitourinary:  Negative for hematuria.             OBJECTIVE:     Vitals:    04/08/25 1041   BP: (!) 131/52   BP Location: Right arm   Patient Position: Sitting   Pulse: 76   Weight: 75.2 kg (165 lb 12.6 oz)   Height: 5' 8" (1.727 m)            General Appearance: Alert, cooperative, no distress  Head: Normocephalic  Eyes: Clear conjunctiva  Neck: No obvious LND or JVD  Lungs: Normal chest excursion, no accessory muscle use  Chest: " Regular rate rhythm by palpation, no pedal edema  Abdomen: Soft, non-tender, non-distended,   Male Genitalia: FERNANDEZ neg  Extremities: Atraumatic   Lymph Nodes: No appreciable lymph adenopathy  Neurologic: No gross gait, motor or sensory deficits            LAB:      All laboratory values listed below was/were independently reviewed with patient at this clinic visit.    Lab Results   Component Value Date    PSA 2.5 02/03/2022    PSA 1.4 12/04/2020    PSA 1.4 11/13/2019    PSA 1.1 05/01/2018    PSA 1.5 01/25/2018    PSA 0.98 01/26/2017    PSA 0.89 09/23/2015    PSA 0.69 08/15/2013    PSA 0.61 07/11/2011    PSADIAG 4.1 (H) 02/05/2025    PSADIAG 3.0 08/05/2024    PSADIAG 3.3 03/01/2024    PSADIAG 2.2 02/27/2023    PSADIAG 2.1 03/08/2022   -    IMAGING:      All imaging listed below was/were independently reviewed with patient at this clinic visit.    2/13/2025  MRI PROSTATE W W/O CONTRAST     CLINICAL HISTORY:  Prostate cancer suspected;  Elevated prostate specific antigen (PSA)     Additional history: None provided.     TECHNIQUE:  Multiparametric MRI of the prostate/pelvis performed on a 3T scanner with phase pelvic coil. Multiplanar, multisequence images including high resolution, small field-of-view T2-WI; axial diffusion weighted images with multiple B-values and creation of ADC-maps; and dynamic contrast enhanced T1-weighted images through the prostate were obtained before, during, and after the administration of 10 cc intravenous gadolinium.     COMPARISON:  PET-CT 10/20/2020     FINDINGS:  Previous biopsy: None.     PSA: 4.1 ng/mL 02/05/2025     Prior therapy: None     Prostate: 4.7 x 4.0 x 3.4 cm corresponding to a computed volume of 33.02 cc.     Peripheral zone: No suspicious lesions identified.     Transitional zone:     Lesion (LUZMARIA) #T-1     Location: Side:right; Region: mid; Zone: anterior     Greatest dimension: 1.7 cm     T2-WI: Lenticular or non-circumscribed, homogeneous,moderately hypointense, and  greater than 1.5 cm in greatest dimension.     DWI/ADC: Same as 4 but ?1.5 cm in greatest dimension or definite extraprostateic extension/invasive behavior, score 5.     DCE: Positive     Prostate Margin: Tumor contact with capsule, without evidence of extraprostatic extension     PI-RADS assessment category: 5     Lesion (LUZMARIA) #T-2     Location: Side:right; Region: mid; Zone: posterior     Greatest dimension: 0.8 cm     T2-WI: Lenticular or non-circumscribed, homogeneous,moderately hypointense, and <1.5 cm in greatest dimension, score 4.     DWI/ADC: Focal markedly hypointense on ADC and markedly hyperintense on high b-value DWI; <1.5 cm in greatest dimension, score 4.     DCE: Positive     Prostate Margin: No involvement-clearly confined tumor     PI-RADS assessment category: 4     Neurovascular bundle: Normal     Seminal vesicles: Normal appearance.     Adjacent Organ Involvement: No evidence for urinary bladder or rectal invasion.     Lymphadenopathy: None.     Other Findings: Right rectus femoris intramuscular lipoma.  Small left fat containing inguinal hernia.  Diverticulosis coli.     Impression:     1. Two suspicious lesions are identified in the transitional zone at the mid prostate, as above.  Overall Assessment: 5     Extraprostatic extension: Negative     Number of targets created for potential MR/US fusion biopsy     Peripheral zone: 0     Transition zone: 2     Electronically signed by resident: Chanell Leiws  Date:                                            02/13/2025  Time:                                           10:15     Electronically signed by:Flash Cox MD  Date:                                            02/13/2025  Time:                                           11:11      4/7/2025  NM PET CT F 18 PYL PSMA, MIDTHIGH TO VERTEX     CLINICAL HISTORY:  prostate cancer;  Malignant neoplasm of prostate     TECHNIQUE:  Approximately 60 minutes following the intravenous injection of 10.16 mCi  F-18 piflufolastat, PET images were acquired from the proximal thighs to the skull vertex. CT images were also acquired for attenuation correction and anatomic localization and performed without oral or IV contrast.     COMPARISON:  MRI prostate 02/13/2025.     FINDINGS:  In the head and neck, there is physiologic uptake in the lacrimal and salivary glands, and there are no tracer avid lesions suspicious for malignancy.     In the chest, there are no tracer avid lesions suspicious for malignancy.     In the abdomen and pelvis, there is multifocal increased tracer uptake in the prostate, SUV max 7.2.  No suspicious tracer avid abdominopelvic lymph nodes.     There is physiologic distribution in the liver, spleen, bowel, and genitourinary tract, and there are no tracer avid lesions suspicious for malignancy.     In the bones, there is physiologic uptake in the bone marrow, and there are no tracer avid lesions suspicious for malignancy.     Additional CT findings: Mild calcific aortic and coronary atherosclerosis.  Cholelithiasis.  Colonic diverticulosis.  Small fat containing umbilical hernia.     Impression:     Abnormal tracer uptake in the prostate compatible with prostate cancer.  No tracer avid metastasis.        Electronically signed by:Karan Garcia  Date:                                            04/08/2025  Time:                                           09:02    ASSESSMENT/PLAN:     69 yo male with favorable intermediate prostate cancer    Plan:    Prostate cancer, favorable intermediate risk  We discussed the nature and clinical staging of his prostate cancer.  The treatment options for prostate cancer include but are not limited to expectant management, active surveillance, hormonal therapy (medical or surgical castration), interstitial radioactive seeds (brachytherapy), external beam radiotherapy, cryosurgery, HIFU, chemotherapy or radical prostatectomy.  The risks, benefits, possible complications  and alternatives of each were discussed, compared and contrasted with one another.  Long-term obstructive or irritative urinary problems occur in a subset of patients following observation, active surveillance or radiation.  Temporary proctitis following radiation persists in some patients long-term in a small but significant subset and is rare during observation, active surveillance or after prostatectomy.  Regarding radiation therapy, brachytherapy has similar effects as external beam radiotherapy with regard to erectile dysfunction and proctitis but can also exacerbate urinary obstructive symptoms.  Proton beam therapy offers no clinical advantage over other forms of definitive treatment. Erectile dysfunction occurs in many patients after radical prostatectomy or radiation, and that ejaculation will be lacking despite preserved ability to attain orgasm, whereas observation does not cause such sexual dysfunction, however sexual dysfunction can still decline over time in some men without prostate cancer treatment.  Whole gland cryotherapy is associated with worse sexual side effects and similar urinary side effects as those after radiotherapy. Temporary urinary incontinence occurs in most patients after prostatectomy and persists long-term in a small but significant subset, more than during observation, active surveillance or after radiation.       I told the patient that according to NCCN criteria, he has intermediate risk prostate cancer.  In particular he has favorable intermediate risk disease due to PSA <10 and PGG 2. The clinical implications of this were discussed.  Radical prostatectomy is associated with improved overall survival when compared to watchful waiting or observation strategies. In this risk group, patients are at higher risk for positive surgical margins or incomplete treatment, prostate cancer recurrence, progression, extraprostatic disease and the need for further treatment when compared to a  low risk group.  The standard treatment options for this disease category include surgery, and radiation therapy plus androgen deprivation therapy (ADT). The use of ADT with radiation increases the likelihood and severity of adverse treatment-related events on sexual function in most men and can cause other systemic side effects. For patients with favorable intermediate risk disease, prostate cancer can be treated with radiation alone, but the evidence basis is less robust than for combining radiotherapy with ADT.  Cryotherapy may also be considered as a treatment option for select patients with intermediate risk disease.  The patient understands that he may need multimodal therapy or other salvage therapies after primary treatment for his prostate cancer.  For example, if the patient elected radical prostatectomy, subsequent radiation therapy may be recommended if adverse pathologic features were identified. Additionally, for patients with unfavorable intermediate risk disease, staging studies are recommended, comprising either a MRI or CT and a bone scan.  Thus far the patient has had a mpMRI. The patient had the opportunity to ask questions regarding the implications of intermediate risk prostate cancer and these were answered.            Today I discussed with the patient that based on is his NCCN  favorable intermediate risk  status that a PSMA PET scan is warranted to determine risk of metastatic disease. I informed him that if metastatic disease is identified treatment options would change. Thus we have placed the order for scan.         Patients can become proactively involved with their care via modifiable health-related behaviors and/or risk factors.  Smoking and obesity may adversely impact treatment outcomes and increase the risk of general surgical complications in those men electing therapy for prostate cancer.  Smoking in general is associated with poor erectile function and urinary problems.  Thus,  exercise, a balanced wholesome diet and abstinence from these and other negative modifiable health-related behaviors is recommended.      We then discussed the risks and benefits of radical prostatectomy and pelvic lymphadenectomy. Prostatectomy can relieve preoperative urinary obstructive symptoms.  Pelvic lymph node dissection is recommended for those with unfavorable intermediate risk or high risk disease. We discussed both immediate and long-term side effects of surgery.  The potential risks include but are not limited to bleeding/transfusion, infection, reaction to anesthesia, heart attack, deep venous thrombosis/pulmonary embolus, death or other adverse medical outcome, anastomotic stricture, incontinence, impotence, rectal injury or fistula with need for colostomy/additional surgery, injury to surrounding structures (nerves, vessels, ureter, bowel/bowel obstruction, etc.), failure to completely eradicate the tumor/positive margins or the need for further therapy.  I told him that after surgery, his libido should be unchanged, he may or may not have normal spontaneous erections but that he would likely have a dry orgasm.  I told him that additional procedures may be necessary to address strictures, incontinence, impotence, rectal fistula, drainage of a lymphocele, etc. Younger (<65 years of age) or healthier (>10 year life expectancy) men are more likely to experience cancer control benefits from prostatectomy than older men.  Older men experience higher rates of permanent erectile dysfunction and urinary incontinence after prostatectomy compared to younger men. For men who are found to have locally extensive cancer with prostatectomy, additional radiation therapy may be recommended.  I gave him the option of discussing radiation treatment options with our radiation oncologist or cancer therapies with our genitourinary oncologist.  He had the opportunity to ask questions and his questions were answered to his  satisfaction.  I thought he would be a GOOD candidate for RALP      We also spoke about the usual preoperative care, the bowel prep, the usual course of hospitalization and postoperative care.  If he decides to undergo surgery, most men will need to abstain from NSAIDs and blood thinners usually two weeks prior to surgery but that also depends on the medication and the particular clinical situation.  I told him that if he has any concerns about temporarily discontinuing his anticoagulants, he will need to take the initiative to discuss this with his internist, cardiologist or prescriber of those medications/anticoagulants.       We did encourage the patient to obtain an independent opinion from each of medical oncology and radiation oncology, and did offer to place a referral.      Ultimately, we did inform the patient that selection of a management strategy for localized prostate cancer should incorporate shared decision-making along with cancer severity (NCCN risk category), patient values and preferences, life expectancy [also encouraged to seek advice from internist/primary care], pre-treatment general functional and genitourinary symptoms [documented above], expected post-treatment functional status and potential for salvage treatment.      In addition to traditional management options for prostate cancer we discussed ablative techniques for prostate cancer. We discussed that in this scenario the prostate remains in the body and different energy sources such as high-intensity focused ultrasound (heat) or cryoablation (freeze) are used to destroy/kill prostate tissue and as result treat prostate cancer. The goal is to use a limited amount of energy in a focused approach to treat only the cancer present and not the whole gland limiting overall side-effects associated with whole gland treatment such as urinary incontinence, erectile dysfunction, or trouble voiding.        We talked about ablation using  cryoablation. I told him that with this procedure his rate of metastasis and cancer survival would likely be unchanged in the long-term with likely better preservation of his urinary and sexual function. Lastly, we spoke about the fact that if he would like to proceed with cryoablation that he would need to continue surveillance of his gland by prostate biopsy and prostate MRI in the future, without continued surveillance there is no way to ensure cancer control and provide cure. To this end, the patient understood counseling, was allowed to ask questions which were answered, and committed to surveillance follow up.    He is in agreement with surveillance.     With respect to ablation I discussed potential side-effects. Discussed that the major risks of the operation include damage to the urethra resulting in urethral stricture and damage to the rectum resulting in recto-urethral fistula. Discussed that in the modern era with new technology including better ultrasound active monitoring and urethral warming catheters these adverse major risks are very rare and only seen in 1-3% of cases. Discussed that more frequent and minimal side-effects include short term urgency, frequency, urinary retention requiring fowler, UTI requiring antibiotics, epididymal-orchitis requiring antibiotics, and dysuria. Discussed that also loss of erectile function may be common though generally less when compared with radical prostatectomy    Lastly, we discussed that while ablation of prostate tissue is for curative intent treatment of prostate cancer, there is always a risk of local recurrence which increases with prostate cancer NCCN risk category. Discussed that metastatic recurrence is rare in the short term with close monitoring following treatment.    Patient aware that he will require catheter for 7 days following procedure.    Patient understood all counseling well, in agreement with above, and all questions were answered.     Today  returns with PSMA PET scan showing no obvious metastatic disease   Plan cryoablation prostate  Plan May 26th as potential date.         Chepe Hand MD  Urologic Oncology  P: [phone]             [1] (Not in a hospital admission)

## 2025-04-10 ENCOUNTER — TELEPHONE (OUTPATIENT)
Dept: INTERNAL MEDICINE | Facility: CLINIC | Age: 70
End: 2025-04-10
Payer: MEDICARE

## 2025-04-14 ENCOUNTER — TELEPHONE (OUTPATIENT)
Dept: UROLOGY | Facility: CLINIC | Age: 70
End: 2025-04-14
Payer: MEDICARE

## 2025-04-14 DIAGNOSIS — C61 PROSTATE CANCER: Primary | ICD-10-CM

## 2025-04-14 DIAGNOSIS — Z01.818 PREOPERATIVE CLEARANCE: ICD-10-CM

## 2025-04-14 DIAGNOSIS — Q23.81 BICUSPID AORTIC VALVE: ICD-10-CM

## 2025-04-14 DIAGNOSIS — I10 HYPERTENSION, ESSENTIAL: Primary | ICD-10-CM

## 2025-04-14 NOTE — ANESTHESIA PAT ROS NOTE
04/14/2025  Jose Sahni is a 70 y.o., male.      Pre-op Assessment          Review of Systems           Anesthesia Assessment: Preoperative EQUATION    Planned Procedure: Procedure(s) (LRB):  CRYOABLATION, PROSTATE (N/A)  Requested Anesthesia Type:General  Surgeon: Chepe Hand MD  Service: Urology  Known or anticipated Date of Surgery:5/16/2025    Surgeon notes: reviewed    Electronic QUestionnaire Assessment completed via nurse interview with patient.        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   04/02/2025 CMP, CBC  02/18/2022 CLAUDIA EF 68%    Instructions given. (See in Nurse's note)    Optimization:  Anesthesia Preop Clinic Assessment  Indicated    Medical Opinion Indicated       Sub-specialist consult indicated:   TBD       Plan:    Testing:  EKG, PT/INR, and PTT   Pre-anesthesia  visit       Visit focus: concerns in complex and/or prolonged anesthesia, airway concerns, position other than supine, last airway 2022     Consultation:IM Perioperative Hospitalist, Solomon clear      Patient  has previously scheduled Medical Appointment:    Navigation: Tests Scheduled.              Consults scheduled.             Results will be tracked by Preop Clinic.       04/14/2025 IB request Cards(Dr. Lay) to add Preop cardiac clear visit 05/07/2025.      05/05/2025 Preop Cardiac appt 05/07/025 05/07/2025 Cards Clearance Dr. Lay : Preoperative cardiovascular evaluation   Patient is low risk by RCRI(3% risk of MACE at 30 days) for an intermediate risk surgery.  No further testing is indicated prior to surgery.  I am ordering an echocardiogram as part of yearly surveillance for his bicuspid aortic valve.  This echocardiogram should not delay surgery.

## 2025-04-15 ENCOUNTER — TELEPHONE (OUTPATIENT)
Dept: CARDIOLOGY | Facility: CLINIC | Age: 70
End: 2025-04-15
Payer: MEDICARE

## 2025-04-15 ENCOUNTER — TELEPHONE (OUTPATIENT)
Dept: INTERNAL MEDICINE | Facility: CLINIC | Age: 70
End: 2025-04-15
Payer: MEDICARE

## 2025-04-15 DIAGNOSIS — I10 HYPERTENSION, ESSENTIAL: Primary | ICD-10-CM

## 2025-04-15 NOTE — TELEPHONE ENCOUNTER
----- Message from Pharmacist Ness sent at 4/9/2025  3:51 PM CDT -----  Patient was switched from losartan to olmesartan in February. His SCr has increased to 1.6. Do you think we should be checking for renal artery stenosis to rule that out as an issue? This is not something I can order, so definitely would need your involvement. In the meantime, I will discontinue olmesartan.Thank you,Ness

## 2025-04-15 NOTE — TELEPHONE ENCOUNTER
----- Message from Rodrigue Dhillon sent at 4/14/2025  6:02 PM CDT -----    ----- Message -----  From: Vickie Gonzales RN  Sent: 4/14/2025   3:27 PM CDT  To: Tomas Marie MD; Frank Hughes#    Hello Dr. Marie, The patient mentioned above is scheduled to see you in clinic on 05/07/2025 . Could you please add to this visit a preoperative cardiac clearance exam? The patient is scheduled to undergo a Cryoablation of the Prostate with Dr. Hand.on 05/16/2025, which will performed under General Anesthesia ( 165 minutes). Thank you for your attention to this matter. Thank you, Vickie Gonzales, [facility]

## 2025-04-16 ENCOUNTER — TELEPHONE (OUTPATIENT)
Dept: INTERNAL MEDICINE | Facility: CLINIC | Age: 70
End: 2025-04-16
Payer: MEDICARE

## 2025-04-16 NOTE — TELEPHONE ENCOUNTER
----- Message from [employer] sent at 4/16/2025  9:33 AM CDT -----  Contact: [phone]  Type: Returning a callWho left a message? officeWhen did the practice call? yesterdayDoes patient know what this is regarding: noWould the patient rather a call back or a response via [facility]? callComments:

## 2025-04-22 ENCOUNTER — LAB VISIT (OUTPATIENT)
Dept: LAB | Facility: HOSPITAL | Age: 70
End: 2025-04-22
Attending: FAMILY MEDICINE
Payer: MEDICARE

## 2025-04-22 ENCOUNTER — RESULTS FOLLOW-UP (OUTPATIENT)
Dept: INTERNAL MEDICINE | Facility: CLINIC | Age: 70
End: 2025-04-22

## 2025-04-22 DIAGNOSIS — I10 HYPERTENSION, ESSENTIAL: ICD-10-CM

## 2025-04-22 LAB
CREAT SERPL-MCNC: 1 MG/DL (ref 0.5–1.4)
GFR SERPLBLD CREATININE-BSD FMLA CKD-EPI: >60 ML/MIN/1.73/M2

## 2025-04-22 PROCEDURE — 82565 ASSAY OF CREATININE: CPT

## 2025-04-22 PROCEDURE — 36415 COLL VENOUS BLD VENIPUNCTURE: CPT

## 2025-05-04 NOTE — ASSESSMENT & PLAN NOTE
Recent INR: 1.1  Platelet count: 171  LFTs normal  Alcohol excess  Spleen size or portal hypertension      No evidence of hepatic decompensation

## 2025-05-04 NOTE — H&P (VIEW-ONLY)
Urology ([facility]) H&P  Staff:  Chepe Hand MD    CC: Prostate Adencarcinoma    HPI:    Prostate Cancer, Favorable Intermediate Risk     Requesting Provider:   No referring provider defined for this encounter.        History of Present Illness:   Patient 70 y.o. male presents with favorable intermediate prostate cancer to discuss treatment options.     Blood thinners: None  No Hx of TIA, MI, and CVA   Surgeries: Undescended testis, open heart surgery at 7 years old      Here today to discuss prostate biopsy results and recent PSMA PET scan. Plan for definitive treatment of prostate cancer with cryoablation.     Urologic History:      2/4/2025 -- Seen by Letty ARMENTA for elevated PSA who referred for biopsy  2/5/2025 -- PSA 4.1 (from 3.0 year prior)  2/13/2025 -- MRI Prostate showed PV 33 cc with pirads 5 in RM anterior TZ and pirads 4 in RM posterior TZ  2/20/2025 -- [facility]ronav Biopsy: Target 2 RB TZ with 3+4, RA 3+4, GARAY, RML and RAL with 3+3  4/7/2025 -- PSMA PET -- no obvious PET avid metastatic disease    Voiding complaints: present, +urgency, +nocturia  ED: present    ROS:  Neg except per HPI, specifically no bone pain, no unintentional weight loss, no anorexia, no night sweats    Past Medical History:   Diagnosis Date    Allergy     Anemia of unknown etiology 12/18/2019    -followed in hematology    Asymptomatic cholelithiasis     Basal cell carcinoma 04/2024    right lateral eyebrow-excised by Dr. Owens    Heart murmur     History of hepatitis C,s/p successful Rx w/ SVR24 (cure) - 6/2018 10/18/2016    GT2, tx naive  2014 liver biopsy, grade 1 stage 1 9-2017 Fibroscan = F2   Completed Epclusa 12wks w/ SVR    Hypertension     Tuberculosis     Hx postive- + PPD; states no meds       Past Surgical History:   Procedure Laterality Date    CARDIAC SURGERY  01/01/1962    COLONOSCOPY N/A 09/27/2022    Procedure: COLONOSCOPY;  Surgeon: Antonio Russo MD;  Location: [facility] ([address] [place] [address]);  Service: Endoscopy;  Laterality:  N/A;  Not vaccinated. will do home test and notify of results.EC    FRACTURE SURGERY  Jan 2009    Athrodesis    HAND SURGERY  01/01/2008    Left hand    HERNIA REPAIR  01/01/1967    MANDIBLE FRACTURE SURGERY  1962    orchiopexy, left  age 12       Social History     Socioeconomic History    Marital status:    Tobacco Use    Smoking status: Never    Smokeless tobacco: Never   Substance and Sexual Activity    Alcohol use: Yes     Alcohol/week: 20.0 standard drinks of alcohol     Types: 20 Cans of beer per week     Comment: occasional    Drug use: No    Sexual activity: Not Currently     Partners: Female     Comment:  20 years     Social Drivers of Health     Financial Resource Strain: Low Risk  (3/30/2025)    Overall Financial Resource Strain (CARDIA)     Difficulty of Paying Living Expenses: Not hard at all   Food Insecurity: No Food Insecurity (3/30/2025)    Hunger Vital Sign     Worried About Running Out of Food in the Last Year: Never true     Ran Out of Food in the Last Year: Never true   Transportation Needs: No Transportation Needs (3/30/2025)    PRAPARE - Transportation     Lack of Transportation (Medical): No     Lack of Transportation (Non-Medical): No   Physical Activity: Insufficiently Active (3/30/2025)    Exercise Vital Sign     Days of Exercise per Week: 3 days     Minutes of Exercise per Session: 40 min   Stress: No Stress Concern Present (3/30/2025)    [employer] - Occupational Stress Questionnaire     Feeling of Stress : Not at all   Housing Stability: Low Risk  (3/30/2025)    Housing Stability Vital Sign     Unable to Pay for Housing in the Last Year: No     Number of Times Moved in the Last Year: 0     Homeless in the Last Year: No       Family History   Problem Relation Name Age of Onset    Stroke Mother Julieta     Heart disease Father Charles Sahni     Cancer Paternal Aunt Raina Sahni     Cancer Paternal Uncle Karan Sahni     Early death Paternal  "Cousin Adonis Sahni     Melanoma Neg Hx         Review of patient's allergies indicates:   Allergen Reactions    Benazepril hcl Other (See Comments)     Coughing       Medications Ordered Prior to Encounter[1]    Anticoagulation:  No     Physical Exam:    Estimated body mass index is 25.22 kg/m² as calculated from the following:    Height as of an earlier encounter on 25: 5' 8" (1.727 m).    Weight as of an earlier encounter on 25: 75.3 kg (165 lb 14.3 oz).    General: No acute distress, well developed. AAOx3  Head: Normocephalic, Atraumatic  Eyes: Extra-occular movements intact, No discharge  Neck: supple, symmetrical, trachea midline  Lungs: normal respiratory effort, no respiratory distress, no wheezes  CV: regular rate, 2+ pulses  Abdomen: soft, non-tender, non-distended, no organomegaly  MSK: no edema, no deformities, normal ROM  Skin: skin color, texture, turgor normal.  Neurologic: no focal deficits, sensation intact    Labs:  Urine dipstick today shows negative for all components.    Lab Results   Component Value Date    WBC 7.12 2025    HGB 10.9 (L) 2025    HCT 31.7 (L) 2025    MCV 93 2025     2025       BMP  Lab Results   Component Value Date     2025    K 4.7 2025     (H) 2025    CO2 22 (L) 2025    BUN 22 2025    CREATININE 1.0 2025    CALCIUM 9.0 2025    ANIONGAP 8 2025    EGFRNORACEVR >60 2025       Lab Results   Component Value Date    PSA 3.32 2025    PSA 2.5 2022    PSA 1.4 2020    PSA 1.4 2019    PSA 1.1 2018    PSA 1.5 2018    PSA 0.98 2017    PSA 0.89 2015    PSA 0.69 08/15/2013    PSA 0.61 2011    PSADIAG 4.1 (H) 2025    PSADIAG 3.0 2024    PSADIAG 3.3 2024    PSADIAG 2.2 2023    PSADIAG 2.1 2022        Imagin2025  MRI PROSTATE W W/O CONTRAST     CLINICAL HISTORY:  Prostate cancer suspected;  " Elevated prostate specific antigen (PSA)     Additional history: None provided.     TECHNIQUE:  Multiparametric MRI of the prostate/pelvis performed on a 3T scanner with phase pelvic coil. Multiplanar, multisequence images including high resolution, small field-of-view T2-WI; axial diffusion weighted images with multiple B-values and creation of ADC-maps; and dynamic contrast enhanced T1-weighted images through the prostate were obtained before, during, and after the administration of 10 cc intravenous gadolinium.     COMPARISON:  PET-CT 10/20/2020     FINDINGS:  Previous biopsy: None.     PSA: 4.1 ng/mL 02/05/2025     Prior therapy: None     Prostate: 4.7 x 4.0 x 3.4 cm corresponding to a computed volume of 33.02 cc.     Peripheral zone: No suspicious lesions identified.     Transitional zone:     Lesion (LUZMARIA) #T-1     Location: Side:right; Region: mid; Zone: anterior     Greatest dimension: 1.7 cm     T2-WI: Lenticular or non-circumscribed, homogeneous,moderately hypointense, and greater than 1.5 cm in greatest dimension.     DWI/ADC: Same as 4 but ?1.5 cm in greatest dimension or definite extraprostateic extension/invasive behavior, score 5.     DCE: Positive     Prostate Margin: Tumor contact with capsule, without evidence of extraprostatic extension     PI-RADS assessment category: 5     Lesion (LUZMARIA) #T-2     Location: Side:right; Region: mid; Zone: posterior     Greatest dimension: 0.8 cm     T2-WI: Lenticular or non-circumscribed, homogeneous,moderately hypointense, and <1.5 cm in greatest dimension, score 4.     DWI/ADC: Focal markedly hypointense on ADC and markedly hyperintense on high b-value DWI; <1.5 cm in greatest dimension, score 4.     DCE: Positive     Prostate Margin: No involvement-clearly confined tumor     PI-RADS assessment category: 4     Neurovascular bundle: Normal     Seminal vesicles: Normal appearance.     Adjacent Organ Involvement: No evidence for urinary bladder or rectal invasion.      Lymphadenopathy: None.     Other Findings: Right rectus femoris intramuscular lipoma.  Small left fat containing inguinal hernia.  Diverticulosis coli.     Impression:     1. Two suspicious lesions are identified in the transitional zone at the mid prostate, as above.  Overall Assessment: 5     Extraprostatic extension: Negative     Number of targets created for potential MR/US fusion biopsy     Peripheral zone: 0     Transition zone: 2     Electronically signed by resident: Chanell Lewis  Date:                                            02/13/2025  Time:                                           10:15     Electronically signed by:Flash Cox MD  Date:                                            02/13/2025  Time:                                           11:11        4/7/2025  NM PET CT F 18 PYL PSMA, MIDTHIGH TO VERTEX     CLINICAL HISTORY:  prostate cancer;  Malignant neoplasm of prostate     TECHNIQUE:  Approximately 60 minutes following the intravenous injection of 10.16 mCi F-18 piflufolastat, PET images were acquired from the proximal thighs to the skull vertex. CT images were also acquired for attenuation correction and anatomic localization and performed without oral or IV contrast.     COMPARISON:  MRI prostate 02/13/2025.     FINDINGS:  In the head and neck, there is physiologic uptake in the lacrimal and salivary glands, and there are no tracer avid lesions suspicious for malignancy.     In the chest, there are no tracer avid lesions suspicious for malignancy.     In the abdomen and pelvis, there is multifocal increased tracer uptake in the prostate, SUV max 7.2.  No suspicious tracer avid abdominopelvic lymph nodes.     There is physiologic distribution in the liver, spleen, bowel, and genitourinary tract, and there are no tracer avid lesions suspicious for malignancy.     In the bones, there is physiologic uptake in the bone marrow, and there are no tracer avid lesions suspicious for malignancy.      Additional CT findings: Mild calcific aortic and coronary atherosclerosis.  Cholelithiasis.  Colonic diverticulosis.  Small fat containing umbilical hernia.     Impression:     Abnormal tracer uptake in the prostate compatible with prostate cancer.  No tracer avid metastasis.        Electronically signed by:Karan Garcia  Date:                                            04/08/2025  Time:                                           09:02    Assessment: Jose Sahni is a 70 y.o. male with favorable intermediate risk prostate cancer.    Plan:   1. To OR for Cryoablation of the prostate on 5/16/2025.   2. The risks and benefits of cryoablation were discussed in depth, including preoperative preparation, procedural technique (including cryoprobe placement and freeze-thaw cycles), postoperative recuperation and recovery, and short- and long-term complications. I discussed the risks of bleeding, infection, urinary retention, incontinence, and erectile dysfunction. I also discussed the risk of urethral or rectal injury, fistula formation, and the possibility of incomplete cancer control requiring future treatment. The patient voices understanding and all questions have been answered. The patient agrees to proceed as planned. Consents signed.   3. Urine for culture   4. Pre-operative PSA completed  5. Pre-operative clearance on 5/5. Will follow up.   6. Cardiac clearance for history of bicuspid aortic valve on 5/7. Will follow up.      Mana Darby MD           [1]   Current Outpatient Medications on File Prior to Visit   Medication Sig Dispense Refill    amLODIPine (NORVASC) 5 MG tablet Take 1 tablet (5 mg total) by mouth once daily. 90 tablet 3    ascorbic acid, vitamin C, (VITAMIN C) 1000 MG tablet Take 1,000 mg by mouth once daily.      betamethasone dipropionate 0.05 % cream AAA ears bid prn 60 g 3    folic acid (FOLVITE) 1 MG tablet Take 1 mg by mouth once daily.      pravastatin (PRAVACHOL) 20 MG tablet Take 1 tablet  (20 mg total) by mouth once daily. 90 tablet 3    triamcinolone acetonide 0.025% (KENALOG) 0.025 % cream AAA gluteal cleft bid prn flare 30 g 3    [DISCONTINUED] sildenafil (VIAGRA) 100 MG tablet Take 1 tablet (100 mg total) by mouth daily as needed for Erectile Dysfunction. 30 tablet 2     No current facility-administered medications on file prior to visit.

## 2025-05-04 NOTE — PROGRESS NOTES
[facility]  Progress Note    Patient Name: Jose Sahni  MRN: [id number]  Date of Evaluation- 05/06/2025  PCP- Doug Cordero MD    Future cases for Jose Sahni [[id number]]       Case ID Status Date Time Bob Procedure Provider Location    [id number] [facility] 5/16/2025  9:45  CRYOABLATION, PROSTATE Chepe Hand MD [[id number]] [facility] OR 1ST FLR            HPI:  This is a 70 y.o. male  who presents today for a preoperative evaluation in preparation for prostate cryoablation.  Surgery is indicated for prostate cancer.   Patient is new to me.  The history has been obtained by speaking with the patient and reviewing the electronic medical record and/or outside health information. Significant health conditions for the perioperative period are discussed below in assessment and plan.   Patient reports current health status to be Good.  Denies any new symptoms before surgery.       Subjective/ Objective:     Chief Complaint: Preoperative evaulation, perioperative medical management, and complication reduction plan.     Functional Capacity: walks dog once a day for 2 miles without CP/SOB.      Anesthesia issues: None    Difficulty mouth opening: No    Steroid use in the last 12 months:  No    Dental Issues: None    Family anesthesia difficulty: None     Family Hx of Thrombosis: None        Past Medical History:   Diagnosis Date    Allergy     Anemia of unknown etiology 12/18/2019    -followed in hematology    Asymptomatic cholelithiasis     Basal cell carcinoma 04/2024    right lateral eyebrow-excised by Dr. Owens    Heart murmur     History of hepatitis C,s/p successful Rx w/ SVR24 (cure) - 6/2018 10/18/2016    GT2, tx naive  2014 liver biopsy, grade 1 stage 1 9-2017 Fibroscan = F2   Completed Epclusa 12wks w/ SVR    Hypertension     Tuberculosis     Hx postive- + PPD; states no meds         Past Medical History Pertinent Negatives:   Diagnosis Date Noted    Asthma 05/05/2025    COPD (chronic obstructive  "pulmonary disease) 05/05/2025    Deep vein thrombosis 05/05/2025    Depression 05/05/2025    Diabetes mellitus, type 2 05/05/2025    Disorder of kidney and ureter 05/05/2025    Myocardial infarction 05/05/2025    Pulmonary embolism 05/05/2025    Seizures 05/05/2025    Stroke 05/05/2025         Past Surgical History:   Procedure Laterality Date    CARDIAC SURGERY  01/01/1962    COLONOSCOPY N/A 09/27/2022    Procedure: COLONOSCOPY;  Surgeon: Antonio Russo MD;  Location: [facility] ([address] [place] [address]);  Service: Endoscopy;  Laterality: N/A;  Not vaccinated. will do home test and notify of results.EC    FRACTURE SURGERY  Jan 2009    Athrodesis    HAND SURGERY  01/01/2008    Left hand    HERNIA REPAIR  01/01/1967    MANDIBLE FRACTURE SURGERY  1962    orchiopexy, left  age 12       Review of Systems   Constitutional:  Negative for chills, fatigue, fever and unexpected weight change.   HENT:  Negative for congestion, hearing loss, rhinorrhea, sore throat, tinnitus and trouble swallowing.    Eyes:  Negative for visual disturbance.   Respiratory:  Negative for cough, chest tightness, shortness of breath and wheezing.    Cardiovascular:  Negative for chest pain, palpitations and leg swelling.   Gastrointestinal:  Negative for constipation and diarrhea.   Genitourinary:  Negative for decreased urine volume, difficulty urinating, dysuria, frequency, hematuria and urgency.        Nocturia; incomplete bladder emptying    Musculoskeletal:  Negative for arthralgias, back pain, neck pain and neck stiffness.   Neurological:  Negative for dizziness, syncope, weakness, numbness and headaches.   Hematological:  Bruises/bleeds easily.   Psychiatric/Behavioral:  Negative for sleep disturbance and suicidal ideas.               VITALS  Visit Vitals  BP (!) 159/68 (BP Location: Right arm, Patient Position: Sitting)   Pulse 75   Temp 98.3 °F (36.8 °C) (Oral)   Ht 5' 8" (1.727 m)   Wt 75.3 kg (165 lb 14.3 oz)   SpO2 100%   BMI 25.22 kg/m²    "       Physical Exam  Vitals reviewed.   Constitutional:       General: He is not in acute distress.     Appearance: He is well-developed.   HENT:      Head: Normocephalic.      Nose: Nose normal.      Mouth/Throat:      Pharynx: No oropharyngeal exudate.   Eyes:      General:         Right eye: No discharge.         Left eye: No discharge.      Conjunctiva/sclera: Conjunctivae normal.      Pupils: Pupils are equal, round, and reactive to light.   Neck:      Thyroid: No thyromegaly.      Vascular: No carotid bruit or JVD.      Trachea: No tracheal deviation.   Cardiovascular:      Rate and Rhythm: Normal rate and regular rhythm.      Pulses:           Carotid pulses are 2+ on the right side and 2+ on the left side.       Dorsalis pedis pulses are 2+ on the right side and 2+ on the left side.        Posterior tibial pulses are 2+ on the right side and 2+ on the left side.      Heart sounds: Normal heart sounds. No murmur heard.  Pulmonary:      Effort: Pulmonary effort is normal. No respiratory distress.      Breath sounds: Normal breath sounds. No stridor. No wheezing, rhonchi or rales.   Abdominal:      General: Bowel sounds are normal. There is no distension.      Palpations: Abdomen is soft.      Tenderness: There is no abdominal tenderness. There is no guarding.   Musculoskeletal:      Cervical back: Normal range of motion. No pain with movement.      Right lower leg: No edema.      Left lower leg: No edema.   Lymphadenopathy:      Cervical: No cervical adenopathy.   Skin:     General: Skin is warm and dry.      Capillary Refill: Capillary refill takes 2 to 3 seconds.      Findings: No erythema or rash.   Neurological:      Mental Status: He is alert and oriented to person, place, and time.   Psychiatric:         Behavior: Behavior normal. Behavior is cooperative.          Significant Labs:  Lab Results   Component Value Date    WBC 7.12 04/02/2025    HGB 10.9 (L) 04/02/2025    HCT 31.7 (L) 04/02/2025      04/02/2025    CHOL 107 (L) 03/01/2024    TRIG 108 03/01/2024    HDL 27 (L) 03/01/2024    ALT 16 04/02/2025    AST 17 04/02/2025     04/02/2025    K 4.7 04/02/2025     (H) 04/02/2025    CREATININE 1.0 04/22/2025    BUN 22 04/02/2025    CO2 22 (L) 04/02/2025    TSH 2.150 03/01/2024    PSA 3.32 05/05/2025    INR 1.1 05/05/2025    HGBA1C 4.0 03/01/2024           EKG:   Normal sinus rhythm   Normal ECG   When compared with ECG of 19-Feb-2018 08:41,   No significant change was found   Confirmed by Tomas Marie ([id number]) on 5/5/2025 10:36:37 AM         2D ECHO:  TTE:  Results for orders placed or performed during the hospital encounter of 02/18/22   Echo   Result Value Ref Range    Ascending aorta 2.59 cm    STJ 3.06 cm    AV mean gradient 3 mmHg    Ao peak rey 1.01 m/s    Ao VTI 20.32 cm    IVRT 102.76 msec    IVS 1.08 0.6 - 1.1 cm    LA size 3.47 cm    Left Atrium Major Axis 5.83 cm    Left Atrium Minor Axis 5.65 cm    LVIDd 4.35 3.5 - 6.0 cm    LVIDs 2.86 2.1 - 4.0 cm    LVOT diameter 2.63 cm    LVOT peak VTI 13.58 cm    PW 0.98 0.6 - 1.1 cm    MV Peak A Rey 0.72 m/s    E wave deceleration time 168.54 msec    MV Peak E Rey 0.68 m/s    RA Major Axis 4.86 cm    RA Width 2.93 cm    Sinus 3.59 cm    TAPSE 2.08 cm    TDI LATERAL 0.10 m/s    TDI SEPTAL 0.09 m/s    LA WIDTH 4.26 cm    MV stenosis pressure 1/2 time 48.88 ms    LV Diastolic Volume 85.39 mL    LV Systolic Volume 31.10 mL    LVOT peak rey 0.73 m/s    LA Vol (MOD) 61.86 cm3    RV S' 14.85 cm/s    LV LATERAL E/E' RATIO 6.80 m/s    LV SEPTAL E/E' RATIO 7.56 m/s    FS 34 %    LA Vol 72.10 cm3    LV mass 151.24 g    Left Ventricle Relative Wall Thickness 0.45 cm    AV valve area 3.63 cm2    AV Velocity Ratio 0.72     AV index (prosthetic) 0.67     MV valve area p 1/2 method 4.50 cm2    E/A ratio 0.94     Mean e' 0.10 m/s    LVOT area 5.4 cm2    LVOT stroke volume 73.74 cm3    AV peak gradient 4 mmHg    E/E' ratio 7.16 m/s    BSA 1.9 m2    LV Systolic  Volume Index 16.5 mL/m2    LV Diastolic Volume Index 45.18 mL/m2    VIBHA 38.2 mL/m2    LV Mass Index 80 g/m2    VIBHA (MOD) 32.7 mL/m2    Right Atrial Pressure (from IVC) 3 mmHg    EF 68 %    Narrative    · The left ventricle is normal in size with concentric remodeling and   normal systolic function.  · The estimated ejection fraction is 68%.  · Normal left ventricular diastolic function.  · Mild left atrial enlargement.  · Normal right ventricular size with normal right ventricular systolic   function.  · Mild right atrial enlargement.  · Mild to moderate pulmonic regurgitation.  · Normal central venous pressure (3 mmHg).  · Trivial pericardial effusion. Under the RA.              Active Cardiac Conditions: None        Revised Cardiac Risk Index   High -Risk Surgery  Intraperitoneal; Intrathoracic; suprainguinal vascular Yes- + 1 No- 0   History of Ischemic Heart Disease   (Hx of MI/positive exercise test/current chest pain due to ischemia/use of nitrate therapy/EKG with pathological Q waves) Yes- + 1 No- 0   History of CHF  (Pulmonary edema/bilateral rales or S3 gallop/PND/CXR showing pulmonary vascular redistribution) Yes- + 1 No- 0   History of CVA   (Prior stroke or TIA) Yes- + 1 No- 0   Pre-operative treatment with insulin Yes- + 1 No- 0   Pre-operative creatinine > 2mg/dl Yes- + 1 No- 0   Total: 0      Risk Status:  Estimated risk of cardiac complications after non-cardiac surgery using the Revised Cardiac Risk Index for Preoperative risk is 3.9 %      ARISCAT (Canet) risk index: Low: 1.6% risk of post-op pulmonary complications; Intermediate: 13.3% risk of post-op pulmonary complications if surgery is > 3 hours.     American Society of Anesthesiologists Physical Status classification (ASA): 3                            Assessment/Plan:     Prostate cancer  Scheduled with Dr. Hand on 5/16/25 for prostate cryoablation.    Hypertension, essential  Current BP  not at goal today; 159/68.    Taking:  amlodipine  Followed per Digital Medicine; current 30 day average: 140/69  Suggest continued follow-up with PCP      Lifestyle changes to reduce systolic BP:  exercise 30 minutes per day,  5 days per week or 150 minutes weekly; sodium reduction and avoidance of high salt foods such as processed meats, frozen meals and  fast foods.   Keeping a healthy weight/BMI can help with better BP control    BP acceptable for surgery. I recommend monitoring BP during perioperative period as uncontrolled pain can elevate blood pressure.         Congenital heart anomaly  S/P corrective surgery at age 7  Followed per Cardiology; optimization pending 5/7/25  Denies: CP/PND/orthopnea/SOB/palpitations/Syncope/edema    Bicuspid aortic valve  Followed per Cardiology; stable.    Hyperlipidemia  Recommend  healthy diet (DASH/Mediterranean) and exercise.   Patient should exercise 30 minutes at least five times weekly once recovered from surgery. Limit alcohol.  Treated with: pravastatin        Coronary artery calcification  On statin; stable.    Smoldering myeloma  Followed per Hematology; last seen 4/2/25  Closely monitoring    Hereditary spherocytic hemolytic anemia  Followed per Hem-Onc; stable.    History of hepatitis C,s/p successful Rx w/ SVR24 (cure) - 6/2018  Treated with Epclusa; no longer followed per Hepatology.    Hepatic steatosis  Recent INR: 1.1  Platelet count: 171  LFTs normal  Alcohol excess  Spleen size or portal hypertension      No evidence of hepatic decompensation      Colon polyps  S/P Colonoscopy 9/27/22  1. Cecal polyp (1 mm), biopsy:  Tubular adenoma   2. Transverse colon polyps x3, biopsy:  Fragments of tubular adenoma   3. Descending colon polyp (2 mm), biopsy:  Tubular adenoma   4. Rectosigmoid colon polyp, biopsy:  Tubulovillous adenoma    Normocytic anemia  Current labs:  Hgb- 10.9     Hct-   31.7   Recommend monitoring during perioperative period.       Discussion/Management of Perioperative  Care    Thromboembolic prophylaxis (VTE) Care: Risk factors for thrombosis include: age and surgical procedure.  I recommend prophylaxis of thromboembolism with the use of compression stockings/pneumatic devices, and/or pharmacologic agents. The benefits should outweigh the risks for pharmacologic prophylaxis in the perioperative period. I also encourage early ambulation if not contraindicated during the post-operative period.    Risk factors for post-operative pulmonary complications include:age > 65 years and HTN. To reduce the risk of pulmonary complications, prophylactic recommendations include: incentive spirometry use/deep breathing, end tidal carbon dioxide monitoring, and pain control.    Risk factors for renal complications include: age and HTN. To reduce the risk of postoperative renal complications, I recommend the patient maintain adequate hydration.  Avoid/reduce NSAIDS and ZHANG-2 inhibitors use as well as IV contrast for renal protection.    I recommend the use of appropriate prophylactic antibiotics to reduce the risk of surgical site infections.    Delirium risk factors include advanced age. I recommend to avoid/reduce use of benzodiazepine use (not for patients who take on a regular basis), anticholinergics, Benadryl,  and agents that may cause postoperative serotonin syndrome.  Controlled pain can decrease the risk for postop delirium and since opioids are used for postoperative pain control, I suggest using the lowest dose for the shortest amount of time necessary for pain management.     The patient is at an increased risk for urinary retention due to : urological procedure and advanced age. I recommend to avoid/decrease the use of benzodiazepines, anticholinergics, and Benadryl in the perioperative period. I also recommend using opioids for the shortest period of time if possible.          This visit was focused on Preoperative evaluation, Perioperative Medical management, complication reduction  plans. I suggest that the patient follows up with primary care or relevant sub specialists for ongoing health care.    I appreciate the opportunity to be involved in this patients care. Please feel free to contact me if there were any questions about this consultation.        I spent a total of 30 minutes on the day of the visit.  This includes face to face time and non-face to face time preparing to see the patient (e.g., review of tests), obtaining and/or reviewing separately obtained history, documenting clinical information in the electronic or other health record, independently interpreting results and communicating results to the patient/family/caregiver, or care coordinator.       Patient is optimized for surgery.  Optimized per Cardiology (Frank) 5/7/25      Bianca Damian NP  Perioperative Medicine  [facility]

## 2025-05-04 NOTE — ASSESSMENT & PLAN NOTE
Current BP  not at goal today; 159/68.    Taking: amlodipine  Followed per Digital Medicine; current 30 day average: 140/69  Suggest continued follow-up with PCP      Lifestyle changes to reduce systolic BP:  exercise 30 minutes per day,  5 days per week or 150 minutes weekly; sodium reduction and avoidance of high salt foods such as processed meats, frozen meals and  fast foods.   Keeping a healthy weight/BMI can help with better BP control    BP acceptable for surgery. I recommend monitoring BP during perioperative period as uncontrolled pain can elevate blood pressure.

## 2025-05-04 NOTE — PROGRESS NOTES
Urology ([facility]) H&P  Staff:  Chepe Hand MD    CC: Prostate Adencarcinoma    HPI:    Prostate Cancer, Favorable Intermediate Risk     Requesting Provider:   No referring provider defined for this encounter.        History of Present Illness:   Patient 70 y.o. male presents with favorable intermediate prostate cancer to discuss treatment options.     Blood thinners: None  No Hx of TIA, MI, and CVA   Surgeries: Undescended testis, open heart surgery at 7 years old      Here today to discuss prostate biopsy results and recent PSMA PET scan. Plan for definitive treatment of prostate cancer with cryoablation.     Urologic History:      2/4/2025 -- Seen by Letty ARMENTA for elevated PSA who referred for biopsy  2/5/2025 -- PSA 4.1 (from 3.0 year prior)  2/13/2025 -- MRI Prostate showed PV 33 cc with pirads 5 in RM anterior TZ and pirads 4 in RM posterior TZ  2/20/2025 -- [facility]av Biopsy: Target 2 RB TZ with 3+4, RA 3+4, GARAY, RML and RAL with 3+3  4/7/2025 -- PSMA PET -- no obvious PET avid metastatic disease    Voiding complaints: present, +urgency, +nocturia  ED: present    ROS:  Neg except per HPI, specifically no bone pain, no unintentional weight loss, no anorexia, no night sweats    Past Medical History:   Diagnosis Date    Allergy     Anemia of unknown etiology 12/18/2019    -followed in hematology    Asymptomatic cholelithiasis     Basal cell carcinoma 04/2024    right lateral eyebrow-excised by Dr. Owens    Heart murmur     History of hepatitis C,s/p successful Rx w/ SVR24 (cure) - 6/2018 10/18/2016    GT2, tx naive  2014 liver biopsy, grade 1 stage 1 9-2017 Fibroscan = F2   Completed Epclusa 12wks w/ SVR    Hypertension     Tuberculosis     Hx postive- + PPD; states no meds       Past Surgical History:   Procedure Laterality Date    CARDIAC SURGERY  01/01/1962    COLONOSCOPY N/A 09/27/2022    Procedure: COLONOSCOPY;  Surgeon: Antonio Russo MD;  Location: [facility] ([address] [place] [address]);  Service: Endoscopy;  Laterality:  N/A;  Not vaccinated. will do home test and notify of results.EC    FRACTURE SURGERY  Jan 2009    Athrodesis    HAND SURGERY  01/01/2008    Left hand    HERNIA REPAIR  01/01/1967    MANDIBLE FRACTURE SURGERY  1962    orchiopexy, left  age 12       Social History     Socioeconomic History    Marital status:    Tobacco Use    Smoking status: Never    Smokeless tobacco: Never   Substance and Sexual Activity    Alcohol use: Yes     Alcohol/week: 20.0 standard drinks of alcohol     Types: 20 Cans of beer per week     Comment: occasional    Drug use: No    Sexual activity: Not Currently     Partners: Female     Comment:  20 years     Social Drivers of Health     Financial Resource Strain: Low Risk  (3/30/2025)    Overall Financial Resource Strain (CARDIA)     Difficulty of Paying Living Expenses: Not hard at all   Food Insecurity: No Food Insecurity (3/30/2025)    Hunger Vital Sign     Worried About Running Out of Food in the Last Year: Never true     Ran Out of Food in the Last Year: Never true   Transportation Needs: No Transportation Needs (3/30/2025)    PRAPARE - Transportation     Lack of Transportation (Medical): No     Lack of Transportation (Non-Medical): No   Physical Activity: Insufficiently Active (3/30/2025)    Exercise Vital Sign     Days of Exercise per Week: 3 days     Minutes of Exercise per Session: 40 min   Stress: No Stress Concern Present (3/30/2025)    [employer] [place] [employer] - Occupational Stress Questionnaire     Feeling of Stress : Not at all   Housing Stability: Low Risk  (3/30/2025)    Housing Stability Vital Sign     Unable to Pay for Housing in the Last Year: No     Number of Times Moved in the Last Year: 0     Homeless in the Last Year: No       Family History   Problem Relation Name Age of Onset    Stroke Mother Juileta     Heart disease Father Charles Sahni     Cancer Paternal Aunt Raina Sahni     Cancer Paternal Uncle Karan Sahni     Early death Paternal  "Cousin Adonis Sahni     Melanoma Neg Hx         Review of patient's allergies indicates:   Allergen Reactions    Benazepril hcl Other (See Comments)     Coughing       Medications Ordered Prior to Encounter[1]    Anticoagulation:  No     Physical Exam:    Estimated body mass index is 25.22 kg/m² as calculated from the following:    Height as of an earlier encounter on 25: 5' 8" (1.727 m).    Weight as of an earlier encounter on 25: 75.3 kg (165 lb 14.3 oz).    General: No acute distress, well developed. AAOx3  Head: Normocephalic, Atraumatic  Eyes: Extra-occular movements intact, No discharge  Neck: supple, symmetrical, trachea midline  Lungs: normal respiratory effort, no respiratory distress, no wheezes  CV: regular rate, 2+ pulses  Abdomen: soft, non-tender, non-distended, no organomegaly  MSK: no edema, no deformities, normal ROM  Skin: skin color, texture, turgor normal.  Neurologic: no focal deficits, sensation intact    Labs:  Urine dipstick today shows negative for all components.    Lab Results   Component Value Date    WBC 7.12 2025    HGB 10.9 (L) 2025    HCT 31.7 (L) 2025    MCV 93 2025     2025       BMP  Lab Results   Component Value Date     2025    K 4.7 2025     (H) 2025    CO2 22 (L) 2025    BUN 22 2025    CREATININE 1.0 2025    CALCIUM 9.0 2025    ANIONGAP 8 2025    EGFRNORACEVR >60 2025       Lab Results   Component Value Date    PSA 3.32 2025    PSA 2.5 2022    PSA 1.4 2020    PSA 1.4 2019    PSA 1.1 2018    PSA 1.5 2018    PSA 0.98 2017    PSA 0.89 2015    PSA 0.69 08/15/2013    PSA 0.61 2011    PSADIAG 4.1 (H) 2025    PSADIAG 3.0 2024    PSADIAG 3.3 2024    PSADIAG 2.2 2023    PSADIAG 2.1 2022        Imagin2025  MRI PROSTATE W W/O CONTRAST     CLINICAL HISTORY:  Prostate cancer suspected;  " Elevated prostate specific antigen (PSA)     Additional history: None provided.     TECHNIQUE:  Multiparametric MRI of the prostate/pelvis performed on a 3T scanner with phase pelvic coil. Multiplanar, multisequence images including high resolution, small field-of-view T2-WI; axial diffusion weighted images with multiple B-values and creation of ADC-maps; and dynamic contrast enhanced T1-weighted images through the prostate were obtained before, during, and after the administration of 10 cc intravenous gadolinium.     COMPARISON:  PET-CT 10/20/2020     FINDINGS:  Previous biopsy: None.     PSA: 4.1 ng/mL 02/05/2025     Prior therapy: None     Prostate: 4.7 x 4.0 x 3.4 cm corresponding to a computed volume of 33.02 cc.     Peripheral zone: No suspicious lesions identified.     Transitional zone:     Lesion (LUZMARIA) #T-1     Location: Side:right; Region: mid; Zone: anterior     Greatest dimension: 1.7 cm     T2-WI: Lenticular or non-circumscribed, homogeneous,moderately hypointense, and greater than 1.5 cm in greatest dimension.     DWI/ADC: Same as 4 but ?1.5 cm in greatest dimension or definite extraprostateic extension/invasive behavior, score 5.     DCE: Positive     Prostate Margin: Tumor contact with capsule, without evidence of extraprostatic extension     PI-RADS assessment category: 5     Lesion (LUZMARIA) #T-2     Location: Side:right; Region: mid; Zone: posterior     Greatest dimension: 0.8 cm     T2-WI: Lenticular or non-circumscribed, homogeneous,moderately hypointense, and <1.5 cm in greatest dimension, score 4.     DWI/ADC: Focal markedly hypointense on ADC and markedly hyperintense on high b-value DWI; <1.5 cm in greatest dimension, score 4.     DCE: Positive     Prostate Margin: No involvement-clearly confined tumor     PI-RADS assessment category: 4     Neurovascular bundle: Normal     Seminal vesicles: Normal appearance.     Adjacent Organ Involvement: No evidence for urinary bladder or rectal invasion.      Lymphadenopathy: None.     Other Findings: Right rectus femoris intramuscular lipoma.  Small left fat containing inguinal hernia.  Diverticulosis coli.     Impression:     1. Two suspicious lesions are identified in the transitional zone at the mid prostate, as above.  Overall Assessment: 5     Extraprostatic extension: Negative     Number of targets created for potential MR/US fusion biopsy     Peripheral zone: 0     Transition zone: 2     Electronically signed by resident: Chanell Lewis  Date:                                            02/13/2025  Time:                                           10:15     Electronically signed by:Flash Cox MD  Date:                                            02/13/2025  Time:                                           11:11        4/7/2025  NM PET CT F 18 PYL PSMA, MIDTHIGH TO VERTEX     CLINICAL HISTORY:  prostate cancer;  Malignant neoplasm of prostate     TECHNIQUE:  Approximately 60 minutes following the intravenous injection of 10.16 mCi F-18 piflufolastat, PET images were acquired from the proximal thighs to the skull vertex. CT images were also acquired for attenuation correction and anatomic localization and performed without oral or IV contrast.     COMPARISON:  MRI prostate 02/13/2025.     FINDINGS:  In the head and neck, there is physiologic uptake in the lacrimal and salivary glands, and there are no tracer avid lesions suspicious for malignancy.     In the chest, there are no tracer avid lesions suspicious for malignancy.     In the abdomen and pelvis, there is multifocal increased tracer uptake in the prostate, SUV max 7.2.  No suspicious tracer avid abdominopelvic lymph nodes.     There is physiologic distribution in the liver, spleen, bowel, and genitourinary tract, and there are no tracer avid lesions suspicious for malignancy.     In the bones, there is physiologic uptake in the bone marrow, and there are no tracer avid lesions suspicious for malignancy.      Additional CT findings: Mild calcific aortic and coronary atherosclerosis.  Cholelithiasis.  Colonic diverticulosis.  Small fat containing umbilical hernia.     Impression:     Abnormal tracer uptake in the prostate compatible with prostate cancer.  No tracer avid metastasis.        Electronically signed by:Karan Garcia  Date:                                            04/08/2025  Time:                                           09:02    Assessment: Jose Sahni is a 70 y.o. male with favorable intermediate risk prostate cancer.    Plan:   1. To OR for Cryoablation of the prostate on 5/16/2025.   2. The risks and benefits of cryoablation were discussed in depth, including preoperative preparation, procedural technique (including cryoprobe placement and freeze-thaw cycles), postoperative recuperation and recovery, and short- and long-term complications. I discussed the risks of bleeding, infection, urinary retention, incontinence, and erectile dysfunction. I also discussed the risk of urethral or rectal injury, fistula formation, and the possibility of incomplete cancer control requiring future treatment. The patient voices understanding and all questions have been answered. The patient agrees to proceed as planned. Consents signed.   3. Urine for culture   4. Pre-operative PSA completed  5. Pre-operative clearance on 5/5. Will follow up.   6. Cardiac clearance for history of bicuspid aortic valve on 5/7. Will follow up.      Mana Darby MD           [1]   Current Outpatient Medications on File Prior to Visit   Medication Sig Dispense Refill    amLODIPine (NORVASC) 5 MG tablet Take 1 tablet (5 mg total) by mouth once daily. 90 tablet 3    ascorbic acid, vitamin C, (VITAMIN C) 1000 MG tablet Take 1,000 mg by mouth once daily.      betamethasone dipropionate 0.05 % cream AAA ears bid prn 60 g 3    folic acid (FOLVITE) 1 MG tablet Take 1 mg by mouth once daily.      pravastatin (PRAVACHOL) 20 MG tablet Take 1 tablet  (20 mg total) by mouth once daily. 90 tablet 3    triamcinolone acetonide 0.025% (KENALOG) 0.025 % cream AAA gluteal cleft bid prn flare 30 g 3    [DISCONTINUED] sildenafil (VIAGRA) 100 MG tablet Take 1 tablet (100 mg total) by mouth daily as needed for Erectile Dysfunction. 30 tablet 2     No current facility-administered medications on file prior to visit.

## 2025-05-04 NOTE — HPI
This is a 70 y.o. male  who presents today for a preoperative evaluation in preparation for prostate cryoablation.  Surgery is indicated for prostate cancer.   Patient is new to me.  The history has been obtained by speaking with the patient and reviewing the electronic medical record and/or outside health information. Significant health conditions for the perioperative period are discussed below in assessment and plan.   Patient reports current health status to be Good.  Denies any new symptoms before surgery.

## 2025-05-04 NOTE — OUTPATIENT SUBJECTIVE & OBJECTIVE
Outpatient Subjective & Objective      Chief Complaint: Preoperative evaulation, perioperative medical management, and complication reduction plan.     Functional Capacity: walks dog once a day for 2 miles without CP/SOB.      Anesthesia issues: None    Difficulty mouth opening: No    Steroid use in the last 12 months:  No    Dental Issues: None    Family anesthesia difficulty: None     Family Hx of Thrombosis: None        Past Medical History:   Diagnosis Date    Allergy     Anemia of unknown etiology 12/18/2019    -followed in hematology    Asymptomatic cholelithiasis     Basal cell carcinoma 04/2024    right lateral eyebrow-excised by Dr. Owens    Heart murmur     History of hepatitis C,s/p successful Rx w/ SVR24 (cure) - 6/2018 10/18/2016    GT2, tx naive  2014 liver biopsy, grade 1 stage 1 9-2017 Fibroscan = F2   Completed Epclusa 12wks w/ SVR    Hypertension     Tuberculosis     Hx postive- + PPD; states no meds         Past Medical History Pertinent Negatives:   Diagnosis Date Noted    Asthma 05/05/2025    COPD (chronic obstructive pulmonary disease) 05/05/2025    Deep vein thrombosis 05/05/2025    Depression 05/05/2025    Diabetes mellitus, type 2 05/05/2025    Disorder of kidney and ureter 05/05/2025    Myocardial infarction 05/05/2025    Pulmonary embolism 05/05/2025    Seizures 05/05/2025    Stroke 05/05/2025         Past Surgical History:   Procedure Laterality Date    CARDIAC SURGERY  01/01/1962    COLONOSCOPY N/A 09/27/2022    Procedure: COLONOSCOPY;  Surgeon: Antonio Russo MD;  Location: [address]);  Service: Endoscopy;  Laterality: N/A;  Not vaccinated. will do home test and notify of results.EC    FRACTURE SURGERY  Jan 2009    Athrodesis    HAND SURGERY  01/01/2008    Left hand    HERNIA REPAIR  01/01/1967    MANDIBLE FRACTURE SURGERY  1962    orchiopexy, left  age 12       Review of Systems   Constitutional:  Negative for chills, fatigue, fever and unexpected weight change.   HENT:   "Negative for congestion, hearing loss, rhinorrhea, sore throat, tinnitus and trouble swallowing.    Eyes:  Negative for visual disturbance.   Respiratory:  Negative for cough, chest tightness, shortness of breath and wheezing.    Cardiovascular:  Negative for chest pain, palpitations and leg swelling.   Gastrointestinal:  Negative for constipation and diarrhea.   Genitourinary:  Negative for decreased urine volume, difficulty urinating, dysuria, frequency, hematuria and urgency.        Nocturia; incomplete bladder emptying    Musculoskeletal:  Negative for arthralgias, back pain, neck pain and neck stiffness.   Neurological:  Negative for dizziness, syncope, weakness, numbness and headaches.   Hematological:  Bruises/bleeds easily.   Psychiatric/Behavioral:  Negative for sleep disturbance and suicidal ideas.               VITALS  Visit Vitals  BP (!) 159/68 (BP Location: Right arm, Patient Position: Sitting)   Pulse 75   Temp 98.3 °F (36.8 °C) (Oral)   Ht 5' 8" (1.727 m)   Wt 75.3 kg (165 lb 14.3 oz)   SpO2 100%   BMI 25.22 kg/m²          Physical Exam  Vitals reviewed.   Constitutional:       General: He is not in acute distress.     Appearance: He is well-developed.   HENT:      Head: Normocephalic.      Nose: Nose normal.      Mouth/Throat:      Pharynx: No oropharyngeal exudate.   Eyes:      General:         Right eye: No discharge.         Left eye: No discharge.      Conjunctiva/sclera: Conjunctivae normal.      Pupils: Pupils are equal, round, and reactive to light.   Neck:      Thyroid: No thyromegaly.      Vascular: No carotid bruit or JVD.      Trachea: No tracheal deviation.   Cardiovascular:      Rate and Rhythm: Normal rate and regular rhythm.      Pulses:           Carotid pulses are 2+ on the right side and 2+ on the left side.       Dorsalis pedis pulses are 2+ on the right side and 2+ on the left side.        Posterior tibial pulses are 2+ on the right side and 2+ on the left side.      Heart " sounds: Normal heart sounds. No murmur heard.  Pulmonary:      Effort: Pulmonary effort is normal. No respiratory distress.      Breath sounds: Normal breath sounds. No stridor. No wheezing, rhonchi or rales.   Abdominal:      General: Bowel sounds are normal. There is no distension.      Palpations: Abdomen is soft.      Tenderness: There is no abdominal tenderness. There is no guarding.   Musculoskeletal:      Cervical back: Normal range of motion. No pain with movement.      Right lower leg: No edema.      Left lower leg: No edema.   Lymphadenopathy:      Cervical: No cervical adenopathy.   Skin:     General: Skin is warm and dry.      Capillary Refill: Capillary refill takes 2 to 3 seconds.      Findings: No erythema or rash.   Neurological:      Mental Status: He is alert and oriented to person, place, and time.   Psychiatric:         Behavior: Behavior normal. Behavior is cooperative.          Significant Labs:  Lab Results   Component Value Date    WBC 7.12 04/02/2025    HGB 10.9 (L) 04/02/2025    HCT 31.7 (L) 04/02/2025     04/02/2025    CHOL 107 (L) 03/01/2024    TRIG 108 03/01/2024    HDL 27 (L) 03/01/2024    ALT 16 04/02/2025    AST 17 04/02/2025     04/02/2025    K 4.7 04/02/2025     (H) 04/02/2025    CREATININE 1.0 04/22/2025    BUN 22 04/02/2025    CO2 22 (L) 04/02/2025    TSH 2.150 03/01/2024    PSA 3.32 05/05/2025    INR 1.1 05/05/2025    HGBA1C 4.0 03/01/2024           EKG:   Normal sinus rhythm   Normal ECG   When compared with ECG of 19-Feb-2018 08:41,   No significant change was found   Confirmed by Tomas Marie ([id number]) on 5/5/2025 10:36:37 AM         2D ECHO:  TTE:  Results for orders placed or performed during the hospital encounter of 02/18/22   Echo   Result Value Ref Range    Ascending aorta 2.59 cm    STJ 3.06 cm    AV mean gradient 3 mmHg    Ao peak aggie 1.01 m/s    Ao VTI 20.32 cm    IVRT 102.76 msec    IVS 1.08 0.6 - 1.1 cm    LA size 3.47 cm    Left Atrium Major  Axis 5.83 cm    Left Atrium Minor Axis 5.65 cm    LVIDd 4.35 3.5 - 6.0 cm    LVIDs 2.86 2.1 - 4.0 cm    LVOT diameter 2.63 cm    LVOT peak VTI 13.58 cm    PW 0.98 0.6 - 1.1 cm    MV Peak A Rey 0.72 m/s    E wave deceleration time 168.54 msec    MV Peak E Rey 0.68 m/s    RA Major Axis 4.86 cm    RA Width 2.93 cm    Sinus 3.59 cm    TAPSE 2.08 cm    TDI LATERAL 0.10 m/s    TDI SEPTAL 0.09 m/s    LA WIDTH 4.26 cm    MV stenosis pressure 1/2 time 48.88 ms    LV Diastolic Volume 85.39 mL    LV Systolic Volume 31.10 mL    LVOT peak rey 0.73 m/s    LA Vol (MOD) 61.86 cm3    RV S' 14.85 cm/s    LV LATERAL E/E' RATIO 6.80 m/s    LV SEPTAL E/E' RATIO 7.56 m/s    FS 34 %    LA Vol 72.10 cm3    LV mass 151.24 g    Left Ventricle Relative Wall Thickness 0.45 cm    AV valve area 3.63 cm2    AV Velocity Ratio 0.72     AV index (prosthetic) 0.67     MV valve area p 1/2 method 4.50 cm2    E/A ratio 0.94     Mean e' 0.10 m/s    LVOT area 5.4 cm2    LVOT stroke volume 73.74 cm3    AV peak gradient 4 mmHg    E/E' ratio 7.16 m/s    BSA 1.9 m2    LV Systolic Volume Index 16.5 mL/m2    LV Diastolic Volume Index 45.18 mL/m2    VIBHA 38.2 mL/m2    LV Mass Index 80 g/m2    VIBHA (MOD) 32.7 mL/m2    Right Atrial Pressure (from IVC) 3 mmHg    EF 68 %    Narrative    · The left ventricle is normal in size with concentric remodeling and   normal systolic function.  · The estimated ejection fraction is 68%.  · Normal left ventricular diastolic function.  · Mild left atrial enlargement.  · Normal right ventricular size with normal right ventricular systolic   function.  · Mild right atrial enlargement.  · Mild to moderate pulmonic regurgitation.  · Normal central venous pressure (3 mmHg).  · Trivial pericardial effusion. Under the RA.              Active Cardiac Conditions: None        Revised Cardiac Risk Index   High -Risk Surgery  Intraperitoneal; Intrathoracic; suprainguinal vascular Yes- + 1 No- 0   History of Ischemic Heart Disease   (Hx of  MI/positive exercise test/current chest pain due to ischemia/use of nitrate therapy/EKG with pathological Q waves) Yes- + 1 No- 0   History of CHF  (Pulmonary edema/bilateral rales or S3 gallop/PND/CXR showing pulmonary vascular redistribution) Yes- + 1 No- 0   History of CVA   (Prior stroke or TIA) Yes- + 1 No- 0   Pre-operative treatment with insulin Yes- + 1 No- 0   Pre-operative creatinine > 2mg/dl Yes- + 1 No- 0   Total: 0      Risk Status:  Estimated risk of cardiac complications after non-cardiac surgery using the Revised Cardiac Risk Index for Preoperative risk is 3.9 %      ARISCAT ([place]et) risk index: Low: 1.6% risk of post-op pulmonary complications; Intermediate: 13.3% risk of post-op pulmonary complications if surgery is > 3 hours.     American Society of Anesthesiologists Physical Status classification (ASA): 3             Outpatient Subjective & Objective

## 2025-05-04 NOTE — ASSESSMENT & PLAN NOTE
S/P Colonoscopy 9/27/22  1. Cecal polyp (1 mm), biopsy:  Tubular adenoma   2. Transverse colon polyps x3, biopsy:  Fragments of tubular adenoma   3. Descending colon polyp (2 mm), biopsy:  Tubular adenoma   4. Rectosigmoid colon polyp, biopsy:  Tubulovillous adenoma

## 2025-05-04 NOTE — ASSESSMENT & PLAN NOTE
S/P corrective surgery at age 7  Followed per Cardiology; optimization pending 5/7/25  Denies: CP/PND/orthopnea/SOB/palpitations/Syncope/edema

## 2025-05-04 NOTE — ASSESSMENT & PLAN NOTE
Recommend  healthy diet (DASH/Mediterranean) and exercise.   Patient should exercise 30 minutes at least five times weekly once recovered from surgery. Limit alcohol.  Treated with: pravastatin

## 2025-05-05 ENCOUNTER — OFFICE VISIT (OUTPATIENT)
Dept: UROLOGY | Facility: CLINIC | Age: 70
End: 2025-05-05
Payer: MEDICARE

## 2025-05-05 ENCOUNTER — OFFICE VISIT (OUTPATIENT)
Dept: INTERNAL MEDICINE | Facility: CLINIC | Age: 70
End: 2025-05-05
Payer: MEDICARE

## 2025-05-05 ENCOUNTER — HOSPITAL ENCOUNTER (OUTPATIENT)
Dept: CARDIOLOGY | Facility: CLINIC | Age: 70
Discharge: HOME | End: 2025-05-05
Payer: MEDICARE

## 2025-05-05 VITALS
HEART RATE: 75 BPM | OXYGEN SATURATION: 100 % | TEMPERATURE: 98 F | DIASTOLIC BLOOD PRESSURE: 68 MMHG | SYSTOLIC BLOOD PRESSURE: 159 MMHG | HEIGHT: 68 IN | BODY MASS INDEX: 25.14 KG/M2 | WEIGHT: 165.88 LBS

## 2025-05-05 DIAGNOSIS — Z86.19 HISTORY OF HEPATITIS C: ICD-10-CM

## 2025-05-05 DIAGNOSIS — Q24.9 CONGENITAL HEART ANOMALY: ICD-10-CM

## 2025-05-05 DIAGNOSIS — N40.1 BPH WITH URINARY OBSTRUCTION: ICD-10-CM

## 2025-05-05 DIAGNOSIS — D47.2 SMOLDERING MYELOMA: ICD-10-CM

## 2025-05-05 DIAGNOSIS — D58.0 HEREDITARY SPHEROCYTIC HEMOLYTIC ANEMIA: Chronic | ICD-10-CM

## 2025-05-05 DIAGNOSIS — Z01.818 PREOPERATIVE EXAMINATION: Primary | ICD-10-CM

## 2025-05-05 DIAGNOSIS — K76.0 HEPATIC STEATOSIS: ICD-10-CM

## 2025-05-05 DIAGNOSIS — I25.10 CORONARY ARTERY CALCIFICATION: ICD-10-CM

## 2025-05-05 DIAGNOSIS — I10 HYPERTENSION, ESSENTIAL: ICD-10-CM

## 2025-05-05 DIAGNOSIS — Z01.818 PREOPERATIVE TESTING: ICD-10-CM

## 2025-05-05 DIAGNOSIS — C61 PROSTATE CANCER: ICD-10-CM

## 2025-05-05 DIAGNOSIS — K63.5 POLYP OF COLON, UNSPECIFIED PART OF COLON, UNSPECIFIED TYPE: ICD-10-CM

## 2025-05-05 DIAGNOSIS — C61 PROSTATE CANCER: Primary | ICD-10-CM

## 2025-05-05 DIAGNOSIS — R97.20 ELEVATED PSA: ICD-10-CM

## 2025-05-05 DIAGNOSIS — D64.9 NORMOCYTIC ANEMIA: ICD-10-CM

## 2025-05-05 DIAGNOSIS — Q23.81 BICUSPID AORTIC VALVE: ICD-10-CM

## 2025-05-05 DIAGNOSIS — N13.8 BPH WITH URINARY OBSTRUCTION: ICD-10-CM

## 2025-05-05 DIAGNOSIS — E78.5 HYPERLIPIDEMIA, UNSPECIFIED HYPERLIPIDEMIA TYPE: ICD-10-CM

## 2025-05-05 LAB
OHS QRS DURATION: 90 MS
OHS QTC CALCULATION: 428 MS

## 2025-05-05 PROCEDURE — 99213 OFFICE O/P EST LOW 20 MIN: CPT | Mod: PBBFAC,25 | Performed by: NURSE PRACTITIONER

## 2025-05-05 PROCEDURE — 99999 PR PBB SHADOW E&M-EST. PATIENT-LVL I: CPT | Mod: PBBFAC,,,

## 2025-05-05 PROCEDURE — 99211 OFF/OP EST MAY X REQ PHY/QHP: CPT | Mod: PBBFAC,25,27

## 2025-05-05 PROCEDURE — 93005 ELECTROCARDIOGRAM TRACING: CPT | Mod: PBBFAC | Performed by: STUDENT IN AN ORGANIZED HEALTH CARE EDUCATION/TRAINING PROGRAM

## 2025-05-05 PROCEDURE — 87086 URINE CULTURE/COLONY COUNT: CPT

## 2025-05-05 PROCEDURE — 93010 ELECTROCARDIOGRAM REPORT: CPT | Mod: S$PBB,,, | Performed by: STUDENT IN AN ORGANIZED HEALTH CARE EDUCATION/TRAINING PROGRAM

## 2025-05-05 PROCEDURE — 99499 UNLISTED E&M SERVICE: CPT | Mod: S$PBB,,, | Performed by: STUDENT IN AN ORGANIZED HEALTH CARE EDUCATION/TRAINING PROGRAM

## 2025-05-05 PROCEDURE — 99999 PR PBB SHADOW E&M-EST. PATIENT-LVL III: CPT | Mod: PBBFAC,,, | Performed by: NURSE PRACTITIONER

## 2025-05-05 NOTE — DISCHARGE INSTRUCTIONS
.Your surgery has been scheduled for:______________5/16/25____________________________    You should report to:  ____[facility], located on the [place] side of the first floor of the           [facility] ([phone])  __X__[facility], located on the [facility] side of the            Second floor of the [facility] ([phone])  ____3rd Floor SSCU located on the [facility] side of the [facility] [phone]  ____[facility]: located at [address] [place] [address]. [address].     Please Note   Tell your doctor if you take Aspirin, products containing Aspirin, herbal medications  or blood thinners, such as Coumadin, Ticlid, or Plavix.  (Consult your provider regarding holding or stopping before surgery).  Arrange for someone to drive you home following surgery.  You will not be allowed to leave the surgical facility alone or drive yourself home following sedation and anesthesia.    Before Surgery  Stop taking all herbal medications, vitamins, and supplements 7 days prior to surgery  No Motrin/Advil (Ibuprofen) 7 days before surgery  No Aleve (Naproxen) 7 days before surgery   No Goody's/BC Powder 7 days before surgery  Refrain from drinking alcoholic beverages for 24 hours before and after surgery  Stop or limit smoking at least 24 hours prior to surgery  You may take Tylenol for pain    Night before Surgery  Do not eat or drink after midnight  Take a shower or bath (shower is recommended).  Bathe with Hibiclens soap or an antibacterial soap from the neck down.  If not supplied by your surgeon, hibiclens soap will need to be purchased over the counter in pharmacy.  Rinse soap off thoroughly.  Shampoo your hair with your regular shampoo    The Day of Surgery  Take another bath or shower with hibiclens or any antibacterial soap, to reduce the chance of infection.  Take heart  and blood pressure medications with a small sip of water, as advised by the perioperative team.  Do not take fluid pills  You may brush your teeth and rinse your mouth, but do not swallow any additional water.   Do not apply perfumes, powder, body lotions or deodorant on the day of surgery.  Nail polish should be removed.  Do not wear makeup or moisturizer  Wear comfortable clothes, such as a button front shirt and loose fitting pants.  Leave all jewelry, including body piercings, and valuables at home.    Bring any devices you will need after surgery such as crutches or canes.  If you have sleep apnea, please bring your CPAP machine  In the event that your physical condition changes including the onset of a cold or respiratory illness, or if you have to delay or cancel your surgery, please notify your surgeon.

## 2025-05-06 ENCOUNTER — TELEPHONE (OUTPATIENT)
Dept: CARDIOLOGY | Facility: CLINIC | Age: 70
End: 2025-05-06
Payer: MEDICARE

## 2025-05-07 ENCOUNTER — OFFICE VISIT (OUTPATIENT)
Dept: CARDIOLOGY | Facility: CLINIC | Age: 70
End: 2025-05-07
Payer: MEDICARE

## 2025-05-07 ENCOUNTER — TELEPHONE (OUTPATIENT)
Dept: CARDIOLOGY | Facility: CLINIC | Age: 70
End: 2025-05-07

## 2025-05-07 ENCOUNTER — HOSPITAL ENCOUNTER (OUTPATIENT)
Dept: CARDIOLOGY | Facility: HOSPITAL | Age: 70
Discharge: HOME | End: 2025-05-07
Attending: FAMILY MEDICINE
Payer: MEDICARE

## 2025-05-07 VITALS
HEIGHT: 68 IN | HEART RATE: 71 BPM | WEIGHT: 163.81 LBS | DIASTOLIC BLOOD PRESSURE: 68 MMHG | SYSTOLIC BLOOD PRESSURE: 151 MMHG | OXYGEN SATURATION: 100 % | BODY MASS INDEX: 24.83 KG/M2

## 2025-05-07 DIAGNOSIS — Q23.81 BICUSPID AORTIC VALVE: Primary | ICD-10-CM

## 2025-05-07 DIAGNOSIS — I10 HYPERTENSION, ESSENTIAL: ICD-10-CM

## 2025-05-07 LAB
ABDOMINAL AORTA MID PSV: 127 CM/S
BACTERIA UR CULT: NO GROWTH
LEFT RENAL DIST DIAS: 36 CM/S
LEFT RENAL DIST SYS: 118 CM/S
LEFT RENAL MID DIAS: 29 CM/S
LEFT RENAL MID RAR: 1.21
LEFT RENAL MID SYS: 154 CM/S
LEFT RENAL ORIGIN DIAS: 31 CM/S
LEFT RENAL ORIGIN SYS: 160 CM/S
LEFT RENAL PROX DIAS: 22 CM/S
LEFT RENAL PROX SYS: 128 CM/S
LEFT RENAL ULTRASOUND ACCELERATION TIME MEASUREMENT 1: 14 MS
LEFT RENAL ULTRASOUND ACCELERATION TIME MEASUREMENT 2: 26 MS
LEFT RENAL ULTRASOUND ACCELERATION TIME MEASUREMENT 3: 23 MS
LEFT RENAL ULTRASOUND ACCELERATION TIME MEASUREMENT AVERAGE: 26 MS
LEFT RENAL ULTRASOUND KIDNEY SIZE MEASUREMENT 1: 11.4 CM
LEFT RENAL ULTRASOUND KIDNEY SIZE MEASUREMENT AVERAGE: 11.4 CM
LEFT RENAL ULTRASOUND RESISTIVE INDEX MEASUREMENT 1: 0.54
LEFT RENAL ULTRASOUND RESISTIVE INDEX MEASUREMENT 2: 0.77
LEFT RENAL ULTRASOUND RESISTIVE INDEX MEASUREMENT 3: 0.68
LEFT RENAL ULTRASOUND RESISTIVE INDEX MEASUREMENT AVERAGE: 0.77
OHS CV LEFT RENAL RAR: 1.26
OHS CV RIGHT RENAL RAR: 1.51
OHS CV US LEFT RENAL HIGHEST EDV: 36
OHS CV US LEFT RENAL HIGHEST PSV: 160
OHS CV US RIGHT RENAL HIGHEST EDV: 34
OHS CV US RIGHT RENAL HIGHEST PSV: 192
RIGHT RENAL DIST DIAS: 11 CM/S
RIGHT RENAL DIST SYS: 42 CM/S
RIGHT RENAL MID DIAS: 31 CM/S
RIGHT RENAL MID RAR: 1.07
RIGHT RENAL MID SYS: 136 CM/S
RIGHT RENAL ORIGIN DIAS: 33 CM/S
RIGHT RENAL ORIGIN SYS: 125 CM/S
RIGHT RENAL PROX DIAS: 34 CM/S
RIGHT RENAL PROX SYS: 192 CM/S
RIGHT RENAL ULTRASOUND ACCELERATION TIME MEASUREMENT 1: 29 MS
RIGHT RENAL ULTRASOUND ACCELERATION TIME MEASUREMENT 2: 29 MS
RIGHT RENAL ULTRASOUND ACCELERATION TIME MEASUREMENT 3: 31 MS
RIGHT RENAL ULTRASOUND ACCELERATION TIME MEASUREMENT AVERAGE: 31 MS
RIGHT RENAL ULTRASOUND KIDNEY SIZE MEASUREMENT 1: 10.4 CM
RIGHT RENAL ULTRASOUND KIDNEY SIZE MEASUREMENT AVERAGE: 10.4 CM
RIGHT RENAL ULTRASOUND RESISTIVE INDEX MEASUREMENT 1: 0.76
RIGHT RENAL ULTRASOUND RESISTIVE INDEX MEASUREMENT 2: 0.72
RIGHT RENAL ULTRASOUND RESISTIVE INDEX MEASUREMENT 3: 0.66
RIGHT RENAL ULTRASOUND RESISTIVE INDEX MEASUREMENT AVERAGE: 0.76

## 2025-05-07 PROCEDURE — 99214 OFFICE O/P EST MOD 30 MIN: CPT | Mod: S$PBB,,, | Performed by: STUDENT IN AN ORGANIZED HEALTH CARE EDUCATION/TRAINING PROGRAM

## 2025-05-07 PROCEDURE — 99213 OFFICE O/P EST LOW 20 MIN: CPT | Mod: PBBFAC,25 | Performed by: STUDENT IN AN ORGANIZED HEALTH CARE EDUCATION/TRAINING PROGRAM

## 2025-05-07 PROCEDURE — 93975 VASCULAR STUDY: CPT | Mod: 26,,, | Performed by: INTERNAL MEDICINE

## 2025-05-07 PROCEDURE — 93975 VASCULAR STUDY: CPT

## 2025-05-07 PROCEDURE — 99999 PR PBB SHADOW E&M-EST. PATIENT-LVL III: CPT | Mod: PBBFAC,,, | Performed by: STUDENT IN AN ORGANIZED HEALTH CARE EDUCATION/TRAINING PROGRAM

## 2025-05-07 NOTE — PROGRESS NOTES
"    PCP - Doug Cordero MD  Referring Physician:     Subjective:   Patient ID:  Jose Sahni is a 70 y.o. male with past medical history of:     Former patient of Dr. Paredes. Per his last note:  "The patient is a 68 year old male with congenital heart diease. At age 8 he had a VSD closure and and  repair  dextro-transposition ???. He continues to do well and denies chest pain or LEGGETT. He walks his dog daily."     Per prior note from Dr. Mon:  "Bicuspid aortic valve/congenital cardiac anomaly:  He was born with congenital heart disease, which he had corrective surgery for at age 7, he does not know the name of the hospital where he had his surgery. There was apparently an attempt to obtain his records (from a few hospitals that he thought may have had his records) but we do not have them."    Today, the patient states that he is doing very well overall.  He can climb 2 flights of steps without having any shortness of breath or chest pain.  No new issues since his last visit in our cardiology clinic.  Taking all medications as prescribed.    History:     Social History     Tobacco Use    Smoking status: Never    Smokeless tobacco: Never   Substance Use Topics    Alcohol use: Yes     Alcohol/week: 20.0 standard drinks of alcohol     Types: 20 Cans of beer per week     Comment: occasional     Family History   Problem Relation Name Age of Onset    Stroke Mother Julieta     Heart disease Father Charles Sahni     Cancer Paternal Aunt Raina Sahni     Cancer Paternal Uncle Karan Sahni     Early death Paternal Cousin Adonis Sahni     Melanoma Neg Hx         Meds:     Review of patient's allergies indicates:   Allergen Reactions    Benazepril hcl Other (See Comments)     Coughing     Current Medications[1]      Objective:   BP (!) 151/68   Pulse 71   Ht 5' 8" (1.727 m)   Wt 74.3 kg (163 lb 12.8 oz)   SpO2 100%   BMI 24.91 kg/m²     Physical Exam  Gen: No apparent distress, resting comfortably  HEENT: Pupils " "equal and reactive to light  Cardio: Regular rate, point of maximal impulse not displaced, no murmur noted, 2+ radial pulses bilaterally, 2+ DP pulses bilaterally  Resp: CTAB, no wheezing  Abd: Soft, non-tender, non-distended  Skin: Warm, dry, no peripheral edema noted  Neuro: Alert and oriented x3  Psych: Normal mood and affect      Labs:     Lab Results   Component Value Date     04/02/2025     03/10/2025    K 4.7 04/02/2025    K 5.0 03/10/2025     (H) 04/02/2025     03/10/2025    CO2 22 (L) 04/02/2025    CO2 22 (L) 03/10/2025    BUN 22 04/02/2025    CREATININE 1.0 04/22/2025    ANIONGAP 8 04/02/2025     Lab Results   Component Value Date    HGBA1C 4.0 03/01/2024     No results found for: "BNP", "BNPTRIAGEBLO"    Lab Results   Component Value Date    WBC 7.12 04/02/2025    HGB 10.9 (L) 04/02/2025    HGB 12.5 (L) 04/12/2024    HCT 31.7 (L) 04/02/2025    HCT 35.8 (L) 04/12/2024     04/02/2025     04/12/2024    GRAN 3.8 04/12/2024    GRAN 63.4 04/12/2024     Lab Results   Component Value Date    CHOL 107 (L) 03/01/2024    HDL 27 (L) 03/01/2024    LDLCALC 58.4 (L) 03/01/2024    TRIG 108 03/01/2024       Lab Results   Component Value Date     04/02/2025     03/10/2025    K 4.7 04/02/2025    K 5.0 03/10/2025     (H) 04/02/2025     03/10/2025    CO2 22 (L) 04/02/2025    CO2 22 (L) 03/10/2025    BUN 22 04/02/2025    CREATININE 1.0 04/22/2025    ANIONGAP 8 04/02/2025     Lab Results   Component Value Date    HGBA1C 4.0 03/01/2024     No results found for: "BNP", "BNPTRIAGEBLO" Lab Results   Component Value Date    WBC 7.12 04/02/2025    HGB 10.9 (L) 04/02/2025    HGB 12.5 (L) 04/12/2024    HCT 31.7 (L) 04/02/2025    HCT 35.8 (L) 04/12/2024     04/02/2025     04/12/2024    GRAN 3.8 04/12/2024    GRAN 63.4 04/12/2024     Lab Results   Component Value Date    CHOL 107 (L) 03/01/2024    HDL 27 (L) 03/01/2024    LDLCALC 58.4 (L) 03/01/2024    TRIG 108 " 03/01/2024                Cardiovascular Imaging:     Echo 2/18/22:  The left ventricle is normal in size with concentric remodeling and normal systolic function.  The estimated ejection fraction is 68%.  Normal left ventricular diastolic function.  Mild left atrial enlargement.  Normal right ventricular size with normal right ventricular systolic function.  Mild right atrial enlargement.  Mild to moderate pulmonic regurgitation.  Normal central venous pressure (3 mmHg).  Trivial pericardial effusion. Under the RA.       Stress test:     C:    Assessment & Plan:     Preoperative cardiovascular evaluation   Patient is low risk by RCRI(3% risk of MACE at 30 days) for an intermediate risk surgery.  No further testing is indicated prior to surgery.  I am ordering an echocardiogram as part of yearly surveillance for his bicuspid aortic valve.  This echocardiogram should not delay surgery.    2. Bicuspid aortic valve  Not noted on most recent echo but noted in 2018   Repeat echocardiogram    3. Hx of VSD repair?  We do not have records of the surgery that he had at age 8. Patient history insinuates VSD repair.    RTC in one year or sooner if needed.    Signed:  Ganesh Marie MD  [facility]                [1]   Current Outpatient Medications:     amLODIPine (NORVASC) 5 MG tablet, Take 1 tablet (5 mg total) by mouth once daily., Disp: 90 tablet, Rfl: 3    ascorbic acid, vitamin C, (VITAMIN C) 1000 MG tablet, Take 1,000 mg by mouth once daily., Disp: , Rfl:     betamethasone dipropionate 0.05 % cream, AAA ears bid prn, Disp: 60 g, Rfl: 3    folic acid (FOLVITE) 1 MG tablet, Take 1 mg by mouth once daily., Disp: , Rfl:     pravastatin (PRAVACHOL) 20 MG tablet, Take 1 tablet (20 mg total) by mouth once daily., Disp: 90 tablet, Rfl: 3    triamcinolone acetonide 0.025% (KENALOG) 0.025 % cream, AAA gluteal cleft bid prn flare, Disp: 30 g, Rfl: 3

## 2025-05-08 ENCOUNTER — TELEPHONE (OUTPATIENT)
Dept: INTERNAL MEDICINE | Facility: CLINIC | Age: 70
End: 2025-05-08
Payer: MEDICARE

## 2025-05-08 ENCOUNTER — TELEPHONE (OUTPATIENT)
Dept: UROLOGY | Facility: CLINIC | Age: 70
End: 2025-05-08
Payer: MEDICARE

## 2025-05-08 DIAGNOSIS — N18.30 STAGE 3 CHRONIC KIDNEY DISEASE, UNSPECIFIED WHETHER STAGE 3A OR 3B CKD: Primary | ICD-10-CM

## 2025-05-08 DIAGNOSIS — I10 HYPERTENSION, ESSENTIAL: ICD-10-CM

## 2025-05-08 NOTE — ASSESSMENT & PLAN NOTE
-digital noted bump in Cr in April after changing to olmesartan - they suggested MEENU US. Neg.     Renal referral and repeat Cr.

## 2025-05-08 NOTE — TELEPHONE ENCOUNTER
Called and spoke with patient . Patient stated that he has a scheduled prostate surgery for next week. Will send information to schedule with nephrology. Patient asked did he need another creatinine since he just had one last month while attempting to schedule a lab appt.

## 2025-05-08 NOTE — TELEPHONE ENCOUNTER
Please call patient and explain that test(s) show no significant blockage in kidney arteries. The digital blood pressure program noted a change in kidney function.    Please see orders for creatinine and please schedule soon.     ALSO    I would like to refer to the nephrology department for further evaluation and treatment. Please schedule.    Thank you.

## 2025-05-10 ENCOUNTER — PATIENT MESSAGE (OUTPATIENT)
Dept: ADMINISTRATIVE | Facility: OTHER | Age: 70
End: 2025-05-10
Payer: MEDICARE

## 2025-05-15 ENCOUNTER — TELEPHONE (OUTPATIENT)
Dept: UROLOGY | Facility: CLINIC | Age: 70
End: 2025-05-15
Payer: MEDICARE

## 2025-05-16 ENCOUNTER — ANESTHESIA (OUTPATIENT)
Dept: SURGERY | Facility: HOSPITAL | Age: 70
End: 2025-05-16
Payer: MEDICARE

## 2025-05-16 ENCOUNTER — HOSPITAL ENCOUNTER (OUTPATIENT)
Facility: HOSPITAL | Age: 70
Discharge: HOME | End: 2025-05-16
Attending: STUDENT IN AN ORGANIZED HEALTH CARE EDUCATION/TRAINING PROGRAM | Admitting: STUDENT IN AN ORGANIZED HEALTH CARE EDUCATION/TRAINING PROGRAM
Payer: MEDICARE

## 2025-05-16 ENCOUNTER — ANESTHESIA EVENT (OUTPATIENT)
Dept: SURGERY | Facility: HOSPITAL | Age: 70
End: 2025-05-16
Payer: MEDICARE

## 2025-05-16 VITALS
BODY MASS INDEX: 25.01 KG/M2 | HEART RATE: 69 BPM | WEIGHT: 165 LBS | RESPIRATION RATE: 22 BRPM | HEIGHT: 68 IN | DIASTOLIC BLOOD PRESSURE: 67 MMHG | SYSTOLIC BLOOD PRESSURE: 145 MMHG | OXYGEN SATURATION: 95 % | TEMPERATURE: 98 F

## 2025-05-16 DIAGNOSIS — Z86.19 HISTORY OF HEPATITIS C: ICD-10-CM

## 2025-05-16 DIAGNOSIS — K76.0 HEPATIC STEATOSIS: ICD-10-CM

## 2025-05-16 DIAGNOSIS — Z01.818 PREOPERATIVE TESTING: ICD-10-CM

## 2025-05-16 DIAGNOSIS — C61 PROSTATE CANCER: Primary | ICD-10-CM

## 2025-05-16 PROCEDURE — 25000003 PHARM REV CODE 250: Performed by: NURSE ANESTHETIST, CERTIFIED REGISTERED

## 2025-05-16 PROCEDURE — 25000003 PHARM REV CODE 250: Performed by: STUDENT IN AN ORGANIZED HEALTH CARE EDUCATION/TRAINING PROGRAM

## 2025-05-16 PROCEDURE — C1769 GUIDE WIRE: HCPCS | Performed by: STUDENT IN AN ORGANIZED HEALTH CARE EDUCATION/TRAINING PROGRAM

## 2025-05-16 PROCEDURE — 63600175 PHARM REV CODE 636 W HCPCS

## 2025-05-16 PROCEDURE — D9220A PRA ANESTHESIA: Mod: CRNA,,, | Performed by: NURSE ANESTHETIST, CERTIFIED REGISTERED

## 2025-05-16 PROCEDURE — 27200708 HC INTUBATION/EXCHANGE WAND: Performed by: ANESTHESIOLOGY

## 2025-05-16 PROCEDURE — 37000009 HC ANESTHESIA EA ADD 15 MINS: Performed by: STUDENT IN AN ORGANIZED HEALTH CARE EDUCATION/TRAINING PROGRAM

## 2025-05-16 PROCEDURE — C2618 PROBE/NEEDLE, CRYO: HCPCS | Performed by: STUDENT IN AN ORGANIZED HEALTH CARE EDUCATION/TRAINING PROGRAM

## 2025-05-16 PROCEDURE — 55873 CRYOABLATE PROSTATE: CPT | Mod: ,,, | Performed by: STUDENT IN AN ORGANIZED HEALTH CARE EDUCATION/TRAINING PROGRAM

## 2025-05-16 PROCEDURE — 63600175 PHARM REV CODE 636 W HCPCS: Performed by: NURSE ANESTHETIST, CERTIFIED REGISTERED

## 2025-05-16 PROCEDURE — 36000706: Performed by: STUDENT IN AN ORGANIZED HEALTH CARE EDUCATION/TRAINING PROGRAM

## 2025-05-16 PROCEDURE — 71000015 HC POSTOP RECOV 1ST HR: Performed by: STUDENT IN AN ORGANIZED HEALTH CARE EDUCATION/TRAINING PROGRAM

## 2025-05-16 PROCEDURE — 36000707: Performed by: STUDENT IN AN ORGANIZED HEALTH CARE EDUCATION/TRAINING PROGRAM

## 2025-05-16 PROCEDURE — 37000008 HC ANESTHESIA 1ST 15 MINUTES: Performed by: STUDENT IN AN ORGANIZED HEALTH CARE EDUCATION/TRAINING PROGRAM

## 2025-05-16 PROCEDURE — 71000044 HC DOSC ROUTINE RECOVERY FIRST HOUR: Performed by: STUDENT IN AN ORGANIZED HEALTH CARE EDUCATION/TRAINING PROGRAM

## 2025-05-16 PROCEDURE — D9220A PRA ANESTHESIA: Mod: ANES,,, | Performed by: ANESTHESIOLOGY

## 2025-05-16 RX ORDER — SODIUM CHLORIDE 9 MG/ML
INJECTION, SOLUTION INTRAVENOUS CONTINUOUS
Status: DISCONTINUED | OUTPATIENT
Start: 2025-05-16 | End: 2025-05-16 | Stop reason: HOSPADM

## 2025-05-16 RX ORDER — LIDOCAINE HYDROCHLORIDE 20 MG/ML
JELLY TOPICAL
Status: DISCONTINUED | OUTPATIENT
Start: 2025-05-16 | End: 2025-05-16 | Stop reason: HOSPADM

## 2025-05-16 RX ORDER — LIDOCAINE HYDROCHLORIDE 20 MG/ML
INJECTION, SOLUTION EPIDURAL; INFILTRATION; INTRACAUDAL; PERINEURAL
Status: DISCONTINUED | OUTPATIENT
Start: 2025-05-16 | End: 2025-05-16

## 2025-05-16 RX ORDER — PROPOFOL 10 MG/ML
VIAL (ML) INTRAVENOUS
Status: DISCONTINUED | OUTPATIENT
Start: 2025-05-16 | End: 2025-05-16

## 2025-05-16 RX ORDER — MIDAZOLAM HYDROCHLORIDE 1 MG/ML
INJECTION INTRAMUSCULAR; INTRAVENOUS
Status: DISCONTINUED | OUTPATIENT
Start: 2025-05-16 | End: 2025-05-16

## 2025-05-16 RX ORDER — TAMSULOSIN HYDROCHLORIDE 0.4 MG/1
0.4 CAPSULE ORAL NIGHTLY
Qty: 30 CAPSULE | Refills: 0 | Status: SHIPPED | OUTPATIENT
Start: 2025-05-16 | End: 2025-06-15

## 2025-05-16 RX ORDER — ROCURONIUM BROMIDE 10 MG/ML
INJECTION, SOLUTION INTRAVENOUS
Status: DISCONTINUED | OUTPATIENT
Start: 2025-05-16 | End: 2025-05-16

## 2025-05-16 RX ORDER — HYDROMORPHONE HYDROCHLORIDE 1 MG/ML
0.2 INJECTION, SOLUTION INTRAMUSCULAR; INTRAVENOUS; SUBCUTANEOUS EVERY 5 MIN PRN
Status: DISCONTINUED | OUTPATIENT
Start: 2025-05-16 | End: 2025-05-16 | Stop reason: HOSPADM

## 2025-05-16 RX ORDER — IBUPROFEN 800 MG/1
800 TABLET, FILM COATED ORAL EVERY 6 HOURS PRN
Qty: 20 TABLET | Refills: 0 | Status: SHIPPED | OUTPATIENT
Start: 2025-05-16

## 2025-05-16 RX ORDER — DEXAMETHASONE SODIUM PHOSPHATE 4 MG/ML
INJECTION, SOLUTION INTRA-ARTICULAR; INTRALESIONAL; INTRAMUSCULAR; INTRAVENOUS; SOFT TISSUE
Status: DISCONTINUED | OUTPATIENT
Start: 2025-05-16 | End: 2025-05-16

## 2025-05-16 RX ORDER — HALOPERIDOL LACTATE 5 MG/ML
0.5 INJECTION, SOLUTION INTRAMUSCULAR EVERY 10 MIN PRN
Status: DISCONTINUED | OUTPATIENT
Start: 2025-05-16 | End: 2025-05-16 | Stop reason: HOSPADM

## 2025-05-16 RX ORDER — SULFAMETHOXAZOLE AND TRIMETHOPRIM 400; 80 MG/1; MG/1
1 TABLET ORAL DAILY
Qty: 7 TABLET | Refills: 0 | Status: SHIPPED | OUTPATIENT
Start: 2025-05-16 | End: 2025-05-23

## 2025-05-16 RX ORDER — DEXMEDETOMIDINE HYDROCHLORIDE 100 UG/ML
INJECTION, SOLUTION INTRAVENOUS
Status: DISCONTINUED | OUTPATIENT
Start: 2025-05-16 | End: 2025-05-16

## 2025-05-16 RX ORDER — FENTANYL CITRATE 50 UG/ML
INJECTION, SOLUTION INTRAMUSCULAR; INTRAVENOUS
Status: DISCONTINUED | OUTPATIENT
Start: 2025-05-16 | End: 2025-05-16

## 2025-05-16 RX ORDER — ONDANSETRON HYDROCHLORIDE 2 MG/ML
INJECTION, SOLUTION INTRAVENOUS
Status: DISCONTINUED | OUTPATIENT
Start: 2025-05-16 | End: 2025-05-16

## 2025-05-16 RX ORDER — GLUCAGON 1 MG
1 KIT INJECTION
Status: DISCONTINUED | OUTPATIENT
Start: 2025-05-16 | End: 2025-05-16 | Stop reason: HOSPADM

## 2025-05-16 RX ORDER — PHENYLEPHRINE HCL IN 0.9% NACL 1 MG/10 ML
SYRINGE (ML) INTRAVENOUS
Status: DISCONTINUED | OUTPATIENT
Start: 2025-05-16 | End: 2025-05-16

## 2025-05-16 RX ORDER — CEFAZOLIN 2 G/1
2 INJECTION, POWDER, FOR SOLUTION INTRAMUSCULAR; INTRAVENOUS
Status: COMPLETED | OUTPATIENT
Start: 2025-05-16 | End: 2025-05-16

## 2025-05-16 RX ADMIN — ONDANSETRON 4 MG: 2 INJECTION INTRAMUSCULAR; INTRAVENOUS at 02:05

## 2025-05-16 RX ADMIN — SUGAMMADEX 200 MG: 100 INJECTION, SOLUTION INTRAVENOUS at 02:05

## 2025-05-16 RX ADMIN — PROPOFOL 180 MG: 10 INJECTION, EMULSION INTRAVENOUS at 12:05

## 2025-05-16 RX ADMIN — ROCURONIUM BROMIDE 20 MG: 10 INJECTION INTRAVENOUS at 02:05

## 2025-05-16 RX ADMIN — SODIUM CHLORIDE: 0.9 INJECTION, SOLUTION INTRAVENOUS at 09:05

## 2025-05-16 RX ADMIN — Medication 50 MCG: at 01:05

## 2025-05-16 RX ADMIN — LIDOCAINE HYDROCHLORIDE 100 MG: 20 INJECTION, SOLUTION EPIDURAL; INFILTRATION; INTRACAUDAL at 12:05

## 2025-05-16 RX ADMIN — ROCURONIUM BROMIDE 50 MG: 10 INJECTION INTRAVENOUS at 12:05

## 2025-05-16 RX ADMIN — ROCURONIUM BROMIDE 20 MG: 10 INJECTION INTRAVENOUS at 12:05

## 2025-05-16 RX ADMIN — SODIUM CHLORIDE: 0.9 INJECTION, SOLUTION INTRAVENOUS at 11:05

## 2025-05-16 RX ADMIN — ROCURONIUM BROMIDE 10 MG: 10 INJECTION INTRAVENOUS at 01:05

## 2025-05-16 RX ADMIN — SODIUM CHLORIDE: 0.9 INJECTION, SOLUTION INTRAVENOUS at 12:05

## 2025-05-16 RX ADMIN — DEXAMETHASONE SODIUM PHOSPHATE 12 MG: 4 INJECTION INTRA-ARTICULAR; INTRALESIONAL; INTRAMUSCULAR; INTRAVENOUS; SOFT TISSUE at 12:05

## 2025-05-16 RX ADMIN — ROCURONIUM BROMIDE 20 MG: 10 INJECTION INTRAVENOUS at 01:05

## 2025-05-16 RX ADMIN — MIDAZOLAM 2 MG: 1 INJECTION INTRAMUSCULAR; INTRAVENOUS at 11:05

## 2025-05-16 RX ADMIN — FENTANYL CITRATE 100 MCG: 50 INJECTION INTRAMUSCULAR; INTRAVENOUS at 11:05

## 2025-05-16 RX ADMIN — DEXMEDETOMIDINE 12 MCG: 200 INJECTION, SOLUTION INTRAVENOUS at 12:05

## 2025-05-16 RX ADMIN — SODIUM CHLORIDE: 0.9 INJECTION, SOLUTION INTRAVENOUS at 01:05

## 2025-05-16 RX ADMIN — CEFAZOLIN 2 G: 2 INJECTION, POWDER, FOR SOLUTION INTRAMUSCULAR; INTRAVENOUS at 12:05

## 2025-05-16 NOTE — ANESTHESIA PROCEDURE NOTES
Intubation    Date/Time: 5/16/2025 12:13 PM    Performed by: Nivia Rider CRNA  Authorized by: Lucio Kothari MD    Intubation:     Induction:  Intravenous    Mask Ventilation:  Easy with oral airway    Attempts:  1    Attempted By:  CRNA    Method of Intubation:  Video laryngoscopy and bougie    Blade:  Longo 3    Laryngeal View Grade: Grade IIA - cords partially seen      Difficult Airway Encountered?: No      Complications:  None    Airway Device:  Oral endotracheal tube    Airway Device Size:  7.5    Style/Cuff Inflation:  Cuffed    Inflation Amount (mL):  5    Tube secured:  23    Secured at:  The teeth    Placement Verified By:  Capnometry and Revisualization with laryngoscopy    Complicating Factors:  Anterior larynx, narrow palate and small mouth    Findings Post-Intubation:  BS equal bilateral

## 2025-05-16 NOTE — TRANSFER OF CARE
"Anesthesia Transfer of Care Note    Patient: Jose Sahni    Procedure(s) Performed: Procedure(s) (LRB):  CRYOABLATION, PROSTATE (N/A)  CYSTOSCOPY (N/A)    Patient location: PACU    Anesthesia Type: general    Transport from OR: Transported from OR on 6-10 L/min O2 by face mask with adequate spontaneous ventilation    Post pain: adequate analgesia    Post assessment: no apparent anesthetic complications    Post vital signs: stable    Level of consciousness: awake and alert    Nausea/Vomiting: no nausea/vomiting    Complications: none    Transfer of care protocol was followed    Last vitals: Visit Vitals  BP (!) 155/72 (BP Location: Right arm, Patient Position: Lying)   Pulse 72   Temp 36.7 °C (98.1 °F) (Temporal)   Resp 18   Ht 5' 8" (1.727 m)   Wt 74.8 kg (165 lb)   SpO2 98%   BMI 25.09 kg/m²     "

## 2025-05-16 NOTE — DISCHARGE INSTRUCTIONS
What to Expect After a Prostate Procedure  Please be sure to finish your pre-procedure antibiotics as instructed. You will take antibiotics for 1 week, once per day.     You may have mild bleeding from the rectum or urine for about 1 week to 1 month, or in your ejaculate for several months. This bleeding is normal and expected, and it will stop. You may have mild discomfort in your rectal or urethral area for 24-48 hours.     You cannot do any strenuous lifting, straining, or exercising for 24 hours. You may return to full activity the day after the biopsy.     You may continue to take all your regular medications after the procedure except for the blood thinners for 72 hours.     You may resume all blood-thinning medications once you no longer see any bleeding or whenever your physician prescribing the medication says it is all right to do so. You may take Tylenol if you have a fever and your temperature is less than 100° F or if you have some discomfort.     You will receive a call from the Urology Department at [facility] with the results of your prostate biopsy typically within two weeks.     Signs and Symptoms to Report     Call your [facility] urologist at [phone] if you develop any of the following:  Temperature greater than 101°  F  Inability to urinate  A large amount of bleeding from the rectum or in the urine  Persistent or severe pain     After hours or on weekends, you may reach a urology resident on call at this number: [phone].    You will follow up in 1 week for catheter removal.

## 2025-05-16 NOTE — ANESTHESIA PREPROCEDURE EVALUATION
05/16/2025  Jose Sahni is a 70 y.o., male.      Pre-op Assessment          Review of Systems      Physical Exam  General: Cooperative, Alert and Oriented    Airway:  Mallampati: / II          Anesthesia Assessment: Preoperative EQUATION    Planned Procedure: Procedure(s) (LRB):  CRYOABLATION, PROSTATE (N/A)  Requested Anesthesia Type:General  Surgeon: Chepe Hand MD  Service: Urology  Known or anticipated Date of Surgery:5/16/2025    Surgeon notes: reviewed    Electronic QUestionnaire Assessment completed via nurse interview with patient.        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   04/02/2025 CMP, CBC  02/18/2022 CLAUDIA EF 68%    Instructions given. (See in Nurse's note)    Optimization:  Anesthesia Preop Clinic Assessment  Indicated    Medical Opinion Indicated       Sub-specialist consult indicated:   TBD       Plan:    Testing:  EKG, PT/INR, and PTT   Pre-anesthesia  visit       Visit focus: concerns in complex and/or prolonged anesthesia, airway concerns, position other than supine, last airway 2022     Consultation:IM Perioperative Hospitalist, Cards clear      Patient  has previously scheduled Medical Appointment:    Navigation: Tests Scheduled.              Consults scheduled.             Results will be tracked by Preop Clinic.        04/14/2025 IB request Cards(Dr. Lay) to add Preop cardiac clear visit 05/07/2025.     05/05/2025 Preop Cardiac appt 05/07/025 05/07/2025 Cards Clearance Dr. Lay : Preoperative cardiovascular evaluation   Patient is low risk by RCRI(3% risk of MACE at 30 days) for an intermediate risk surgery.  No further testing is indicated prior to surgery.  I am ordering an echocardiogram as part of yearly surveillance for his bicuspid aortic valve.  This echocardiogram should not delay surgery.                     Anesthesia Plan  Type of Anesthesia, risks & benefits discussed:    Anesthesia Type: Gen ETT  Intra-op Monitoring Plan: Standard ASA Monitors  Induction:  IV  Informed Consent: Informed consent signed with the Patient and all parties understand the risks and agree with anesthesia plan.  All questions answered.   ASA Score: 3  Day of Surgery Review of History & Physical: H&P Update referred to the surgeon/provider.    Ready For Surgery From Anesthesia Perspective.     .

## 2025-05-16 NOTE — INTERVAL H&P NOTE
Bedside report received. Patient A&O X 3, tele- SR-ST,  3-4L NC/mask at night,  Voids- low urine output, Activity- up with assist, Diet- Cardiac. Complains of pain 0/10 medicated per MAR. POC discussed with patient. Pt verbalized understanding. Call light and belongings with in reach.  Bed locked and in lowest position, alarm and fall precautions in place.    The patient has been examined and the H&P has been reviewed:    I concur with the findings and no changes have occurred since H&P was written.    Surgery risks, benefits and alternative options discussed and understood by patient/family.    - Consented in preop, no further questions from patient or family  - UA dipped: non concerning for infection  - To OR for Cryoablation  - Denies blood thinners      There are no hospital problems to display for this patient.

## 2025-05-16 NOTE — OP NOTE
[facility]     Operative Note     PREOPERATIVE DIAGNOSIS: Primary Prostate Cancer    Problem List[1]       POSTOPERATIVE DIAGNOSIS:  Same     OPERATION:   Primary targeted cryoablation of the prostate  2.   Transrectal ultrasonography of the prostate  3.   Cystoscopy    SURGEONS:   Surgeons and Role:     * Chepe Hand MD - Primary     * Geoff Carr MD - Resident - Assisting    ANESTHESIA:   Anesthesiologist: Lucio Kothari MD  CRNA: Nivia Rider CRNA    STAFF:   Circulator: Mayda Meek RN    ANESTHESIA TYPE:  General anesthesia     ESTIMATED BLOOD LOSS: Min    COMPLICATIONS:   None    ANTIBIOTICS:  Cephazolin     INTRAOPERATIVE THROMBOEMBOLISM PROPHYLAXIS:  Pneumatic compression stockings     ULTRASOUND FINDINGS:   TRUS: 32.94 mm height, 45.78 mm  width, 38.77 mm length for a volume 30 cc.    3 probes, 1 x 1 x 1 configuration. Ice lengths: row 1: 29 mm, row 2:  27 mm, row 3:  33 mm. Left nerve sparing, right partial nerve sparing.     INDICATIONS:  The patient is a gentleman who was recently diagnosed with primary prostate cancer and elected to proceed with the aforementioned procedure.  Prior to the operation, the nature of the surgery was discussed as well as the potential benefits, possible complications, and alternatives to therapy.  Those risks include but are not limited to bleeding/transfusion, infection/sepsis, reaction to anesthesia, heart attack, stroke, PE/DVT or other adverse medical outcome, positioning injury, urethral slough or obstruction necessitating additional therapy, inability to cure cancer or the possible need for further treatment, incontinence, impotence, rectourethral fistula, bladder neck contracture, urethral stricture, prolonged urinary retention, retrograde ejaculation, chronic pelvic/penile pain, penile numbness, scrotal edema, injury to surrounding structures or other unforseen complication.  After questions were answered, informed consent  was obtained before proceeding.     DESCRIPTION OF PROCEDURE:  The patient was taken to the operating room, anesthetized, and placed in a modified lithotomy position.  A time-out was called and we verified the correct patient, operative site and procedure [facility].  Also we verified that IV antibiotics and SCDs were administered.  The lower abdomen, genitalia, and perineum were then clipped, prepped and draped in sterile fashion and a loban drape was used to elevate the scrotum off the perineum.  A Tillman catheter was sterilely placed into the bladder and clamped.  The transrectal ultrasound was then placed per rectum in standard fashion.  The prostate was then imaged in both the sagittal and the transverse dimensions and measurements were recorded.     The hard copies of the mpMRI were previously displayed and the tumor location was identified.  We were sure to place probes in this location with excellent saturation. Using transrectal ultrasound guidance and the cryotherapy template, cryo probes were sequentially placed into the prostate by standard protocol.  Each cryo probe was localized in both the sagittal and the transverse dimensions.  After excellent saturation was obtained, a temperature thermocoupler was placed and in the neurovascular bundle.       At this point, the urethral Tillman was removed and cystoscopy was performed.  No cryo probes were seen traversing the urethra or the bladder.  A super-stiff guidewire was then placed and over this the urethral warming catheter was positioned.     After all systems were checked, a double freeze-thaw cycle was then performed.  The apical tissue was also carefully treated.  The ice ball was monitored both under realtime ultrasonography and by temperature thermocouplers.  An excellent freeze was achieved. Temperature at the level of the rectum was never allowed to reach destructive temperatures.     Following the final passive thaw, the  urethral catheter was allowed to remain in situ for an additional twenty minutes.  All cryo probes were then removed.  Pressure was held on the perineum for hemostasis.  A sterile urethral catheter was then placed over a guidewire to act as a urethral stent. We did not have  to irrigate the bladder with a Nohemi syringe  for a small number of clots. The patient was awakened after perineal dressings were applied.     DISPOSITION: The patient was taken to the recovery room in good condition.    FOLLOW UP CARE  The patient will follow up in 5-7 days for fowler catheter removal. He will be discharged home with prescriptions for antibiotics Bactrim DS QD x 7 days , Ibuprofen 800 TID, and Flomax for 30 days.         [1]   Patient Active Problem List  Diagnosis    Splenomegaly    Hypertension, essential    Hereditary spherocytic hemolytic anemia    History of hepatitis C,s/p successful Rx w/ SVR24 (cure) - 6/2018    AK (actinic keratosis)    Hepatic steatosis    Congenital heart anomaly    Bicuspid aortic valve    Smoldering myeloma    Low grade B cell lymphoproliferative disorder    Colon polyps    Prostate cancer    Hyperlipidemia    Coronary artery calcification    Normocytic anemia

## 2025-05-16 NOTE — DISCHARGE SUMMARY
[facility] (1st Fl)  Discharge Note  Short Stay    Procedure(s) (LRB):  CRYOABLATION, PROSTATE (N/A)  CYSTOSCOPY (N/A)      OUTCOME: Patient tolerated treatment/procedure well without complication and is now ready for discharge.    DISPOSITION: Home or Self Care    FINAL DIAGNOSIS:  Prostate cancer    FOLLOWUP: In clinic with Dr. Hand in 1 week for voiding trial    DISCHARGE INSTRUCTIONS:    Discharge Procedure Orders   APTT   Standing Status: Future Number of Occurrences: 1 Standing Exp. Date: 06/13/26     Order Specific Question Answer Comments   Send normal result to authorizing provider's In Basket if patient is active on MyChart: Yes      Protime-INR   Standing Status: Future Number of Occurrences: 1 Standing Exp. Date: 06/13/26     Order Specific Question Answer Comments   Send normal result to authorizing provider's In Basket if patient is active on MyChart: Yes      Notify your health care provider if you experience any of the following:  temperature >100.4     Notify your health care provider if you experience any of the following:  persistent nausea and vomiting or diarrhea     Notify your health care provider if you experience any of the following:  severe uncontrolled pain     Notify your health care provider if you experience any of the following:  redness, tenderness, or signs of infection (pain, swelling, redness, odor or green/yellow discharge around incision site)     Notify your health care provider if you experience any of the following:  worsening rash     Notify your health care provider if you experience any of the following:  persistent dizziness, light-headedness, or visual disturbances     EKG 12-lead   Standing Status: Future Number of Occurrences: 1 Standing Exp. Date: 04/14/26        TIME SPENT ON DISCHARGE: 10 minutes   Patient tolerated procedure well without   complication, hemostasis was obtained

## 2025-05-17 ENCOUNTER — NURSE TRIAGE (OUTPATIENT)
Dept: ADMINISTRATIVE | Facility: CLINIC | Age: 70
End: 2025-05-17
Payer: MEDICARE

## 2025-05-17 NOTE — TELEPHONE ENCOUNTER
Patient's wife, Gely Sahni, states patient is s/p Prostate Cryoablation on yesterday, 5/16/25. Wife states patient's c/o heartburn and is inquiring if patient can take a dose of Tums for relief of heartburn while using Flomax and an antibiotic. Wife declined triage/assessment of patient's symptoms at this time.    Patient's wife advised to Call her Pharmacist to discuss any contraindications for use of Tums with patient's prescription for Flomax and his antibiotic. Patient's wife also advised to contact the [facility] for any worsening symptoms or questions. Patient's wife states understanding of care advice.     Reason for Disposition   [1] Caller has medicine question about med NOT prescribed by PCP AND [2] triager unable to answer question (e.g., compatibility with other med, storage)    Additional Information   Negative: [1] Intentional drug overdose AND [2] suicidal thoughts or ideas   Negative: Drug overdose and triager unable to answer question   Negative: Caller requesting a renewal or refill of a medicine patient is currently taking   Negative: Caller requesting information unrelated to medicine   Negative: Caller requesting information about COVID-19 Vaccine   Negative: Caller requesting information about Emergency Contraception   Negative: Caller requesting information about Combined Birth Control Pills   Negative: Caller requesting information about Progestin Birth Control Pills   Negative: Caller requesting information about post-op pain or medicines   Negative: Caller requesting a prescription antibiotic (such as Penicillin) for Strep throat and has a positive culture result   Negative: Caller requesting a prescription anti-viral med (such as Tamiflu) and has influenza (flu) symptoms   Negative: Immunization reaction suspected   Negative: Rash while taking a medicine or within 3 days of stopping it   Negative: [1] Asthma and [2] having symptoms of asthma (cough, wheezing, etc.)    Negative: [1] Symptom of illness (e.g., headache, abdominal pain, earache, vomiting) AND [2] more than mild   Negative: Breastfeeding questions about mother's medicines and diet   Negative: MORE THAN A DOUBLE DOSE of a prescription or over-the-counter (OTC) drug   Negative: [1] DOUBLE DOSE (an extra dose or lesser amount) of prescription drug AND [2] any symptoms (e.g., dizziness, nausea, pain, sleepiness)   Negative: [1] DOUBLE DOSE (an extra dose or lesser amount) of over-the-counter (OTC) drug AND [2] any symptoms (e.g., dizziness, nausea, pain, sleepiness)   Negative: Took another person's prescription drug   Negative: [1] DOUBLE DOSE (an extra dose or lesser amount) of prescription drug AND [2] NO symptoms  (Exception: A double dose of antibiotics.)   Negative: Diabetes drug error or overdose (e.g., took wrong type of insulin or took extra dose)   Negative: [1] Prescription not at pharmacy AND [2] was prescribed by PCP recently (Exception: Triager has access to EMR and prescription is recorded there. Go to Home Care and confirm for pharmacy.)   Negative: [1] Pharmacy calling with prescription question AND [2] triager unable to answer question   Negative: [1] Caller has URGENT medicine question about med that PCP or specialist prescribed AND [2] triager unable to answer question   Negative: Medicine patch causing local rash or itching    Protocols used: Medication Question Call-A-

## 2025-05-19 NOTE — ANESTHESIA POSTPROCEDURE EVALUATION
Anesthesia Post Evaluation    Patient: Jose Sahni    Procedure(s) Performed: Procedure(s) (LRB):  CRYOABLATION, PROSTATE (N/A)  CYSTOSCOPY (N/A)    Final Anesthesia Type: general      Patient location during evaluation: PACU  Patient participation: Yes- Able to Participate  Level of consciousness: awake and alert  Post-procedure vital signs: reviewed and stable  Pain management: adequate  Airway patency: patent    PONV status at discharge: No PONV  Anesthetic complications: no      Cardiovascular status: blood pressure returned to baseline  Respiratory status: unassisted  Hydration status: euvolemic  Follow-up not needed.              Vitals Value Taken Time   /67 05/16/25 15:30   Temp 36.7 °C (98 °F) 05/16/25 15:30   Pulse 69 05/16/25 15:30   Resp 20 05/16/25 15:30   SpO2 95 % 05/16/25 15:30         No case tracking events are documented in the log.      Pain/Melody Score: No data recorded

## 2025-06-12 ENCOUNTER — OFFICE VISIT (OUTPATIENT)
Dept: HEMATOLOGY/ONCOLOGY | Facility: CLINIC | Age: 70
End: 2025-06-12
Payer: MEDICARE

## 2025-06-12 VITALS
SYSTOLIC BLOOD PRESSURE: 138 MMHG | OXYGEN SATURATION: 97 % | BODY MASS INDEX: 24.99 KG/M2 | DIASTOLIC BLOOD PRESSURE: 63 MMHG | RESPIRATION RATE: 18 BRPM | HEART RATE: 76 BPM | HEIGHT: 68 IN | TEMPERATURE: 98 F | WEIGHT: 164.88 LBS

## 2025-06-12 DIAGNOSIS — D47.Z9 LOW GRADE B CELL LYMPHOPROLIFERATIVE DISORDER: Primary | ICD-10-CM

## 2025-06-12 DIAGNOSIS — D47.2 SMOLDERING MYELOMA: ICD-10-CM

## 2025-06-12 PROCEDURE — 99213 OFFICE O/P EST LOW 20 MIN: CPT | Mod: PBBFAC | Performed by: INTERNAL MEDICINE

## 2025-06-12 PROCEDURE — 99214 OFFICE O/P EST MOD 30 MIN: CPT | Mod: S$PBB,,, | Performed by: INTERNAL MEDICINE

## 2025-06-12 PROCEDURE — 99999 PR PBB SHADOW E&M-EST. PATIENT-LVL III: CPT | Mod: PBBFAC,,, | Performed by: INTERNAL MEDICINE
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
## 2025-06-13 ENCOUNTER — LAB VISIT (OUTPATIENT)
Dept: LAB | Facility: HOSPITAL | Age: 70
End: 2025-06-13
Payer: MEDICARE

## 2025-06-13 DIAGNOSIS — D47.Z9 LOW GRADE B CELL LYMPHOPROLIFERATIVE DISORDER: ICD-10-CM

## 2025-06-13 LAB
ABSOLUTE EOSINOPHIL (OHS): 0.09 K/UL
ABSOLUTE MONOCYTE (OHS): 0.5 K/UL (ref 0.3–1)
ABSOLUTE NEUTROPHIL COUNT (OHS): 4.48 K/UL (ref 1.8–7.7)
ALBUMIN SERPL BCP-MCNC: 4.6 G/DL (ref 3.5–5.2)
ALP SERPL-CCNC: 65 UNIT/L (ref 40–150)
ALT SERPL W/O P-5'-P-CCNC: 19 UNIT/L (ref 10–44)
ANION GAP (OHS): 6 MMOL/L (ref 8–16)
AST SERPL-CCNC: 20 UNIT/L (ref 11–45)
BASOPHILS # BLD AUTO: 0.04 K/UL
BASOPHILS NFR BLD AUTO: 0.5 %
BILIRUB SERPL-MCNC: 1.6 MG/DL (ref 0.1–1)
BUN SERPL-MCNC: 17 MG/DL (ref 8–23)
CALCIUM SERPL-MCNC: 8.8 MG/DL (ref 8.7–10.5)
CHLORIDE SERPL-SCNC: 107 MMOL/L (ref 95–110)
CO2 SERPL-SCNC: 25 MMOL/L (ref 23–29)
CREAT SERPL-MCNC: 1.1 MG/DL (ref 0.5–1.4)
ERYTHROCYTE [DISTWIDTH] IN BLOOD BY AUTOMATED COUNT: 14.3 % (ref 11.5–14.5)
GFR SERPLBLD CREATININE-BSD FMLA CKD-EPI: >60 ML/MIN/1.73/M2
GLUCOSE SERPL-MCNC: 80 MG/DL (ref 70–110)
HCT VFR BLD AUTO: 36.9 % (ref 40–54)
HGB BLD-MCNC: 12.8 GM/DL (ref 14–18)
IGA SERPL-MCNC: 62 MG/DL (ref 40–350)
IGG SERPL-MCNC: 1992 MG/DL (ref 650–1600)
IGM SERPL-MCNC: 66 MG/DL (ref 50–300)
IMM GRANULOCYTES # BLD AUTO: 0.04 K/UL (ref 0–0.04)
IMM GRANULOCYTES NFR BLD AUTO: 0.5 % (ref 0–0.5)
LDH SERPL-CCNC: 143 U/L (ref 110–260)
LYMPHOCYTES # BLD AUTO: 2.36 K/UL (ref 1–4.8)
MCH RBC QN AUTO: 30.5 PG (ref 27–31)
MCHC RBC AUTO-ENTMCNC: 34.7 G/DL (ref 32–36)
MCV RBC AUTO: 88 FL (ref 82–98)
NUCLEATED RBC (/100WBC) (OHS): 0 /100 WBC
PLATELET # BLD AUTO: 175 K/UL (ref 150–450)
PMV BLD AUTO: 9.5 FL (ref 9.2–12.9)
POTASSIUM SERPL-SCNC: 5 MMOL/L (ref 3.5–5.1)
PROT SERPL-MCNC: 7.8 GM/DL (ref 6–8.4)
RBC # BLD AUTO: 4.2 M/UL (ref 4.6–6.2)
RELATIVE EOSINOPHIL (OHS): 1.2 %
RELATIVE LYMPHOCYTE (OHS): 31.4 % (ref 18–48)
RELATIVE MONOCYTE (OHS): 6.7 % (ref 4–15)
RELATIVE NEUTROPHIL (OHS): 59.7 % (ref 38–73)
RETICS/RBC NFR AUTO: 4.8 % (ref 0.4–2)
SODIUM SERPL-SCNC: 138 MMOL/L (ref 136–145)
WBC # BLD AUTO: 7.51 K/UL (ref 3.9–12.7)

## 2025-06-13 PROCEDURE — 83615 LACTATE (LD) (LDH) ENZYME: CPT

## 2025-06-13 PROCEDURE — 83521 IG LIGHT CHAINS FREE EACH: CPT

## 2025-06-13 PROCEDURE — 84165 PROTEIN E-PHORESIS SERUM: CPT

## 2025-06-13 PROCEDURE — 85025 COMPLETE CBC W/AUTO DIFF WBC: CPT

## 2025-06-13 PROCEDURE — 82784 ASSAY IGA/IGD/IGG/IGM EACH: CPT | Mod: 59

## 2025-06-13 PROCEDURE — 80053 COMPREHEN METABOLIC PANEL: CPT

## 2025-06-13 PROCEDURE — 85045 AUTOMATED RETICULOCYTE COUNT: CPT

## 2025-06-13 PROCEDURE — 36415 COLL VENOUS BLD VENIPUNCTURE: CPT

## 2025-06-16 LAB
ALBUMIN, SPE (OHS): 4.66 G/DL (ref 3.35–5.55)
ALPHA 1 GLOB (OHS): 0.29 GM/DL (ref 0.17–0.41)
ALPHA 2 GLOB (OHS): 0.58 GM/DL (ref 0.43–0.99)
BETA GLOB (OHS): 0.52 GM/DL (ref 0.5–1.1)
GAMMA GLOBULIN (OHS): 1.75 GM/DL (ref 0.67–1.58)
KAPPA LC FREE SER-MCNC: 4.46 MG/L (ref 0.26–1.65)
KAPPA LC FREE/LAMBDA FREE SER: 4.55 MG/DL (ref 0.33–1.94)
LAMBDA LC FREE SERPL-MCNC: 1.02 MG/DL (ref 0.57–2.63)
PROT SERPL-MCNC: 7.8 GM/DL (ref 6–8.4)

## 2025-06-17 LAB — PATHOLOGIST REVIEW - SPE (OHS): NORMAL

## 2025-06-21 ENCOUNTER — PATIENT MESSAGE (OUTPATIENT)
Dept: HEMATOLOGY/ONCOLOGY | Facility: CLINIC | Age: 70
End: 2025-06-21
Payer: MEDICARE

## 2025-06-23 ENCOUNTER — PATIENT MESSAGE (OUTPATIENT)
Dept: ADMINISTRATIVE | Facility: OTHER | Age: 70
End: 2025-06-23
Payer: MEDICARE

## 2025-06-23 DIAGNOSIS — D47.Z9 LOW GRADE B CELL LYMPHOPROLIFERATIVE DISORDER: Primary | ICD-10-CM

## 2025-07-02 DIAGNOSIS — I25.10 CORONARY ARTERY CALCIFICATION: ICD-10-CM

## 2025-07-02 DIAGNOSIS — E78.5 HYPERLIPIDEMIA, UNSPECIFIED HYPERLIPIDEMIA TYPE: ICD-10-CM

## 2025-07-02 NOTE — TELEPHONE ENCOUNTER
Refill Routing Note   Medication(s) are not appropriate for processing by [facility] for the following reason(s):        Required labs outdated    [facility] action(s):  Defer     Requires labs : Yes             Appointments  past 12m or future 3m with PCP    Date Provider   Last Visit   3/11/2025 Doug Cordero MD   Next Visit   9/11/2025 Doug Cordero MD   ED visits in past 90 days: 0        Note composed:1:26 PM 07/02/2025

## 2025-07-02 NOTE — TELEPHONE ENCOUNTER
Care Due:                  Date            Visit Type   Department     Provider  --------------------------------------------------------------------------------                                EP                               PRIMARY      [facility] INTERNAL  Last Visit: 03-      CARE ([facility])   MEDICINE       Doug Cordero                              [facility]                              PRIMARY      [facility] INTERNAL  Next Visit: 09-      CARE ([facility])   MEDICINE       Doug Cordero                                                            Last  Test          Frequency    Reason                     Performed    Due Date  --------------------------------------------------------------------------------    Lipid Panel.  12 months..  pravastatin..............  03- 02-    [facility] Embedded Care Due Messages. Reference number: [id number].   7/02/2025 10:39:46 AM CDT

## 2025-07-03 RX ORDER — PRAVASTATIN SODIUM 20 MG/1
20 TABLET ORAL
Qty: 90 TABLET | Refills: 0 | Status: SHIPPED | OUTPATIENT
Start: 2025-07-03

## 2025-07-28 DIAGNOSIS — Z00.00 ENCOUNTER FOR MEDICARE ANNUAL WELLNESS EXAM: ICD-10-CM

## 2025-08-28 ENCOUNTER — PATIENT MESSAGE (OUTPATIENT)
Dept: ADMINISTRATIVE | Facility: OTHER | Age: 70
End: 2025-08-28
Payer: MEDICARE

## (undated) DEVICE — BAG DRAIN URIN ANT REFL 2000ML

## (undated) DEVICE — UNDERGLOVES BIOGEL PI SZ 7 LF

## (undated) DEVICE — DEVICE SNAPSECURE FOL CATH

## (undated) DEVICE — SYR 50ML CATH TIP

## (undated) DEVICE — SYR 10CC LUER LOCK

## (undated) DEVICE — PACK CYSTOSCOPY II ECLIPSE

## (undated) DEVICE — GUIDEWIRE PTFE .038INX145CM

## (undated) DEVICE — UNDERGLOVES BIOGEL PI SIZE 7.5

## (undated) DEVICE — SALINE INTRAVENOUS 1L VITEK2

## (undated) DEVICE — STRIP MEDI WND CLSR 1X5IN

## (undated) DEVICE — TRAY CYSTO BASIN

## (undated) DEVICE — PLUG CATHETER STERILE FOLEY

## (undated) DEVICE — CATH COUNCIL TIP 18FR

## (undated) DEVICE — SOL IRRI STRL WATER 1000ML

## (undated) DEVICE — Device

## (undated) DEVICE — APPLICATOR CHLORAPREP ORN 26ML